# Patient Record
Sex: MALE | Race: WHITE | NOT HISPANIC OR LATINO | Employment: OTHER | URBAN - METROPOLITAN AREA
[De-identification: names, ages, dates, MRNs, and addresses within clinical notes are randomized per-mention and may not be internally consistent; named-entity substitution may affect disease eponyms.]

---

## 2020-06-20 ENCOUNTER — HOSPITAL ENCOUNTER (INPATIENT)
Facility: MEDICAL CENTER | Age: 65
LOS: 3 days | DRG: 445 | End: 2020-06-23
Attending: EMERGENCY MEDICINE | Admitting: INTERNAL MEDICINE
Payer: MEDICARE

## 2020-06-20 ENCOUNTER — APPOINTMENT (OUTPATIENT)
Dept: RADIOLOGY | Facility: MEDICAL CENTER | Age: 65
DRG: 445 | End: 2020-06-20
Attending: INTERNAL MEDICINE
Payer: MEDICARE

## 2020-06-20 ENCOUNTER — APPOINTMENT (OUTPATIENT)
Dept: RADIOLOGY | Facility: MEDICAL CENTER | Age: 65
DRG: 445 | End: 2020-06-20
Attending: EMERGENCY MEDICINE
Payer: MEDICARE

## 2020-06-20 PROBLEM — K86.89 PANCREATIC MASS: Status: ACTIVE | Noted: 2020-06-20

## 2020-06-20 PROBLEM — N13.8 ENLARGED PROSTATE WITH URINARY OBSTRUCTION: Status: ACTIVE | Noted: 2020-06-20

## 2020-06-20 PROBLEM — K83.1 OBSTRUCTIVE JAUNDICE DUE TO MALIGNANT NEOPLASM (HCC): Status: ACTIVE | Noted: 2020-06-20

## 2020-06-20 PROBLEM — C80.1 OBSTRUCTIVE JAUNDICE DUE TO MALIGNANT NEOPLASM (HCC): Status: ACTIVE | Noted: 2020-06-20

## 2020-06-20 PROBLEM — D64.9 NORMOCYTIC ANEMIA, NOT DUE TO BLOOD LOSS: Status: ACTIVE | Noted: 2020-06-20

## 2020-06-20 PROBLEM — N40.1 ENLARGED PROSTATE WITH URINARY OBSTRUCTION: Status: ACTIVE | Noted: 2020-06-20

## 2020-06-20 PROBLEM — I10 ESSENTIAL HYPERTENSION: Status: ACTIVE | Noted: 2020-06-20

## 2020-06-20 LAB
ALBUMIN SERPL BCP-MCNC: 3.4 G/DL (ref 3.2–4.9)
ALBUMIN/GLOB SERPL: 1.8 G/DL
ALP SERPL-CCNC: 334 U/L (ref 30–99)
ALT SERPL-CCNC: 90 U/L (ref 2–50)
AMMONIA PLAS-SCNC: 15 UMOL/L (ref 11–45)
ANION GAP SERPL CALC-SCNC: 10 MMOL/L (ref 7–16)
ANISOCYTOSIS BLD QL SMEAR: ABNORMAL
AST SERPL-CCNC: 78 U/L (ref 12–45)
BASOPHILS # BLD AUTO: 1 % (ref 0–1.8)
BASOPHILS # BLD: 0.05 K/UL (ref 0–0.12)
BILIRUB SERPL-MCNC: 23.9 MG/DL (ref 0.1–1.5)
BUN SERPL-MCNC: 16 MG/DL (ref 8–22)
CALCIUM SERPL-MCNC: 9 MG/DL (ref 8.4–10.2)
CHLORIDE SERPL-SCNC: 103 MMOL/L (ref 96–112)
CO2 SERPL-SCNC: 23 MMOL/L (ref 20–33)
COVID ORDER STATUS COVID19: NORMAL
CREAT SERPL-MCNC: 0.35 MG/DL (ref 0.5–1.4)
EOSINOPHIL # BLD AUTO: 0.26 K/UL (ref 0–0.51)
EOSINOPHIL NFR BLD: 5 % (ref 0–6.9)
ERYTHROCYTE [DISTWIDTH] IN BLOOD BY AUTOMATED COUNT: 64 FL (ref 35.9–50)
ETHANOL BLD-MCNC: <10.1 MG/DL (ref 0–10.1)
GLOBULIN SER CALC-MCNC: 1.9 G/DL (ref 1.9–3.5)
GLUCOSE SERPL-MCNC: 140 MG/DL (ref 65–99)
HCT VFR BLD AUTO: 28 % (ref 42–52)
HGB BLD-MCNC: 9.5 G/DL (ref 14–18)
INR PPP: 1.13 (ref 0.87–1.13)
LG PLATELETS BLD QL SMEAR: NORMAL
LIPASE SERPL-CCNC: 27 U/L (ref 7–58)
LYMPHOCYTES # BLD AUTO: 1.28 K/UL (ref 1–4.8)
LYMPHOCYTES NFR BLD: 25 % (ref 22–41)
MAGNESIUM SERPL-MCNC: 1.4 MG/DL (ref 1.5–2.5)
MANUAL DIFF BLD: NORMAL
MCH RBC QN AUTO: 31.5 PG (ref 27–33)
MCHC RBC AUTO-ENTMCNC: 33.8 G/DL (ref 33.7–35.3)
MCV RBC AUTO: 93.3 FL (ref 81.4–97.8)
MONOCYTES # BLD AUTO: 0.15 K/UL (ref 0–0.85)
MONOCYTES NFR BLD AUTO: 3 % (ref 0–13.4)
NEUTROPHILS # BLD AUTO: 3.37 K/UL (ref 1.82–7.42)
NEUTROPHILS NFR BLD: 66 % (ref 44–72)
NRBC # BLD AUTO: 0 K/UL
NRBC BLD-RTO: 0 /100 WBC
PHOSPHATE SERPL-MCNC: 2.3 MG/DL (ref 2.5–4.5)
PLATELET # BLD AUTO: 214 K/UL (ref 164–446)
PLATELET BLD QL SMEAR: NORMAL
PMV BLD AUTO: 11.6 FL (ref 9–12.9)
POIKILOCYTOSIS BLD QL SMEAR: NORMAL
POLYCHROMASIA BLD QL SMEAR: NORMAL
POTASSIUM SERPL-SCNC: 3.5 MMOL/L (ref 3.6–5.5)
PROT SERPL-MCNC: 5.3 G/DL (ref 6–8.2)
PROTHROMBIN TIME: 14.7 SEC (ref 12–14.6)
RBC # BLD AUTO: 2.98 M/UL (ref 4.7–6.1)
RBC BLD AUTO: PRESENT
SARS-COV-2 RNA RESP QL NAA+PROBE: NOTDETECTED
SODIUM SERPL-SCNC: 136 MMOL/L (ref 135–145)
SPECIMEN SOURCE: NORMAL
SPHEROCYTES BLD QL SMEAR: NORMAL
TARGETS BLD QL SMEAR: NORMAL
WBC # BLD AUTO: 5.1 K/UL (ref 4.8–10.8)

## 2020-06-20 PROCEDURE — 76705 ECHO EXAM OF ABDOMEN: CPT

## 2020-06-20 PROCEDURE — 83735 ASSAY OF MAGNESIUM: CPT

## 2020-06-20 PROCEDURE — 85610 PROTHROMBIN TIME: CPT

## 2020-06-20 PROCEDURE — 85027 COMPLETE CBC AUTOMATED: CPT

## 2020-06-20 PROCEDURE — 84100 ASSAY OF PHOSPHORUS: CPT

## 2020-06-20 PROCEDURE — 99285 EMERGENCY DEPT VISIT HI MDM: CPT

## 2020-06-20 PROCEDURE — 85007 BL SMEAR W/DIFF WBC COUNT: CPT

## 2020-06-20 PROCEDURE — 99223 1ST HOSP IP/OBS HIGH 75: CPT | Mod: AI | Performed by: INTERNAL MEDICINE

## 2020-06-20 PROCEDURE — U0004 COV-19 TEST NON-CDC HGH THRU: HCPCS

## 2020-06-20 PROCEDURE — 36415 COLL VENOUS BLD VENIPUNCTURE: CPT

## 2020-06-20 PROCEDURE — 82140 ASSAY OF AMMONIA: CPT

## 2020-06-20 PROCEDURE — 770006 HCHG ROOM/CARE - MED/SURG/GYN SEMI*

## 2020-06-20 PROCEDURE — 80053 COMPREHEN METABOLIC PANEL: CPT

## 2020-06-20 PROCEDURE — 80307 DRUG TEST PRSMV CHEM ANLYZR: CPT

## 2020-06-20 PROCEDURE — C9803 HOPD COVID-19 SPEC COLLECT: HCPCS | Performed by: INTERNAL MEDICINE

## 2020-06-20 PROCEDURE — 700105 HCHG RX REV CODE 258: Performed by: INTERNAL MEDICINE

## 2020-06-20 PROCEDURE — 83690 ASSAY OF LIPASE: CPT

## 2020-06-20 RX ORDER — POLYETHYLENE GLYCOL 3350 17 G/17G
1 POWDER, FOR SOLUTION ORAL
Status: DISCONTINUED | OUTPATIENT
Start: 2020-06-20 | End: 2020-06-23 | Stop reason: HOSPADM

## 2020-06-20 RX ORDER — ACETAMINOPHEN 325 MG/1
650 TABLET ORAL EVERY 6 HOURS PRN
Status: DISCONTINUED | OUTPATIENT
Start: 2020-06-20 | End: 2020-06-23 | Stop reason: HOSPADM

## 2020-06-20 RX ORDER — LISINOPRIL 10 MG/1
10 TABLET ORAL DAILY
COMMUNITY

## 2020-06-20 RX ORDER — AMOXICILLIN 250 MG
2 CAPSULE ORAL 2 TIMES DAILY
Status: DISCONTINUED | OUTPATIENT
Start: 2020-06-20 | End: 2020-06-23 | Stop reason: HOSPADM

## 2020-06-20 RX ORDER — ENALAPRILAT 1.25 MG/ML
1.25 INJECTION INTRAVENOUS EVERY 6 HOURS PRN
Status: DISCONTINUED | OUTPATIENT
Start: 2020-06-20 | End: 2020-06-23 | Stop reason: HOSPADM

## 2020-06-20 RX ORDER — TAMSULOSIN HYDROCHLORIDE 0.4 MG/1
0.4 CAPSULE ORAL
COMMUNITY

## 2020-06-20 RX ORDER — PANTOPRAZOLE SODIUM 40 MG/1
40 TABLET, DELAYED RELEASE ORAL DAILY
COMMUNITY

## 2020-06-20 RX ORDER — SODIUM CHLORIDE 9 MG/ML
INJECTION, SOLUTION INTRAVENOUS CONTINUOUS
Status: DISCONTINUED | OUTPATIENT
Start: 2020-06-20 | End: 2020-06-23

## 2020-06-20 RX ORDER — TAMSULOSIN HYDROCHLORIDE 0.4 MG/1
0.4 CAPSULE ORAL
Status: DISCONTINUED | OUTPATIENT
Start: 2020-06-21 | End: 2020-06-23 | Stop reason: HOSPADM

## 2020-06-20 RX ORDER — ONDANSETRON 2 MG/ML
4 INJECTION INTRAMUSCULAR; INTRAVENOUS EVERY 4 HOURS PRN
Status: DISCONTINUED | OUTPATIENT
Start: 2020-06-20 | End: 2020-06-23 | Stop reason: HOSPADM

## 2020-06-20 RX ORDER — ONDANSETRON 4 MG/1
4 TABLET, ORALLY DISINTEGRATING ORAL EVERY 4 HOURS PRN
Status: DISCONTINUED | OUTPATIENT
Start: 2020-06-20 | End: 2020-06-23 | Stop reason: HOSPADM

## 2020-06-20 RX ORDER — LISINOPRIL 5 MG/1
10 TABLET ORAL DAILY
Status: DISCONTINUED | OUTPATIENT
Start: 2020-06-21 | End: 2020-06-23 | Stop reason: HOSPADM

## 2020-06-20 RX ORDER — BISACODYL 10 MG
10 SUPPOSITORY, RECTAL RECTAL
Status: DISCONTINUED | OUTPATIENT
Start: 2020-06-20 | End: 2020-06-23 | Stop reason: HOSPADM

## 2020-06-20 RX ADMIN — SODIUM CHLORIDE: 9 INJECTION, SOLUTION INTRAVENOUS at 15:57

## 2020-06-20 ASSESSMENT — ENCOUNTER SYMPTOMS
SENSORY CHANGE: 0
COUGH: 0
DIZZINESS: 0
HEADACHES: 0
FEVER: 0
NERVOUS/ANXIOUS: 0
PALPITATIONS: 0
STRIDOR: 0
TREMORS: 0
ABDOMINAL PAIN: 0
CONSTIPATION: 0
SHORTNESS OF BREATH: 0
DEPRESSION: 0
BRUISES/BLEEDS EASILY: 0
HEARTBURN: 0
DIAPHORESIS: 0
CHILLS: 0
WEAKNESS: 0
VOMITING: 0
ORTHOPNEA: 0
MYALGIAS: 0
NAUSEA: 0
SORE THROAT: 0
INSOMNIA: 0
DIARRHEA: 0
SPUTUM PRODUCTION: 0
FALLS: 0

## 2020-06-20 ASSESSMENT — LIFESTYLE VARIABLES
CONSUMPTION TOTAL: NEGATIVE
EVER_SMOKED: YES
TOTAL SCORE: 0
ON A TYPICAL DAY WHEN YOU DRINK ALCOHOL HOW MANY DRINKS DO YOU HAVE: 1
TOTAL SCORE: 0
EVER FELT BAD OR GUILTY ABOUT YOUR DRINKING: NO
HAVE YOU EVER FELT YOU SHOULD CUT DOWN ON YOUR DRINKING: NO
TOTAL SCORE: 0
AVERAGE NUMBER OF DAYS PER WEEK YOU HAVE A DRINK CONTAINING ALCOHOL: 1
ALCOHOL_USE: YES
EVER HAD A DRINK FIRST THING IN THE MORNING TO STEADY YOUR NERVES TO GET RID OF A HANGOVER: NO
HAVE PEOPLE ANNOYED YOU BY CRITICIZING YOUR DRINKING: NO
HOW MANY TIMES IN THE PAST YEAR HAVE YOU HAD 5 OR MORE DRINKS IN A DAY: 0

## 2020-06-20 ASSESSMENT — PATIENT HEALTH QUESTIONNAIRE - PHQ9
2. FEELING DOWN, DEPRESSED, IRRITABLE, OR HOPELESS: NOT AT ALL
1. LITTLE INTEREST OR PLEASURE IN DOING THINGS: NOT AT ALL
SUM OF ALL RESPONSES TO PHQ9 QUESTIONS 1 AND 2: 0

## 2020-06-20 NOTE — ED PROVIDER NOTES
ED Provider Note    CHIEF COMPLAINT  Chief Complaint   Patient presents with   • Jaundice     for past 2-3 weeks. pt states he has been lethargic. pt very yellow. urine is very dark almost brown. denies nausea/vomiting       HPI  Janes Cody is a 64 y.o. male who presents to the emergency department complaint of turning yellow.  He states that he has been turning yellow for the past 3 weeks does not know why.  He does occasionally drink alcohol but states he does not excessively drink alcohol, he has had no abdominal pain, he has felt slightly tired and weak but has not been confused, denies nausea, vomiting, hematochezia, melena.  He states his urine is a very funny Pepsi colored fluid.  Follow-up with his primary care physician several months before he started turning yellow and all his tests were negative.  His family history of carcinoma cancer, denies recent trauma, denies excessive Tylenol use, denies new medications.  REVIEW OF SYSTEMS  Positives as above. Pertinent negatives include fever, shakes, chills, sweats, dental pain, bloating, distention of the abdomen, [place], melena, hematemesis  All other review of systems are negative    PAST MEDICAL HISTORY  Past Medical History:   Diagnosis Date   • Hypertension    • Seizure disorder (HCC)     had a couple seizures once but not on meds and never dx with anything       FAMILY HISTORY  Noncontributory    SOCIAL HISTORY  Social History     Socioeconomic History   • Marital status: Not on file     Spouse name: Not on file   • Number of children: Not on file   • Years of education: Not on file   • Highest education level: Not on file   Occupational History   • Not on file   Social Needs   • Financial resource strain: Not on file   • Food insecurity     Worry: Not on file     Inability: Not on file   • Transportation needs     Medical: Not on file     Non-medical: Not on file   Tobacco Use   • Smoking status: Current Every Day Smoker     Packs/day: 0.25  "    Types: Cigarettes   • Smokeless tobacco: Never Used   Substance and Sexual Activity   • Alcohol use: Yes     Comment: occassional   • Drug use: Yes     Comment: marijuana   • Sexual activity: Not on file   Lifestyle   • Physical activity     Days per week: Not on file     Minutes per session: Not on file   • Stress: Not on file   Relationships   • Social connections     Talks on phone: Not on file     Gets together: Not on file     Attends Zoroastrian service: Not on file     Active member of club or organization: Not on file     Attends meetings of clubs or organizations: Not on file     Relationship status: Not on file   • Intimate partner violence     Fear of current or ex partner: Not on file     Emotionally abused: Not on file     Physically abused: Not on file     Forced sexual activity: Not on file   Other Topics Concern   • Not on file   Social History Narrative   • Not on file       SURGICAL HISTORY  History reviewed. No pertinent surgical history.    CURRENT MEDICATIONS  Home Medications     Reviewed by Arnaldo Aguiar (Pharmacy Tech) on 06/20/20 at 1135  Med List Status: Complete   Medication Last Dose Status   lisinopril (PRINIVIL) 10 MG Tab 6/20/2020 Active   pantoprazole (PROTONIX) 40 MG Tablet Delayed Response 6/20/2020 Active   tamsulosin (FLOMAX) 0.4 MG capsule 6/19/2020 Active                ALLERGIES  Allergies   Allergen Reactions   • Lactose        PHYSICAL EXAM  VITAL SIGNS: /77   Pulse 64   Temp 36.7 °C (98.1 °F) (Temporal)   Resp 14   Ht 1.88 m (6' 2\")   Wt 70.1 kg (154 lb 8.7 oz)   SpO2 100%   BMI 19.84 kg/m²      Constitutional: Well developed, Well nourished, No acute distress, Non-toxic appearance.   Eyes: PERRLA, EOMI, lateral icterus, No discharge.   Cardiovascular: Normal heart rate, Normal rhythm, No murmurs, No rubs, No gallops, and intact distal pulses.   Thorax & Lungs:  No respiratory distress, no rales, no rhonchi, No wheezing, No chest wall tenderness. "   Abdomen: Bowel sounds normal, Soft, No tenderness, No guarding, No rebound, No pulsatile masses.   Skin: Warm, Dry, No erythema, jaundiced throughout  Extremities: Full range of motion, no deformity, no edema.  Neurologic: Alert & oriented x 3, No focal deficits noted, acting appropriately on exam, no asterixis.  Psychiatric: Affect normal for clinical presentation.    Results for orders placed or performed during the hospital encounter of 06/20/20   COMP METABOLIC PANEL   Result Value Ref Range    Sodium 136 135 - 145 mmol/L    Potassium 3.5 (L) 3.6 - 5.5 mmol/L    Chloride 103 96 - 112 mmol/L    Co2 23 20 - 33 mmol/L    Anion Gap 10.0 7.0 - 16.0    Glucose 140 (H) 65 - 99 mg/dL    Bun 16 8 - 22 mg/dL    Creatinine 0.35 (L) 0.50 - 1.40 mg/dL    Calcium 9.0 8.4 - 10.2 mg/dL    AST(SGOT) 78 (H) 12 - 45 U/L    ALT(SGPT) 90 (H) 2 - 50 U/L    Alkaline Phosphatase 334 (H) 30 - 99 U/L    Total Bilirubin 23.9 (H) 0.1 - 1.5 mg/dL    Albumin 3.4 3.2 - 4.9 g/dL    Total Protein 5.3 (L) 6.0 - 8.2 g/dL    Globulin 1.9 1.9 - 3.5 g/dL    A-G Ratio 1.8 g/dL   LIPASE   Result Value Ref Range    Lipase 27 7 - 58 U/L   MAGNESIUM   Result Value Ref Range    Magnesium 1.4 (L) 1.5 - 2.5 mg/dL   PHOSPHORUS   Result Value Ref Range    Phosphorus 2.3 (L) 2.5 - 4.5 mg/dL   AMMONIA   Result Value Ref Range    Ammonia 15 11 - 45 umol/L   PT/INR   Result Value Ref Range    PT 14.7 (H) 12.0 - 14.6 sec    INR 1.13 0.87 - 1.13   ETHYL ALCOHOL (BLOOD)   Result Value Ref Range    Diagnostic Alcohol <10.1 0.0 - 10.1 mg/dL   ESTIMATED GFR   Result Value Ref Range    GFR If African American >60 >60 mL/min/1.73 m 2    GFR If Non African American >60 >60 mL/min/1.73 m 2       RADIOLOGY/PROCEDURES    US-RUQ   Final Result      1.  2.3 cm pancreatic head mass highly suspicious for malignancy      2.  Intrahepatic biliary dilation, extrahepatic there are dilation, and gallbladder distention filled with sludge consistent with biliary obstruction likely  from presumed pancreatic malignancy      3.  Hepatomegaly          COURSE & MEDICAL DECISION MAKING  Pertinent Labs & Imaging studies reviewed. (See chart for details)  This is a pleasant 64-year-old gentleman presents with jaundice with a total bilirubin of 23, ammonia 15, lipase is 27.  Ultrasound was completed revealed evidence of pancreatic mass as well as hepatic ductal dilatation and sludge in the gallbladder suspicious of obstruction of the biliary ductal system.  The patient was discussed with Dr. Moser for hospitalization and GI was consulted, Dr. Wade for consultation.  Do believe the patient will need MRCP followed by GI intervention.  The patient does not have an acute surgical emergency currently he had be hospitalized for further evaluation and termination of the mass as well as biliary obstruction.    FINAL IMPRESSION  Pancreatic mass  Hyperbilirubinemia  Biliary ductal obstruction         Electronically signed by: Mehdi Sheridan D.O., 6/20/2020 11:59 AM

## 2020-06-20 NOTE — ED NOTES
Med Rec completed per patient and RX bottles (returned)   Allergies reviewed  No ORAL antibiotics in last 14 days

## 2020-06-20 NOTE — ASSESSMENT & PLAN NOTE
Likely malignancy, with biliary obstruction and elevated bilirubin level  GI is following [facility], will need follow up with oncology and DR. Flores after pathology is back

## 2020-06-20 NOTE — ASSESSMENT & PLAN NOTE
ERCP done with stent placed, follow lab trend  Will need evaluation with surgery Dr. Flores in the near future when pathology is back from biopsy  Elevated ca19-9

## 2020-06-20 NOTE — H&P
Hospital Medicine History & Physical Note    Date of Service  6/20/2020    Primary Care Physician  [place] [place] [facility]    Code Status  Full Code    Chief Complaint  Chief Complaint   Patient presents with   • Jaundice     for past 2-3 weeks. pt states he has been lethargic. pt very yellow. urine is very dark almost brown. denies nausea/vomiting       History of Presenting Illness  64 y.o. male who presented 6/20/2020 with yellowing skin for 3 weeks. He did see a physician at the [facility] where he lives and has a an appointment with a GI doctor in [place] for June 27. His yellowing was getting worse so he came to the hospital here for evaluation as he felt waiting until June 27 was too long. He has light loose stool, dark urine and yellowing if his skin and eyes getting worse. He denies any nausea, vomiting, abdominal pain, headache, swelling, shortness of breath, cough, or vision changes.     Review of Systems  Review of Systems   Constitutional: Negative for chills, diaphoresis, fever and malaise/fatigue.   HENT: Negative for congestion, nosebleeds and sore throat.    Respiratory: Negative for cough, sputum production, shortness of breath and stridor.    Cardiovascular: Negative for chest pain, palpitations, orthopnea and leg swelling.   Gastrointestinal: Negative for abdominal pain, constipation, diarrhea, heartburn, nausea and vomiting.        Light colored stool   Genitourinary: Negative for dysuria, frequency and urgency.        Dark urine   Musculoskeletal: Negative for falls, joint pain and myalgias.   Skin: Positive for itching. Negative for rash.   Neurological: Negative for dizziness, tremors, sensory change, weakness and headaches.   Endo/Heme/Allergies: Does not bruise/bleed easily.   Psychiatric/Behavioral: Negative for depression. The patient is not nervous/anxious and does not have insomnia.    All other systems reviewed and are negative.      Past Medical History    has a past medical history of Hypertension and Seizure disorder (HCC). enlarged prostate, one seizure in 2018 with no recurrence    Surgical History  Right elbow surgery, appendectomy    Family History  No cancer    Social History   reports that he has been smoking cigarettes. He has been smoking about 0.25 packs per day. He has never used smokeless tobacco. He reports current alcohol use. He reports current drug use.    Allergies  Allergies   Allergen Reactions   • Lactose        Medications  Prior to Admission Medications   Prescriptions Last Dose Informant Patient Reported? Taking?   lisinopril (PRINIVIL) 10 MG Tab 6/20/2020 at AM Rx Bottle (For Med Information) Yes Yes   Sig: Take 10 mg by mouth every day. Indications: High Blood Pressure Disorder   pantoprazole (PROTONIX) 40 MG Tablet Delayed Response 6/20/2020 at AM Rx Bottle (For Med Information) Yes Yes   Sig: Take 40 mg by mouth every day.   tamsulosin (FLOMAX) 0.4 MG capsule 6/19/2020 at AFTERNOON Rx Bottle (For Med Information) Yes Yes   Sig: Take 0.4 mg by mouth ONE-HALF HOUR AFTER BREAKFAST.      Facility-Administered Medications: None       Physical Exam  Temp:  [36.7 °C (98.1 °F)] 36.7 °C (98.1 °F)  Pulse:  [64-71] 64  Resp:  [14] 14  BP: (134-152)/(77-82) 152/77  SpO2:  [100 %] 100 %    Physical Exam  Vitals signs and nursing note reviewed.   Constitutional:       Appearance: He is normal weight. He is not ill-appearing or diaphoretic.   HENT:      Nose: Nose normal. No congestion.      Mouth/Throat:      Mouth: Mucous membranes are moist.      Pharynx: Oropharynx is clear. No oropharyngeal exudate.   Eyes:      General: Scleral icterus present.      Conjunctiva/sclera: Conjunctivae normal.      Pupils: Pupils are equal, round, and reactive to light.   Neck:      Musculoskeletal: Neck supple. No muscular tenderness.   Cardiovascular:      Rate and Rhythm: Normal rate and regular rhythm.      Pulses: Normal pulses.      Heart sounds: Normal heart  sounds. No murmur. No friction rub.   Pulmonary:      Effort: Pulmonary effort is normal. No respiratory distress.      Breath sounds: Normal breath sounds. No stridor.   Abdominal:      General: Bowel sounds are normal. There is no distension.      Palpations: There is no mass.      Tenderness: There is no abdominal tenderness. There is no guarding or rebound.   Musculoskeletal:         General: No swelling or tenderness.   Skin:     General: Skin is warm and dry.      Capillary Refill: Capillary refill takes less than 2 seconds.      Coloration: Skin is jaundiced. Skin is not pale.      Findings: No bruising, erythema or rash.   Neurological:      Mental Status: He is alert and oriented to person, place, and time. Mental status is at baseline.      Coordination: Coordination normal.   Psychiatric:         Mood and Affect: Mood normal.         Behavior: Behavior normal.         Thought Content: Thought content normal.         Laboratory:  Recent Labs     06/20/20  1123   WBC 5.1   RBC 2.98*   HEMOGLOBIN 9.5*   HEMATOCRIT 28.0*   MCV 93.3   MCH 31.5   MCHC 33.8   RDW 64.0*   PLATELETCT 214   MPV 11.6     Recent Labs     06/20/20  1123   SODIUM 136   POTASSIUM 3.5*   CHLORIDE 103   CO2 23   GLUCOSE 140*   BUN 16   CREATININE 0.35*   CALCIUM 9.0     Recent Labs     06/20/20  1123   ALTSGPT 90*   ASTSGOT 78*   ALKPHOSPHAT 334*   TBILIRUBIN 23.9*   LIPASE 27   GLUCOSE 140*     Recent Labs     06/20/20  1123   INR 1.13     No results for input(s): NTPROBNP in the last 72 hours.      No results for input(s): TROPONINT in the last 72 hours.    Imaging:  US-RUQ   Final Result      1.  2.3 cm pancreatic head mass highly suspicious for malignancy      2.  Intrahepatic biliary dilation, extrahepatic there are dilation, and gallbladder distention filled with sludge consistent with biliary obstruction likely from presumed pancreatic malignancy      3.  Hepatomegaly            Assessment/Plan:  I anticipate the patient will  require greater than 2 midnight stay for treatment and work up of his painless jaundice with pancreatic head mass.    Enlarged prostate with urinary obstruction  Assessment & Plan  Continue home flomax    Essential hypertension  Assessment & Plan  Continue lisinopril as tolerated by blood pressure    Obstructive jaundice due to malignant neoplasm (HCC)  Assessment & Plan  Will need biliary stent placement and evaluation with surgery Dr. Flores in the near future    Pancreatic mass  Assessment & Plan  Concerning for malignancy, with biliary obstruction and elevated bilirubin level  GI is consulted [facility], by the ED for possible ERCP and consideration for stent placement and biopsies    Normocytic anemia, not due to blood loss  Assessment & Plan  Monitor labs, likely from chronic inflammation, will check iron studies

## 2020-06-21 ENCOUNTER — APPOINTMENT (OUTPATIENT)
Dept: RADIOLOGY | Facility: MEDICAL CENTER | Age: 65
DRG: 445 | End: 2020-06-21
Attending: INTERNAL MEDICINE
Payer: MEDICARE

## 2020-06-21 LAB
ALBUMIN SERPL BCP-MCNC: 3.1 G/DL (ref 3.2–4.9)
ALBUMIN/GLOB SERPL: 1.8 G/DL
ALP SERPL-CCNC: 329 U/L (ref 30–99)
ALT SERPL-CCNC: 83 U/L (ref 2–50)
ANION GAP SERPL CALC-SCNC: 12 MMOL/L (ref 7–16)
AST SERPL-CCNC: 73 U/L (ref 12–45)
BASOPHILS # BLD AUTO: 0.8 % (ref 0–1.8)
BASOPHILS # BLD: 0.04 K/UL (ref 0–0.12)
BILIRUB SERPL-MCNC: 21.3 MG/DL (ref 0.1–1.5)
BUN SERPL-MCNC: 12 MG/DL (ref 8–22)
CALCIUM SERPL-MCNC: 8.7 MG/DL (ref 8.4–10.2)
CANCER AG19-9 SERPL-ACNC: 417 U/ML (ref 0–35)
CEA SERPL-MCNC: 7.1 NG/ML (ref 0–3)
CHLORIDE SERPL-SCNC: 108 MMOL/L (ref 96–112)
CO2 SERPL-SCNC: 20 MMOL/L (ref 20–33)
CREAT SERPL-MCNC: 0.41 MG/DL (ref 0.5–1.4)
EOSINOPHIL # BLD AUTO: 0.24 K/UL (ref 0–0.51)
EOSINOPHIL NFR BLD: 4.8 % (ref 0–6.9)
ERYTHROCYTE [DISTWIDTH] IN BLOOD BY AUTOMATED COUNT: 63.5 FL (ref 35.9–50)
GLOBULIN SER CALC-MCNC: 1.7 G/DL (ref 1.9–3.5)
GLUCOSE SERPL-MCNC: 105 MG/DL (ref 65–99)
HCT VFR BLD AUTO: 27.2 % (ref 42–52)
HGB BLD-MCNC: 9.4 G/DL (ref 14–18)
IMM GRANULOCYTES # BLD AUTO: 0.01 K/UL (ref 0–0.11)
IMM GRANULOCYTES NFR BLD AUTO: 0.2 % (ref 0–0.9)
IRON SATN MFR SERPL: 74 % (ref 15–55)
IRON SERPL-MCNC: 132 UG/DL (ref 50–180)
LYMPHOCYTES # BLD AUTO: 1.25 K/UL (ref 1–4.8)
LYMPHOCYTES NFR BLD: 25.1 % (ref 22–41)
MCH RBC QN AUTO: 31.4 PG (ref 27–33)
MCHC RBC AUTO-ENTMCNC: 34.6 G/DL (ref 33.7–35.3)
MCV RBC AUTO: 91 FL (ref 81.4–97.8)
MONOCYTES # BLD AUTO: 0.5 K/UL (ref 0–0.85)
MONOCYTES NFR BLD AUTO: 10 % (ref 0–13.4)
NEUTROPHILS # BLD AUTO: 2.95 K/UL (ref 1.82–7.42)
NEUTROPHILS NFR BLD: 59.1 % (ref 44–72)
NRBC # BLD AUTO: 0 K/UL
NRBC BLD-RTO: 0 /100 WBC
PLATELET # BLD AUTO: 207 K/UL (ref 164–446)
PMV BLD AUTO: 12 FL (ref 9–12.9)
POTASSIUM SERPL-SCNC: 3.6 MMOL/L (ref 3.6–5.5)
PROT SERPL-MCNC: 4.8 G/DL (ref 6–8.2)
RBC # BLD AUTO: 2.99 M/UL (ref 4.7–6.1)
SODIUM SERPL-SCNC: 140 MMOL/L (ref 135–145)
TIBC SERPL-MCNC: 178 UG/DL (ref 250–450)
UIBC SERPL-MCNC: 46 UG/DL (ref 110–370)
WBC # BLD AUTO: 5 K/UL (ref 4.8–10.8)

## 2020-06-21 PROCEDURE — 74178 CT ABD&PLV WO CNTR FLWD CNTR: CPT

## 2020-06-21 PROCEDURE — 36415 COLL VENOUS BLD VENIPUNCTURE: CPT

## 2020-06-21 PROCEDURE — 83540 ASSAY OF IRON: CPT

## 2020-06-21 PROCEDURE — 700105 HCHG RX REV CODE 258: Performed by: INTERNAL MEDICINE

## 2020-06-21 PROCEDURE — 700117 HCHG RX CONTRAST REV CODE 255: Performed by: HOSPITALIST

## 2020-06-21 PROCEDURE — 86301 IMMUNOASSAY TUMOR CA 19-9: CPT

## 2020-06-21 PROCEDURE — 80053 COMPREHEN METABOLIC PANEL: CPT

## 2020-06-21 PROCEDURE — 700102 HCHG RX REV CODE 250 W/ 637 OVERRIDE(OP): Performed by: INTERNAL MEDICINE

## 2020-06-21 PROCEDURE — 770006 HCHG ROOM/CARE - MED/SURG/GYN SEMI*

## 2020-06-21 PROCEDURE — A9270 NON-COVERED ITEM OR SERVICE: HCPCS | Performed by: INTERNAL MEDICINE

## 2020-06-21 PROCEDURE — 82378 CARCINOEMBRYONIC ANTIGEN: CPT

## 2020-06-21 PROCEDURE — 99233 SBSQ HOSP IP/OBS HIGH 50: CPT | Performed by: HOSPITALIST

## 2020-06-21 PROCEDURE — 85025 COMPLETE CBC W/AUTO DIFF WBC: CPT

## 2020-06-21 PROCEDURE — 83550 IRON BINDING TEST: CPT

## 2020-06-21 RX ADMIN — IOHEXOL 100 ML: 350 INJECTION, SOLUTION INTRAVENOUS at 08:56

## 2020-06-21 RX ADMIN — TAMSULOSIN HYDROCHLORIDE 0.4 MG: 0.4 CAPSULE ORAL at 10:11

## 2020-06-21 RX ADMIN — SODIUM CHLORIDE: 9 INJECTION, SOLUTION INTRAVENOUS at 05:17

## 2020-06-21 ASSESSMENT — COGNITIVE AND FUNCTIONAL STATUS - GENERAL
SUGGESTED CMS G CODE MODIFIER DAILY ACTIVITY: CH
MOBILITY SCORE: 24
DAILY ACTIVITIY SCORE: 24
SUGGESTED CMS G CODE MODIFIER MOBILITY: CH

## 2020-06-21 ASSESSMENT — ENCOUNTER SYMPTOMS
DEPRESSION: 0
BACK PAIN: 0
EYE PAIN: 0
FEVER: 0
INSOMNIA: 0
SHORTNESS OF BREATH: 0
COUGH: 0
PALPITATIONS: 0
NECK PAIN: 0
HEADACHES: 0
CHILLS: 0
NAUSEA: 0
BLURRED VISION: 0
SORE THROAT: 0
ABDOMINAL PAIN: 0
VOMITING: 0
DIZZINESS: 0
WEIGHT LOSS: 1
TINGLING: 0

## 2020-06-21 NOTE — PROGRESS NOTES
Received report from [place] at 0705 and assumed care. Pt. Is resting in bed. Bed in lowest and locked position. Pt's belongings are within reach. Pt's call light is within reach.

## 2020-06-21 NOTE — PROGRESS NOTES
Received patient from ED. AOx4, reported no pain. Reports having generalized itchiness. Verbalizes understanding of admission orders and POC. Jaundiced. Planned ERCP for tomorrow.

## 2020-06-21 NOTE — CONSULTS
GI Consult    Pancreatic mass    Obstructive jaundice    Rec: Pancreatic protocol CT           ERCP       Full consult to follow

## 2020-06-21 NOTE — PROGRESS NOTES
[facility] Medicine Daily Progress Note    Date of Service  6/21/2020    Chief Complaint  64 y.o. male admitted 6/20/2020 with jaundice.     Hospital Course    He was found to have a pancreatic mass. Gi was consulted and he was provided with supportive care.       Interval Problem Update  I discussed him with radiology today regarding his CT. He is frustrated about not eating and not being on the ERCP schedule. I had the RN contact GI for an update on when his ERCP is planned. He has no pain.     Consultants/Specialty  GI    Code Status  full    Disposition  tbd    Review of Systems  Review of Systems   Constitutional: Positive for malaise/fatigue and weight loss. Negative for chills and fever.   HENT: Negative for sore throat.    Eyes: Negative for blurred vision and pain.   Respiratory: Negative for cough and shortness of breath.    Cardiovascular: Negative for chest pain and palpitations.   Gastrointestinal: Negative for abdominal pain, nausea and vomiting.   Genitourinary: Negative for dysuria and urgency.   Musculoskeletal: Negative for back pain and neck pain.   Skin: Negative for itching and rash.   Neurological: Negative for dizziness, tingling and headaches.   Psychiatric/Behavioral: Negative for depression. The patient does not have insomnia.    All other systems reviewed and are negative.       Physical Exam  Temp:  [36.6 °C (97.9 °F)-36.8 °C (98.2 °F)] 36.6 °C (97.9 °F)  Pulse:  [58-71] 60  Resp:  [14-20] 18  BP: (121-152)/(63-82) 121/63  SpO2:  [97 %-100 %] 98 %    Physical Exam  Vitals signs and nursing note reviewed.   Constitutional:       General: He is not in acute distress.     Appearance: He is well-developed. He is not diaphoretic.      Comments: Patient seen and examined at the bedside. Plan discussed at bedside with the RN.   Thin appearing.    HENT:      Right Ear: External ear normal.      Left Ear: External ear normal.      Nose: Nose normal.   Eyes:      General: Scleral icterus present.          Right eye: No discharge.         Left eye: No discharge.   Neck:      Vascular: No JVD.      Trachea: No tracheal deviation.   Cardiovascular:      Rate and Rhythm: Normal rate.      Heart sounds: Normal heart sounds. No murmur.   Pulmonary:      Effort: Pulmonary effort is normal. No respiratory distress.      Breath sounds: Normal breath sounds. No wheezing or rales.   Abdominal:      General: Bowel sounds are normal. There is no distension.      Palpations: Abdomen is soft.      Tenderness: There is no abdominal tenderness. There is no guarding.   Musculoskeletal:         General: No tenderness.   Skin:     General: Skin is warm and dry.      Coloration: Skin is jaundiced.      Findings: No erythema.   Neurological:      Mental Status: He is alert and oriented to person, place, and time.   Psychiatric:         Behavior: Behavior normal.         Fluids    Intake/Output Summary (Last 24 hours) at 6/21/2020 0801  Last data filed at 6/21/2020 0517  Gross per 24 hour   Intake 878.75 ml   Output 950 ml   Net -71.25 ml       Laboratory  Recent Labs     06/20/20  1123 06/21/20  0341   WBC 5.1 5.0   RBC 2.98* 2.99*   HEMOGLOBIN 9.5* 9.4*   HEMATOCRIT 28.0* 27.2*   MCV 93.3 91.0   MCH 31.5 31.4   MCHC 33.8 34.6   RDW 64.0* 63.5*   PLATELETCT 214 207   MPV 11.6 12.0     Recent Labs     06/20/20  1123 06/21/20  0341   SODIUM 136 140   POTASSIUM 3.5* 3.6   CHLORIDE 103 108   CO2 23 20   GLUCOSE 140* 105*   BUN 16 12   CREATININE 0.35* 0.41*   CALCIUM 9.0 8.7     Recent Labs     06/20/20  1123   INR 1.13               Imaging  US-RUQ   Final Result      1.  2.3 cm pancreatic head mass highly suspicious for malignancy      2.  Intrahepatic biliary dilation, extrahepatic there are dilation, and gallbladder distention filled with sludge consistent with biliary obstruction likely from presumed pancreatic malignancy      3.  Hepatomegaly      CT-ABDOMEN-PELVIS WITH & W/O    (Results Pending)        Assessment/Plan  *  Obstructive jaundice due to malignant neoplasm (HCC)  Assessment & Plan  Will need biliary stent placement and evaluation with surgery Dr. Flores in the near future. ERCP pending with biopsy with GI. Pending ca19-9    Enlarged prostate with urinary obstruction  Assessment & Plan  Continue home flomax    Essential hypertension  Assessment & Plan  Continue lisinopril as tolerated by blood pressure    Pancreatic mass  Assessment & Plan  Concerning for malignancy, with biliary obstruction and elevated bilirubin level  GI is consulted [facility], by the ED for possible ERCP and consideration for stent placement and biopsies    Normocytic anemia, not due to blood loss  Assessment & Plan  Monitor labs, likely from chronic inflammation, pending iron studies       VTE prophylaxis: scd

## 2020-06-21 NOTE — CONSULTS
DATE OF SERVICE:  06/21/2020    GASTROENTEROLOGY CONSULTATION    REQUESTING PHYSICIAN:  Gardenia Moser MD    REASON FOR CONSULT:  Jaundice.    HISTORY OF PRESENT ILLNESS:  The patient is a 64-year-old male presents today   with a complaint of jaundice.    The patient describes an approximately 3- to 4-week history of progressive   jaundice.  He initially noticed some yellowing of his eyes, which has become   worse over time.  He was seen by primary care physician in [place], [place] for this issue and referred to a gastroenterologist.  His   appointment was not until next month.  He decided to come to [place] to seek   further evaluation and care given his daughter lives here.    The patient denies any history of jaundice, liver disease or pancreatic   disease in the past.  He has, otherwise, been feeling fairly well.  He does   admit to unintentional weight loss over the same period of time up to 10   pounds.    His appetite is normal.  He denies any nausea, vomiting or abdominal pain.    He does admit to dark-colored urine and light-colored stools.    REVIEW OF SYSTEMS:  PSYCHIATRIC:  Denies anxiety, depression.  MUSCULOSKELETAL:  No joint pain, swelling, stiffness.  DERMATOLOGIC:  Positive for pruritus.  GASTROINTESTINAL:  As above.  CARDIOVASCULAR:  No chest pain, palpitation, shortness of breath.  PULMONARY:  No cough or wheezing.  ALLERGY AND IMMUNOLOGY:  No hay fever.  ENDOCRINE:  Denies polyuria, polydipsia.  CONSTITUTIONAL:  Positive for unintentional weight loss.  GENITOURINARY:  Positive for urinary hesitancy.    PAST MEDICAL HISTORY:  Hypertension, BPH, history of seizure.    PAST SURGICAL HISTORY:  Appendectomy, elbow surgery.    OUTPATIENT MEDICATIONS:  Lisinopril, Protonix, Flomax.    ALLERGIES:  No known drug allergies.    SOCIAL HISTORY:  He has a significant other.  He has 2 grown children.  He   does smoke cigarettes.  He occasionally drinks alcohol, but not to excess.    Denies illicit  drug use.    FAMILY HISTORY:  No family history of gastrointestinal malignancies.    PHYSICAL EXAMINATION:  VITAL SIGNS:  Temperature is 36.7, pulse 64, respirations 18, /75, O2   sat 97% on room air.  GENERAL APPEARANCE:  Pleasant, thin male in no acute distress.  He is alert   and oriented x3.  HEENT:  Sclerae are icteric.  Oropharynx is moist.  NECK:  Supple, without adenopathy.  HEART:  Regular rate and rhythm.  LUNGS:  Clear to auscultation.  ABDOMEN:  Flat, soft with positive bowel sounds.  It is nontender.  There is   no guarding, rebound or rigidity.  EXTREMITIES:  Warm and dry without clubbing, cyanosis or edema.  SKIN:  Shows no rashes.    LABORATORY DATA:  White count 5, hematocrit 27, platelet count 207,000.    Sodium 140, potassium 3.6, chloride 108, bicarbonate 20, BUN 12, creatinine   0.4, AST 73, ALT 83, alkaline phosphatase 329, bilirubin 21, albumin 3.1.    Iron sat is 74%.  INR is 1.1.  CEA is 7.1.  CA 19-9 417.  COVID swab is   negative.    IMAGING:  CT scan of the abdomen and pelvis shows a 2.5 cm pancreatic head   mass, dilated biliary tract, left renal cysts, dilated portal vein.    ASSESSMENT AND PLAN:  A 64-year-old male with obstructive jaundice and   evidence of pancreatic mass concerning for malignancy.  1.  Pancreatic mass.  Radiographic findings and elevated tumor markers highly   suggestive of primary pancreatic neoplasm.  Plan, endoscopic ultrasound with   fine-needle aspiration of pancreatic mass tomorrow.  Informed consent was   obtained after explanation of risks, benefits and alternatives.  Next,   surgical consultation if he does not show any vascular involvement of his   mass.  2.  Obstructive jaundice secondary to problem #1.  Plan, endoscopic retrograde   cholangiopancreatography with biliary stent will be performed at the time of   the endoscopic ultrasound tomorrow.  3.  Malnutrition.  4.  Hypertension.  5.  Anemia, normocytic, likely secondary to chronic disease.  No  evidence of   gastrointestinal bleeding.  6.  Benign prostatic hypertrophy.       ____________________________________     DO CHIP HSU / MARÍA    DD:  06/21/2020 15:47:31  DT:  06/21/2020 16:41:25    D#:  [id number]  Job#:  [id number]

## 2020-06-21 NOTE — DISCHARGE PLANNING
Patient financial assisted updated insurance:       Admit Status: Inpatient  Admit Date:  06/20/20  Insurance Coverage: Medicare A&B / [employer] FFS  If Medicaid coverage, DOD? unknown  Authorization #: NNR  Number of Days Authorized: BOWEN   IP/OBS Notification by phone, fax or online?  NA  If by phone, reference # for call or representatives name: NA  Is insurance listed on Contracted Providers list?: No-secondary payor   Patient's estimated financial responsibility? 0

## 2020-06-21 NOTE — PROGRESS NOTES
Bedside report received from day RN. Pt is AAO x 4..  Pt reports no pain.POC discussed. Pt to be NPO after midnight. All needs met at this time. Bed in low position.Call light within reach.Rounding in place.

## 2020-06-21 NOTE — DISCHARGE PLANNING
Patient is a 64 year old single man who lives in [place], [place]. Facesheet report unemployed, no insurance.     SW emailed Patient Financial Assistance for Medicaid screening.     No PCP listed, no recent encounters, however patient reports he has gone to PCP. No issues with ADLs or IADLs. No home O2 noted, no hospital O2. Pharmacy is listed as [facility] on [address]. Patient reports regular alcohol use, denies issues with drugs and/or alcohol. No MH issues noted.     Patient is here for jaundice, diagnosed with pancreatic mass, GI consulted. Patient is not medically clear.     PLAN:   Screen patient for Medicaid: Patient financial assistance is on it.   Ensure that patient has any prescriptions needed due to no insurance  Ensure that patient has follow up with providers.     Care Transition Team Assessment    Information Source  Information Given By: Patient  Who is responsible for making decisions for patient? : Patient    Readmission Evaluation  Is this a readmission?: No    Elopement Risk  Legal Hold: No  Ambulatory or Self Mobile in Wheelchair: No-Not an Elopement Risk  Elopement Risk: Not at Risk for Elopement    Interdisciplinary Discharge Planning  Patient or legal guardian wants to designate a caregiver (see row info): No    Discharge Preparedness  What is your plan after discharge?: Home with help  What are your discharge supports?: Child  Prior Functional Level: Ambulatory, Independent with Activities of Daily Living, Independent with Medication Management  Difficulity with ADLs: None  Difficulity with IADLs: None    Functional Assesment  Prior Functional Level: Ambulatory, Independent with Activities of Daily Living, Independent with Medication Management    Finances  Financial Barriers to Discharge: Yes  Prescription Coverage: No  Prescription Coverage Comments: No insurance    Vision / Hearing Impairment  Vision Impairment : Yes  Right Eye Vision: Wears Glasses  Left Eye Vision: Wears Glasses  Hearing  Impairment : No         Advance Directive  Advance Directive?: None    Domestic Abuse  Have you ever been the victim of abuse or violence?: No  Physical Abuse or Sexual Abuse: No  Verbal Abuse or Emotional Abuse: No  Possible Abuse Reported to:: Not Applicable    Psychological Assessment  History of Substance Abuse: Other (comment)  Date Last Used - Alcohol: alcohol use  History of Psychiatric Problems: No  Non-compliant with Treatment: No  Newly Diagnosed Illness: Yes    Discharge Risks or Barriers  Discharge risks or barriers?: Uninsured / underinsured  Patient risk factors: Uninsured or underinsured    Anticipated Discharge Information  Anticipated discharge disposition: Home  Discharge Address: [address]  Discharge Contact Phone Number: [phone]

## 2020-06-21 NOTE — CARE PLAN
Problem: Safety  Goal: Will remain free from falls  Outcome: PROGRESSING AS EXPECTED  Note: Pt is compliant with using call light and asking for help when needed. Pt wears treaded slipper socks. Pt's bed is in lowest and locked position. Pt's belongings are within reach. Pt's call light is within reach. Pt ambulates appropriately.     Problem: Venous Thromboembolism (VTW)/Deep Vein Thrombosis (DVT) Prevention:  Goal: Patient will participate in Venous Thrombosis (VTE)/Deep Vein Thrombosis (DVT)Prevention Measures  Outcome: PROGRESSING AS EXPECTED  Flowsheets (Taken 6/21/2020 1000)  Mechanical Prophylaxis: SCDs, Sequential Compression Device  Note: Pt's SCD's are on. Pt is compliant with wearing SCD's.

## 2020-06-22 ENCOUNTER — ANESTHESIA EVENT (OUTPATIENT)
Dept: SURGERY | Facility: MEDICAL CENTER | Age: 65
DRG: 445 | End: 2020-06-22
Payer: MEDICARE

## 2020-06-22 ENCOUNTER — ANESTHESIA (OUTPATIENT)
Dept: SURGERY | Facility: MEDICAL CENTER | Age: 65
DRG: 445 | End: 2020-06-22
Payer: MEDICARE

## 2020-06-22 ENCOUNTER — APPOINTMENT (OUTPATIENT)
Dept: RADIOLOGY | Facility: MEDICAL CENTER | Age: 65
DRG: 445 | End: 2020-06-22
Attending: PODIATRIST
Payer: MEDICARE

## 2020-06-22 LAB
ALBUMIN SERPL BCP-MCNC: 3 G/DL (ref 3.2–4.9)
ALBUMIN/GLOB SERPL: 1.6 G/DL
ALP SERPL-CCNC: 306 U/L (ref 30–99)
ALT SERPL-CCNC: 80 U/L (ref 2–50)
ANION GAP SERPL CALC-SCNC: 10 MMOL/L (ref 7–16)
APTT PPP: 31.7 SEC (ref 24.7–36)
AST SERPL-CCNC: 69 U/L (ref 12–45)
BASOPHILS # BLD AUTO: 1 % (ref 0–1.8)
BASOPHILS # BLD: 0.05 K/UL (ref 0–0.12)
BILIRUB SERPL-MCNC: 20 MG/DL (ref 0.1–1.5)
BUN SERPL-MCNC: 13 MG/DL (ref 8–22)
CALCIUM SERPL-MCNC: 8.6 MG/DL (ref 8.4–10.2)
CHLORIDE SERPL-SCNC: 105 MMOL/L (ref 96–112)
CO2 SERPL-SCNC: 22 MMOL/L (ref 20–33)
CREAT SERPL-MCNC: 0.41 MG/DL (ref 0.5–1.4)
EOSINOPHIL # BLD AUTO: 0.29 K/UL (ref 0–0.51)
EOSINOPHIL NFR BLD: 5.7 % (ref 0–6.9)
ERYTHROCYTE [DISTWIDTH] IN BLOOD BY AUTOMATED COUNT: 64.4 FL (ref 35.9–50)
GLOBULIN SER CALC-MCNC: 1.9 G/DL (ref 1.9–3.5)
GLUCOSE SERPL-MCNC: 105 MG/DL (ref 65–99)
HCT VFR BLD AUTO: 26.6 % (ref 42–52)
HGB BLD-MCNC: 9.2 G/DL (ref 14–18)
IMM GRANULOCYTES # BLD AUTO: 0.02 K/UL (ref 0–0.11)
IMM GRANULOCYTES NFR BLD AUTO: 0.4 % (ref 0–0.9)
INR PPP: 1.19 (ref 0.87–1.13)
LYMPHOCYTES # BLD AUTO: 1.19 K/UL (ref 1–4.8)
LYMPHOCYTES NFR BLD: 23.4 % (ref 22–41)
MCH RBC QN AUTO: 31.5 PG (ref 27–33)
MCHC RBC AUTO-ENTMCNC: 34.6 G/DL (ref 33.7–35.3)
MCV RBC AUTO: 91.1 FL (ref 81.4–97.8)
MONOCYTES # BLD AUTO: 0.46 K/UL (ref 0–0.85)
MONOCYTES NFR BLD AUTO: 9.1 % (ref 0–13.4)
NEUTROPHILS # BLD AUTO: 3.07 K/UL (ref 1.82–7.42)
NEUTROPHILS NFR BLD: 60.4 % (ref 44–72)
NRBC # BLD AUTO: 0 K/UL
NRBC BLD-RTO: 0 /100 WBC
PLATELET # BLD AUTO: 213 K/UL (ref 164–446)
PMV BLD AUTO: 12.4 FL (ref 9–12.9)
POTASSIUM SERPL-SCNC: 3.8 MMOL/L (ref 3.6–5.5)
PROT SERPL-MCNC: 4.9 G/DL (ref 6–8.2)
PROTHROMBIN TIME: 15.3 SEC (ref 12–14.6)
RBC # BLD AUTO: 2.92 M/UL (ref 4.7–6.1)
SODIUM SERPL-SCNC: 137 MMOL/L (ref 135–145)
WBC # BLD AUTO: 5.1 K/UL (ref 4.8–10.8)

## 2020-06-22 PROCEDURE — 500066 HCHG BITE BLOCK, ECT: Performed by: INTERNAL MEDICINE

## 2020-06-22 PROCEDURE — BF111ZZ FLUOROSCOPY OF BILIARY AND PANCREATIC DUCTS USING LOW OSMOLAR CONTRAST: ICD-10-PCS | Performed by: INTERNAL MEDICINE

## 2020-06-22 PROCEDURE — 700111 HCHG RX REV CODE 636 W/ 250 OVERRIDE (IP): Performed by: ANESTHESIOLOGY

## 2020-06-22 PROCEDURE — 160048 HCHG OR STATISTICAL LEVEL 1-5: Performed by: INTERNAL MEDICINE

## 2020-06-22 PROCEDURE — 160009 HCHG ANES TIME/MIN: Performed by: INTERNAL MEDICINE

## 2020-06-22 PROCEDURE — 503093 HCHG BRUSH, CYTOLOGY: Performed by: INTERNAL MEDICINE

## 2020-06-22 PROCEDURE — C2617 STENT, NON-COR, TEM W/O DEL: HCPCS | Performed by: INTERNAL MEDICINE

## 2020-06-22 PROCEDURE — 700102 HCHG RX REV CODE 250 W/ 637 OVERRIDE(OP): Performed by: INTERNAL MEDICINE

## 2020-06-22 PROCEDURE — 85730 THROMBOPLASTIN TIME PARTIAL: CPT

## 2020-06-22 PROCEDURE — 0FDG8ZX EXTRACTION OF PANCREAS, VIA NATURAL OR ARTIFICIAL OPENING ENDOSCOPIC, DIAGNOSTIC: ICD-10-PCS | Performed by: INTERNAL MEDICINE

## 2020-06-22 PROCEDURE — 88307 TISSUE EXAM BY PATHOLOGIST: CPT

## 2020-06-22 PROCEDURE — 0F798DZ DILATION OF COMMON BILE DUCT WITH INTRALUMINAL DEVICE, VIA NATURAL OR ARTIFICIAL OPENING ENDOSCOPIC: ICD-10-PCS | Performed by: INTERNAL MEDICINE

## 2020-06-22 PROCEDURE — 160035 HCHG PACU - 1ST 60 MINS PHASE I: Performed by: INTERNAL MEDICINE

## 2020-06-22 PROCEDURE — 0FDD8ZX EXTRACTION OF PANCREATIC DUCT, VIA NATURAL OR ARTIFICIAL OPENING ENDOSCOPIC, DIAGNOSTIC: ICD-10-PCS | Performed by: INTERNAL MEDICINE

## 2020-06-22 PROCEDURE — 88112 CYTOPATH CELL ENHANCE TECH: CPT

## 2020-06-22 PROCEDURE — A9270 NON-COVERED ITEM OR SERVICE: HCPCS | Performed by: INTERNAL MEDICINE

## 2020-06-22 PROCEDURE — 700105 HCHG RX REV CODE 258: Performed by: INTERNAL MEDICINE

## 2020-06-22 PROCEDURE — 80053 COMPREHEN METABOLIC PANEL: CPT

## 2020-06-22 PROCEDURE — 74328 X-RAY BILE DUCT ENDOSCOPY: CPT

## 2020-06-22 PROCEDURE — 700101 HCHG RX REV CODE 250: Performed by: ANESTHESIOLOGY

## 2020-06-22 PROCEDURE — 0FD98ZX EXTRACTION OF COMMON BILE DUCT, VIA NATURAL OR ARTIFICIAL OPENING ENDOSCOPIC, DIAGNOSTIC: ICD-10-PCS | Performed by: INTERNAL MEDICINE

## 2020-06-22 PROCEDURE — 88173 CYTOPATH EVAL FNA REPORT: CPT

## 2020-06-22 PROCEDURE — 700105 HCHG RX REV CODE 258: Performed by: HOSPITALIST

## 2020-06-22 PROCEDURE — 85610 PROTHROMBIN TIME: CPT

## 2020-06-22 PROCEDURE — 88305 TISSUE EXAM BY PATHOLOGIST: CPT

## 2020-06-22 PROCEDURE — 36415 COLL VENOUS BLD VENIPUNCTURE: CPT

## 2020-06-22 PROCEDURE — 770006 HCHG ROOM/CARE - MED/SURG/GYN SEMI*

## 2020-06-22 PROCEDURE — 85025 COMPLETE CBC W/AUTO DIFF WBC: CPT

## 2020-06-22 PROCEDURE — C1769 GUIDE WIRE: HCPCS | Performed by: INTERNAL MEDICINE

## 2020-06-22 PROCEDURE — 160002 HCHG RECOVERY MINUTES (STAT): Performed by: INTERNAL MEDICINE

## 2020-06-22 PROCEDURE — 502240 HCHG MISC OR SUPPLY RC 0272: Performed by: INTERNAL MEDICINE

## 2020-06-22 PROCEDURE — 160208 HCHG ENDO MINUTES - EA ADDL 1 MIN LEVEL 4: Performed by: INTERNAL MEDICINE

## 2020-06-22 PROCEDURE — 160203 HCHG ENDO MINUTES - 1ST 30 MINS LEVEL 4: Performed by: INTERNAL MEDICINE

## 2020-06-22 PROCEDURE — 99233 SBSQ HOSP IP/OBS HIGH 50: CPT | Performed by: HOSPITALIST

## 2020-06-22 PROCEDURE — 0DJ08ZZ INSPECTION OF UPPER INTESTINAL TRACT, VIA NATURAL OR ARTIFICIAL OPENING ENDOSCOPIC: ICD-10-PCS | Performed by: INTERNAL MEDICINE

## 2020-06-22 DEVICE — STENT BILIARY ADVANTIX 10FR X 7CM: Type: IMPLANTABLE DEVICE | Status: FUNCTIONAL

## 2020-06-22 RX ORDER — HYDROMORPHONE HYDROCHLORIDE 1 MG/ML
0.1 INJECTION, SOLUTION INTRAMUSCULAR; INTRAVENOUS; SUBCUTANEOUS
Status: DISCONTINUED | OUTPATIENT
Start: 2020-06-22 | End: 2020-06-22 | Stop reason: HOSPADM

## 2020-06-22 RX ORDER — LIDOCAINE HYDROCHLORIDE 20 MG/ML
INJECTION, SOLUTION EPIDURAL; INFILTRATION; INTRACAUDAL; PERINEURAL PRN
Status: DISCONTINUED | OUTPATIENT
Start: 2020-06-22 | End: 2020-06-22 | Stop reason: SURG

## 2020-06-22 RX ORDER — OXYCODONE HCL 5 MG/5 ML
5 SOLUTION, ORAL ORAL
Status: DISCONTINUED | OUTPATIENT
Start: 2020-06-22 | End: 2020-06-22 | Stop reason: HOSPADM

## 2020-06-22 RX ORDER — HYDROMORPHONE HYDROCHLORIDE 1 MG/ML
0.4 INJECTION, SOLUTION INTRAMUSCULAR; INTRAVENOUS; SUBCUTANEOUS
Status: DISCONTINUED | OUTPATIENT
Start: 2020-06-22 | End: 2020-06-22 | Stop reason: HOSPADM

## 2020-06-22 RX ORDER — SODIUM CHLORIDE, SODIUM LACTATE, POTASSIUM CHLORIDE, CALCIUM CHLORIDE 600; 310; 30; 20 MG/100ML; MG/100ML; MG/100ML; MG/100ML
1000 INJECTION, SOLUTION INTRAVENOUS CONTINUOUS
Status: CANCELLED | OUTPATIENT
Start: 2020-06-22 | End: 2020-06-23

## 2020-06-22 RX ORDER — SODIUM CHLORIDE, SODIUM LACTATE, POTASSIUM CHLORIDE, CALCIUM CHLORIDE 600; 310; 30; 20 MG/100ML; MG/100ML; MG/100ML; MG/100ML
INJECTION, SOLUTION INTRAVENOUS CONTINUOUS
Status: ACTIVE | OUTPATIENT
Start: 2020-06-22 | End: 2020-06-23

## 2020-06-22 RX ORDER — MEPERIDINE HYDROCHLORIDE 25 MG/ML
12.5 INJECTION INTRAMUSCULAR; INTRAVENOUS; SUBCUTANEOUS
Status: DISCONTINUED | OUTPATIENT
Start: 2020-06-22 | End: 2020-06-22 | Stop reason: HOSPADM

## 2020-06-22 RX ORDER — HYDROMORPHONE HYDROCHLORIDE 1 MG/ML
0.2 INJECTION, SOLUTION INTRAMUSCULAR; INTRAVENOUS; SUBCUTANEOUS
Status: DISCONTINUED | OUTPATIENT
Start: 2020-06-22 | End: 2020-06-22 | Stop reason: HOSPADM

## 2020-06-22 RX ORDER — ROCURONIUM BROMIDE 10 MG/ML
INJECTION, SOLUTION INTRAVENOUS PRN
Status: DISCONTINUED | OUTPATIENT
Start: 2020-06-22 | End: 2020-06-22 | Stop reason: SURG

## 2020-06-22 RX ORDER — ONDANSETRON 2 MG/ML
INJECTION INTRAMUSCULAR; INTRAVENOUS PRN
Status: DISCONTINUED | OUTPATIENT
Start: 2020-06-22 | End: 2020-06-22 | Stop reason: SURG

## 2020-06-22 RX ORDER — ONDANSETRON 2 MG/ML
4 INJECTION INTRAMUSCULAR; INTRAVENOUS
Status: DISCONTINUED | OUTPATIENT
Start: 2020-06-22 | End: 2020-06-22 | Stop reason: HOSPADM

## 2020-06-22 RX ORDER — OXYCODONE HCL 5 MG/5 ML
10 SOLUTION, ORAL ORAL
Status: DISCONTINUED | OUTPATIENT
Start: 2020-06-22 | End: 2020-06-22 | Stop reason: HOSPADM

## 2020-06-22 RX ORDER — MIDAZOLAM HYDROCHLORIDE 1 MG/ML
INJECTION INTRAMUSCULAR; INTRAVENOUS PRN
Status: DISCONTINUED | OUTPATIENT
Start: 2020-06-22 | End: 2020-06-22 | Stop reason: SURG

## 2020-06-22 RX ORDER — HYDRALAZINE HYDROCHLORIDE 20 MG/ML
5 INJECTION INTRAMUSCULAR; INTRAVENOUS
Status: DISCONTINUED | OUTPATIENT
Start: 2020-06-22 | End: 2020-06-22 | Stop reason: HOSPADM

## 2020-06-22 RX ORDER — DEXAMETHASONE SODIUM PHOSPHATE 4 MG/ML
INJECTION, SOLUTION INTRA-ARTICULAR; INTRALESIONAL; INTRAMUSCULAR; INTRAVENOUS; SOFT TISSUE PRN
Status: DISCONTINUED | OUTPATIENT
Start: 2020-06-22 | End: 2020-06-22 | Stop reason: SURG

## 2020-06-22 RX ORDER — DIPHENHYDRAMINE HYDROCHLORIDE 50 MG/ML
12.5 INJECTION INTRAMUSCULAR; INTRAVENOUS
Status: DISCONTINUED | OUTPATIENT
Start: 2020-06-22 | End: 2020-06-22 | Stop reason: HOSPADM

## 2020-06-22 RX ORDER — SODIUM CHLORIDE, SODIUM LACTATE, POTASSIUM CHLORIDE, CALCIUM CHLORIDE 600; 310; 30; 20 MG/100ML; MG/100ML; MG/100ML; MG/100ML
1000 INJECTION, SOLUTION INTRAVENOUS ONCE
Status: COMPLETED | OUTPATIENT
Start: 2020-06-22 | End: 2020-06-23

## 2020-06-22 RX ADMIN — DEXAMETHASONE SODIUM PHOSPHATE 6 MG: 4 INJECTION, SOLUTION INTRAMUSCULAR; INTRAVENOUS at 17:46

## 2020-06-22 RX ADMIN — MIDAZOLAM HYDROCHLORIDE 1 MG: 1 INJECTION, SOLUTION INTRAMUSCULAR; INTRAVENOUS at 16:31

## 2020-06-22 RX ADMIN — PROPOFOL 150 MG: 10 INJECTION, EMULSION INTRAVENOUS at 16:35

## 2020-06-22 RX ADMIN — SODIUM CHLORIDE: 9 INJECTION, SOLUTION INTRAVENOUS at 20:10

## 2020-06-22 RX ADMIN — LIDOCAINE HYDROCHLORIDE 80 MG: 20 INJECTION, SOLUTION EPIDURAL; INFILTRATION; INTRACAUDAL; PERINEURAL at 16:35

## 2020-06-22 RX ADMIN — SODIUM CHLORIDE, POTASSIUM CHLORIDE, SODIUM LACTATE AND CALCIUM CHLORIDE 1000 ML: 600; 310; 30; 20 INJECTION, SOLUTION INTRAVENOUS at 11:43

## 2020-06-22 RX ADMIN — ONDANSETRON 4 MG: 2 INJECTION INTRAMUSCULAR; INTRAVENOUS at 17:46

## 2020-06-22 RX ADMIN — POVIDONE-IODINE 15 ML: 10 SOLUTION TOPICAL at 16:19

## 2020-06-22 RX ADMIN — SUGAMMADEX 140 MG: 100 INJECTION, SOLUTION INTRAVENOUS at 17:46

## 2020-06-22 RX ADMIN — FENTANYL CITRATE 50 MCG: 50 INJECTION, SOLUTION INTRAMUSCULAR; INTRAVENOUS at 16:35

## 2020-06-22 RX ADMIN — SODIUM CHLORIDE, POTASSIUM CHLORIDE, SODIUM LACTATE AND CALCIUM CHLORIDE: 600; 310; 30; 20 INJECTION, SOLUTION INTRAVENOUS at 16:10

## 2020-06-22 RX ADMIN — ROCURONIUM BROMIDE 30 MG: 10 INJECTION, SOLUTION INTRAVENOUS at 16:35

## 2020-06-22 RX ADMIN — SODIUM CHLORIDE: 9 INJECTION, SOLUTION INTRAVENOUS at 08:12

## 2020-06-22 ASSESSMENT — ENCOUNTER SYMPTOMS
BLURRED VISION: 0
ABDOMINAL PAIN: 0
NAUSEA: 0
TINGLING: 0
PALPITATIONS: 0
EYE PAIN: 0
SHORTNESS OF BREATH: 0
COUGH: 0
DIZZINESS: 0
NECK PAIN: 0
SORE THROAT: 0
DEPRESSION: 0
WEIGHT LOSS: 1
FEVER: 0
BACK PAIN: 0
CHILLS: 0
INSOMNIA: 0

## 2020-06-22 ASSESSMENT — PAIN SCALES - GENERAL: PAIN_LEVEL: 0

## 2020-06-22 ASSESSMENT — FIBROSIS 4 INDEX: FIB4 SCORE: 2.35

## 2020-06-22 NOTE — PROGRESS NOTES
[facility] Medicine Daily Progress Note    Date of Service  6/22/2020    Chief Complaint  64 y.o. male admitted 6/20/2020 with jaundice.     Hospital Course    He was found to have a pancreatic mass. Gi was consulted and he was provided with supportive care. Imaging and labs showed evidence confirming a mass and an elevated . He was set up for an ERCP.        Interval Problem Update  6/21- I discussed him with radiology today regarding his CT. He is frustrated about not eating and not being on the ERCP schedule. I had the RN contact GI for an update on when his ERCP is planned. He has no pain.   6/22- ERCP planned for today. . He has no pain I reviewed his CT below. CA 19-9 markedly up.     CT abdomen pancrease:  1.  2.5 cm pancreatic head mass consistent with malignancy  2.  Associated biliary obstruction with marked dilation of the common bile duct, intrahepatic or system, and the gallbladder  3.  Negative for vascular encasement  4.  No enlarged lymph node in the peripancreatic spaces  5.  1.7 cm hyperdense left renal cyst which is Bosniak class II F, follow-up recommended  6.  Portal hypertension with associated dilated portal vein and right colonic wall thickening  7.  Aortic atherosclerotic plaque  8.  Minimal atelectasis      Consultants/Specialty  GI    Code Status  full    Disposition  tbd    Review of Systems  Review of Systems   Constitutional: Positive for malaise/fatigue and weight loss. Negative for chills and fever.   HENT: Negative for sore throat.    Eyes: Negative for blurred vision and pain.   Respiratory: Negative for cough and shortness of breath.    Cardiovascular: Negative for chest pain and palpitations.   Gastrointestinal: Negative for abdominal pain and nausea.   Genitourinary: Negative for dysuria and urgency.   Musculoskeletal: Negative for back pain and neck pain.   Skin: Negative for itching and rash.   Neurological: Negative for dizziness and tingling.   Psychiatric/Behavioral:  Negative for depression. The patient does not have insomnia.    All other systems reviewed and are negative.       Physical Exam  Temp:  [36.7 °C (98.1 °F)-36.9 °C (98.4 °F)] 36.9 °C (98.4 °F)  Pulse:  [58-65] 58  Resp:  [17-18] 18  BP: (126-140)/(56-94) 135/75  SpO2:  [97 %-100 %] 100 %    Physical Exam  Vitals signs and nursing note reviewed.   Constitutional:       General: He is not in acute distress.     Appearance: He is well-developed. He is not diaphoretic.      Comments: Patient seen and examined at the bedside. Plan discussed at bedside with the RN.   Thin appearing.    HENT:      Right Ear: External ear normal.      Left Ear: External ear normal.      Nose: Nose normal.   Eyes:      General: Scleral icterus present.         Right eye: No discharge.         Left eye: No discharge.   Neck:      Vascular: No JVD.      Trachea: No tracheal deviation.   Cardiovascular:      Rate and Rhythm: Normal rate.      Heart sounds: Normal heart sounds. No murmur.   Pulmonary:      Effort: Pulmonary effort is normal. No respiratory distress.      Breath sounds: Normal breath sounds. No wheezing or rales.   Abdominal:      General: Bowel sounds are normal. There is no distension.      Palpations: Abdomen is soft.      Tenderness: There is no abdominal tenderness. There is no guarding.   Musculoskeletal:         General: No tenderness.   Skin:     General: Skin is warm and dry.      Coloration: Skin is jaundiced.      Findings: No erythema.   Neurological:      Mental Status: He is alert and oriented to person, place, and time.   Psychiatric:         Behavior: Behavior normal.         Fluids    Intake/Output Summary (Last 24 hours) at 6/22/2020 0819  Last data filed at 6/22/2020 0751  Gross per 24 hour   Intake 840 ml   Output 2145 ml   Net -1305 ml       Laboratory  Recent Labs     06/20/20  1123 06/21/20  0341 06/22/20  0339   WBC 5.1 5.0 5.1   RBC 2.98* 2.99* 2.92*   HEMOGLOBIN 9.5* 9.4* 9.2*   HEMATOCRIT 28.0* 27.2*  26.6*   MCV 93.3 91.0 91.1   MCH 31.5 31.4 31.5   MCHC 33.8 34.6 34.6   RDW 64.0* 63.5* 64.4*   PLATELETCT 214 207 213   MPV 11.6 12.0 12.4     Recent Labs     06/20/20  1123 06/21/20  0341 06/22/20  0339   SODIUM 136 140 137   POTASSIUM 3.5* 3.6 3.8   CHLORIDE 103 108 105   CO2 23 20 22   GLUCOSE 140* 105* 105*   BUN 16 12 13   CREATININE 0.35* 0.41* 0.41*   CALCIUM 9.0 8.7 8.6     Recent Labs     06/20/20  1123 06/22/20  0339   APTT  --  31.7   INR 1.13 1.19*               Imaging  CT-ABDOMEN-PELVIS WITH & W/O   Final Result      1.  2.5 cm pancreatic head mass consistent with malignancy      2.  Associated biliary obstruction with marked dilation of the common bile duct, intrahepatic or system, and the gallbladder      3.  Negative for vascular encasement      4.  No enlarged lymph node in the peripancreatic spaces      5.  1.7 cm hyperdense left renal cyst which is Bosniak class II F, follow-up recommended      6.  Portal hypertension with associated dilated portal vein and right colonic wall thickening      7.  Aortic atherosclerotic plaque      8.  Minimal atelectasis      US-RUQ   Final Result      1.  2.3 cm pancreatic head mass highly suspicious for malignancy      2.  Intrahepatic biliary dilation, extrahepatic there are dilation, and gallbladder distention filled with sludge consistent with biliary obstruction likely from presumed pancreatic malignancy      3.  Hepatomegaly           Assessment/Plan  * Obstructive jaundice due to malignant neoplasm (HCC)  Assessment & Plan  Will need biliary stent placement and evaluation with surgery Dr. Flores in the near future. ERCP pending for today with biopsy with GI. Elevated ca19-9    Enlarged prostate with urinary obstruction  Assessment & Plan  Continue home flomax    Essential hypertension  Assessment & Plan  Continue lisinopril as tolerated by blood pressure    Pancreatic mass  Assessment & Plan  Likely malignancy, with biliary obstruction and elevated  bilirubin level  GI is following DHA, with planned ERCP and consideration for stent placement and biopsies    Normocytic anemia, not due to blood loss  Assessment & Plan  Monitor labs, likely from chronic disease       VTE prophylaxis: scd

## 2020-06-22 NOTE — RESPIRATORY CARE
COPD EDUCATION by COPD CLINICAL EDUCATOR  6/22/2020 at 3:17 PM by Lucero Molina, RRT     Patient reviewed by COPD education team. Patient does not have a history or diagnosis of COPD. Patient is a smoker, smoking cessation education refused.

## 2020-06-22 NOTE — PROGRESS NOTES
Received patient, resting in bed, alert and oriented, denies pain, in no apparent distress, Discussed POC, safety precautions in place, call light within reach.

## 2020-06-22 NOTE — PROGRESS NOTES
Gave report to night nurse Juan at 1900. Pt is resting comfortably in bed. Pts belongings at bedside. Bed in lowest and locked position.

## 2020-06-22 NOTE — OR NURSING
1620: Pt up to RR, tolerated it well. Call placed to Fransisco Marquis to see when he might be arriving. Pt is anxious that procedure will be cancelled, he was reassured that it would not be.

## 2020-06-22 NOTE — PROGRESS NOTES
Gastroenterology Progress Note     Author: Melvin Marquis M.D.   Date & Time Created: 2020 4:23 PM    Chief Complaint:  jaundice    Interval History:  No complaints    Review of Systems:  Review of Systems   All other systems reviewed and are negative.      Physical Exam:  Physical Exam  Eyes:      General: Scleral icterus present.   Cardiovascular:      Rate and Rhythm: Normal rate and regular rhythm.   Pulmonary:      Effort: Pulmonary effort is normal.      Breath sounds: Normal breath sounds.   Abdominal:      General: Abdomen is flat. Bowel sounds are normal. There is no distension.      Palpations: Abdomen is soft.   Neurological:      Mental Status: He is alert.         Labs:          Recent Labs     20  033   SODIUM 136 140 137   POTASSIUM 3.5* 3.6 3.8   CHLORIDE 103 108 105   CO2  22   BUN 16 12 13   CREATININE 0.35* 0.41* 0.41*   MAGNESIUM 1.4*  --   --    PHOSPHORUS 2.3*  --   --    CALCIUM 9.0 8.7 8.6     Recent Labs     20  033   ALTSGPT 90* 83* 80*   ASTSGOT 78* 73* 69*   ALKPHOSPHAT 334* 329* 306*   TBILIRUBIN 23.9* 21.3* 20.0*   LIPASE 27  --   --    GLUCOSE 140* 105* 105*     Recent Labs     20  033   RBC 2.98* 2.99* 2.92*   HEMOGLOBIN 9.5* 9.4* 9.2*   HEMATOCRIT 28.0* 27.2* 26.6*   PLATELETCT 214 207 213   PROTHROMBTM 14.7*  --  15.3*   APTT  --   --  31.7   INR 1.13  --  1.19*   IRON  --  132  --    TOTIRONBC  --  178*  --      Recent Labs     20  033   WBC 5.1 5.0 5.1   NEUTSPOLYS 66.00 59.10 60.40   LYMPHOCYTES 25.00 25.10 23.40   MONOCYTES 3.00 10.00 9.10   EOSINOPHILS 5.00 4.80 5.70   BASOPHILS 1.00 0.80 1.00   ASTSGOT 78* 73* 69*   ALTSGPT 90* 83* 80*   ALKPHOSPHAT 334* 329* 306*   TBILIRUBIN 23.9* 21.3* 20.0*     Hemodynamics:  Temp (24hrs), Av.8 °C (98.2 °F), Min:36.7 °C (98.1 °F), Max:36.9 °C (98.4 °F)  Temperature: 36.7 °C  (98.1 °F)  Pulse  Av.5  Min: 56  Max: 71   Blood Pressure : (!) 174/84     Respiratory:    Respiration: 16, Pulse Oximetry: 98 %           Fluids:    Intake/Output Summary (Last 24 hours) at 2020 1623  Last data filed at 2020 1322  Gross per 24 hour   Intake --   Output 2395 ml   Net -2395 ml     Weight: 70.1 kg (154 lb 8.7 oz)  GI/Nutrition:  Orders Placed This Encounter   Procedures   • Diet NPO     Standing Status:   Standing     Number of Occurrences:   8     Order Specific Question:   Restrict to:     Answer:   Strict [1]     Medical Decision Making, by Problem:  Active Hospital Problems    Diagnosis   • *Obstructive jaundice due to malignant neoplasm (HCC) [K83.1, C80.1]   • Normocytic anemia, not due to blood loss [D64.9]   • Pancreatic mass [K86.89]   • Essential hypertension [I10]   • Enlarged prostate with urinary obstruction [N40.1, N13.8]       Plan:  Obstructive jaundice - EUS ERCP today    Quality-Core Measures

## 2020-06-22 NOTE — OR NURSING
1523: Pt brought to pre-op holding via WC, tolerated it well, able to stand and walk to the bed without assistance. Report rcvd earlier from Stacey and Chante, RNs. No pain or nausea, awake and alert, on room air.  1557: Patient allergies and NPO status verified, home medication reconciliation completed and belongings secured. Patient verbalizes understanding of pain scale, expected course of stay and plan of care. Surgical site verified with patient. IV patency confirmed.

## 2020-06-22 NOTE — ANESTHESIA PROCEDURE NOTES
Airway    Date/Time: 6/22/2020 4:37 PM  Performed by: Raheem Fletcher M.D.  Authorized by: Raheem Fletcher M.D.     Location:  OR  Urgency:  Elective  Difficult Airway: No    Indications for Airway Management:  Anesthesia      Spontaneous Ventilation: absent    Sedation Level:  Deep  Preoxygenated: Yes    Patient Position:  Sniffing  Mask Difficulty Assessment:  1 - vent by mask  Final Airway Type:  Endotracheal airway  Final Endotracheal Airway:  ETT  Cuffed: Yes    Technique Used for Successful ETT Placement:  Video laryngoscopy    Insertion Site:  Oral  Blade Type:  Humza  Laryngoscope Blade/Videolaryngoscope Blade Size:  4  ETT Size (mm):  7.0  Measured from:  Lips  ETT to Lips (cm):  23  Placement Verified by: auscultation and capnometry    Cormack-Lehane Classification:  Grade I - full view of glottis  Number of Attempts at Approach:  1  Number of Other Approaches Attempted:  0

## 2020-06-22 NOTE — OR NURSING
1756: To PACU from OR via bed, Patient is on 6 lpm of oxygen via mask, he appears to be comfortable at this time.     1810: No change, patient still sleeping with supplemental oxygen in place at 6 lpm via mask.    1815: Patient awakened.     1820: Patient put dentures in place, Daughter updated by telephone, Patient transitioned to room air and is denying pain and nausea.    1825: Meets criteria to transfer to return to the floor, Room 201, bed 1. SHERYL Sharif given report.

## 2020-06-22 NOTE — CARE PLAN
Problem: Communication  Goal: The ability to communicate needs accurately and effectively will improve  Outcome: PROGRESSING AS EXPECTED  Note: Pt is able to effectively communicate needs.     Problem: Safety  Goal: Will remain free from falls  Outcome: PROGRESSING AS EXPECTED  Note: Pt calls for help appropriately. Pt is steady when ambulating. Pt wears treaded socks.

## 2020-06-22 NOTE — ANESTHESIA PREPROCEDURE EVALUATION
63 y/o male w/ obstructive jaundice secondary to a pancreatic head mass highly suspicious for malignancy.    Relevant Problems   ANESTHESIA (within normal limits)      CARDIAC   (+) Essential hypertension      Other   (+) Enlarged prostate with urinary obstruction   (+) Normocytic anemia, not due to blood loss   (+) Obstructive jaundice due to malignant neoplasm (HCC)   (+) Pancreatic mass   Smokes tobacco and marijuana occasionally;    Physical Exam    Airway   Mallampati: I  TM distance: >3 FB  Neck ROM: full       Cardiovascular - normal exam  Rhythm: regular  Rate: normal  (-) murmur     Dental - normal exam  (+) upper dentures, lower dentures           Pulmonary - normal exam  Breath sounds clear to auscultation     Abdominal    Neurological - normal exam               Anesthesia Plan    ASA 2       Plan - general       Airway plan will be ETT      Plan Factors:   Patient was not previously instructed to abstain from smoking on day of procedure.  Patient did not smoke on day of procedure.      Induction: intravenous    Postoperative Plan: Postoperative administration of opioids is intended.    Pertinent diagnostic labs and testing reviewed    Informed Consent:    Anesthetic plan and risks discussed with patient.    Use of blood products discussed with: patient whom consented to blood products.

## 2020-06-23 ENCOUNTER — PATIENT OUTREACH (OUTPATIENT)
Dept: HEALTH INFORMATION MANAGEMENT | Facility: OTHER | Age: 65
End: 2020-06-23

## 2020-06-23 VITALS
BODY MASS INDEX: 19.83 KG/M2 | RESPIRATION RATE: 18 BRPM | DIASTOLIC BLOOD PRESSURE: 65 MMHG | HEIGHT: 74 IN | OXYGEN SATURATION: 100 % | WEIGHT: 154.54 LBS | TEMPERATURE: 98.7 F | HEART RATE: 63 BPM | SYSTOLIC BLOOD PRESSURE: 123 MMHG

## 2020-06-23 LAB — PATHOLOGY CONSULT NOTE: NORMAL

## 2020-06-23 PROCEDURE — 99239 HOSP IP/OBS DSCHRG MGMT >30: CPT | Performed by: INTERNAL MEDICINE

## 2020-06-23 PROCEDURE — A9270 NON-COVERED ITEM OR SERVICE: HCPCS | Performed by: INTERNAL MEDICINE

## 2020-06-23 PROCEDURE — 700102 HCHG RX REV CODE 250 W/ 637 OVERRIDE(OP): Performed by: INTERNAL MEDICINE

## 2020-06-23 RX ADMIN — TAMSULOSIN HYDROCHLORIDE 0.4 MG: 0.4 CAPSULE ORAL at 08:05

## 2020-06-23 RX ADMIN — LISINOPRIL 10 MG: 5 TABLET ORAL at 05:12

## 2020-06-23 ASSESSMENT — ENCOUNTER SYMPTOMS
DIZZINESS: 0
VOMITING: 0
MYALGIAS: 0
NERVOUS/ANXIOUS: 0
CONSTIPATION: 0
ABDOMINAL PAIN: 0
SORE THROAT: 0
HEADACHES: 0
SPUTUM PRODUCTION: 0
BACK PAIN: 0
TINGLING: 0
CHILLS: 0
DIAPHORESIS: 0
NAUSEA: 0
FEVER: 0
PALPITATIONS: 0
BLOOD IN STOOL: 0
DEPRESSION: 0
SHORTNESS OF BREATH: 0
COUGH: 0
SINUS PAIN: 0

## 2020-06-23 NOTE — PROGRESS NOTES
Gave report to night nurse Harley at 1850 Pt is resting comfortably. Pts belongings at bedside. Bed in lowest and locked position. Call light is within reach.

## 2020-06-23 NOTE — PROGRESS NOTES
Went over DC instructions with patients. All questions answered.  setting up patient's 2 follow up appointments. IV DC'ed. Awaiting ride home from daughter.

## 2020-06-23 NOTE — PROGRESS NOTES
Hospital Medicine Daily Progress Note    Date of Service  6/23/2020    Chief Complaint  64 y.o. male admitted 6/20/2020 with yellowing skin    Hospital Course    This is a 64 y.o. male who presented 6/20/2020 with yellowing skin for 3 weeks. He did see a physician at the [facility] where he lives and has a an appointment with a GI doctor in [place] for June 27. His yellowing was getting worse so he came to the hospital here for evaluation as he felt waiting until June 27 was too long. He has light loose stool, dark urine and yellowing of his skin and eyes getting worse.  CA 19-9 is elevated and on 6/22 ERCP was done with biliary stent placed and biopsies taken.       Interval Problem Update  The patient denies pain    Consultants/Specialty  GI Dr. Marquis    Code Status  full    Disposition  home    Review of Systems  Review of Systems   Constitutional: Negative for chills, diaphoresis, fever and malaise/fatigue.   HENT: Negative for congestion, sinus pain and sore throat.    Respiratory: Negative for cough, sputum production and shortness of breath.    Cardiovascular: Negative for chest pain, palpitations and leg swelling.   Gastrointestinal: Negative for abdominal pain, blood in stool, constipation, nausea and vomiting.   Genitourinary: Negative for dysuria, frequency and urgency.   Musculoskeletal: Negative for back pain and myalgias.   Skin: Positive for itching. Negative for rash.   Neurological: Negative for dizziness, tingling and headaches.   Psychiatric/Behavioral: Negative for depression. The patient is not nervous/anxious.         Physical Exam  Temp:  [36.3 °C (97.3 °F)-37.2 °C (99 °F)] 37.1 °C (98.7 °F)  Pulse:  [52-82] 66  Resp:  [16-18] 18  BP: (122-175)/(58-90) 122/69  SpO2:  [97 %-100 %] 97 %    Physical Exam  Vitals signs and nursing note reviewed.   Constitutional:       Appearance: He is ill-appearing. He is not diaphoretic.   HENT:      Nose: No congestion or rhinorrhea.       Mouth/Throat:      Mouth: Mucous membranes are moist.      Pharynx: No oropharyngeal exudate.   Eyes:      General: Scleral icterus present.      Extraocular Movements: Extraocular movements intact.      Pupils: Pupils are equal, round, and reactive to light.   Neck:      Musculoskeletal: Neck supple. No muscular tenderness.   Cardiovascular:      Rate and Rhythm: Normal rate and regular rhythm.      Heart sounds: Normal heart sounds. No friction rub.   Pulmonary:      Breath sounds: Normal breath sounds. No wheezing.   Abdominal:      General: Bowel sounds are normal. There is no distension.      Palpations: There is no mass.   Musculoskeletal:         General: No tenderness.   Skin:     General: Skin is warm.      Capillary Refill: Capillary refill takes less than 2 seconds.      Coloration: Skin is jaundiced. Skin is not pale.   Neurological:      Mental Status: He is alert and oriented to person, place, and time.   Psychiatric:         Mood and Affect: Mood normal.         Behavior: Behavior normal.         Fluids    Intake/Output Summary (Last 24 hours) at 6/23/2020 0801  Last data filed at 6/23/2020 0500  Gross per 24 hour   Intake 2667.83 ml   Output 2605 ml   Net 62.83 ml       Laboratory  Recent Labs     06/20/20  1123 06/21/20  0341 06/22/20  0339   WBC 5.1 5.0 5.1   RBC 2.98* 2.99* 2.92*   HEMOGLOBIN 9.5* 9.4* 9.2*   HEMATOCRIT 28.0* 27.2* 26.6*   MCV 93.3 91.0 91.1   MCH 31.5 31.4 31.5   MCHC 33.8 34.6 34.6   RDW 64.0* 63.5* 64.4*   PLATELETCT 214 207 213   MPV 11.6 12.0 12.4     Recent Labs     06/20/20  1123 06/21/20  0341 06/22/20  0339   SODIUM 136 140 137   POTASSIUM 3.5* 3.6 3.8   CHLORIDE 103 108 105   CO2 23 20 22   GLUCOSE 140* 105* 105*   BUN 16 12 13   CREATININE 0.35* 0.41* 0.41*   CALCIUM 9.0 8.7 8.6     Recent Labs     06/20/20  1123 06/22/20  0339   APTT  --  31.7   INR 1.13 1.19*               Imaging  [id number]   Final Result      Intraoperative image as above described.       CT-ABDOMEN-PELVIS WITH & W/O   Final Result      1.  2.5 cm pancreatic head mass consistent with malignancy      2.  Associated biliary obstruction with marked dilation of the common bile duct, intrahepatic or system, and the gallbladder      3.  Negative for vascular encasement      4.  No enlarged lymph node in the peripancreatic spaces      5.  1.7 cm hyperdense left renal cyst which is Bosniak class II F, follow-up recommended      6.  Portal hypertension with associated dilated portal vein and right colonic wall thickening      7.  Aortic atherosclerotic plaque      8.  Minimal atelectasis      US-RUQ   Final Result      1.  2.3 cm pancreatic head mass highly suspicious for malignancy      2.  Intrahepatic biliary dilation, extrahepatic there are dilation, and gallbladder distention filled with sludge consistent with biliary obstruction likely from presumed pancreatic malignancy      3.  Hepatomegaly      DX-PORTABLE FLUORO > 1 HOUR    (Results Pending)        Assessment/Plan  * Obstructive jaundice due to malignant neoplasm (HCC)  Assessment & Plan  ERCP done with stent placed, follow lab trend  Will need evaluation with surgery Dr. Flores in the near future when pathology is back from biopsy  Elevated ca19-9    Enlarged prostate with urinary obstruction  Assessment & Plan  Continue home flomax    Essential hypertension  Assessment & Plan  Continue lisinopril     Pancreatic mass  Assessment & Plan  Likely malignancy, with biliary obstruction and elevated bilirubin level  GI is following DHA, will need follow up with oncology and DR. Flores after pathology is back    Normocytic anemia, not due to blood loss  Assessment & Plan  Stable, due to chronic disease         VTE prophylaxis: scd's

## 2020-06-23 NOTE — ANESTHESIA POSTPROCEDURE EVALUATION
Patient: Janes Cody    Procedure Summary     Date:  06/22/20 Room / Location:   ENDOSCOPIC ULTRASOUND ROOM / SURGERY [facility]    Anesthesia Start:  1631 Anesthesia Stop:  1759    Procedures:       ERCP, DIAGNOSTIC      ESOPHAGOSCOPY, WITH ENDOSCOPIC US Diagnosis:       Mass of pancreas      (Mass of pancreas [[id number]])    Surgeon:  Melvin Marquis M.D. Responsible Provider:  Raheem Fletcher M.D.    Anesthesia Type:  general ASA Status:  2          Final Anesthesia Type: general  Last vitals  BP   Blood Pressure : (Abnormal) 175/77    Temp   37.2 °C (99 °F)    Pulse   Pulse: (Abnormal) 52   Resp   16    SpO2   100 %      Anesthesia Post Evaluation    Patient location during evaluation: PACU  Patient participation: complete - patient participated  Level of consciousness: awake and alert  Pain score: 0    Airway patency: patent  Anesthetic complications: no  Cardiovascular status: hemodynamically stable  Respiratory status: acceptable  Hydration status: euvolemic    PONV: none           Nurse Pain Score: 0 (NPRS)

## 2020-06-23 NOTE — OP REPORT
DATE OF SERVICE:  06/22/2020     PROCEDURE PERFORMED:  Endoscopic ultrasound with fine needle aspiration, Endoscopic retrograde cholangiography with sphincterotomy, bile duct brushing, and stent placement     CONSENT:  Procedure risks and benefits reviewed thoroughly with the patient, risks including but not limited to bleeding, perforation, side effects of medication were informed.  Patient voiced understanding and agreed to proceed.  Additional risks inherent to ERCP that being mild, moderate, severe pancreatitis that could lead to postprocedural pain, prolonged hospitalization, intensive care unit stay, and/or death were reviewed with the patient who voiced understanding and agreed to proceed.     PREPROCEDURE DIAGNOSIS:  pancreatic mass, obstructive jaundice     POSTPROCEDURE DIAGNOSES:  pancreatic mass, obstructive jaundice     PROCEDURE DESCRIPTION:  The patient was placed in the prone position after sedation and intubation by the anesthesiologist. Vital signs were monitored continuously throughout the procedure.  When ready, the upper endoscope was placed in the patient's mouth and advanced easily and carefully to the esophagus and subsequently to the stomach and duodenum. The scope was then slowly withdrawn and mucosa examined. Retroflexion was performed in the stomach. The EGD scope was removed.    The EUS scope was then selected. The scope was placed in the mouth and advanced with semi-direct visualization through the oropharynx to the esophagus, stomach and duodenum. Ultrasound images are noted below. Following the EUS, the ERCP was performed as outlined below.    EGD: normal esophagus, stomach and duodenum    EUS: The linear scope was selected. The gastric wall appeared normal. The liver appeared normal. There was extensive intrahepatic ductal dilation. The aorta, celiac artery and SMA appeared normal. The left kidney, left adrenal and spleen appeared normal. The pancreas appeared homogeneous. The PD  measured 5.4mm in the body and was dilated. The scope was advanced to the duodenal bulb. The duodenal wall appeared normal. The CBD measured 16mm and was massiverly dilated. There was abrupt cutoff of the bile duct at the level of the pancreatic head. At this site, there was a large hypoechoic mass measuring 31 x 18mm in diameter. This appeared malignant. The mass appeared to abut the portal venous confluence with concern for invasion of the PV due to loss of interface in this area. After inspection, FNA was performed using a 22g [employer] fine needle biopsy needle. A total of 4 passes were made. Note that there was not a pathologist present for on-site interpretation. The scope was advanced to D2 and the uncinate appeared normal. The scope was withdrawn to the esophagus. The esophageal wall was normal.    ERCP:  After completion of the EUS/EGD, a side-viewing duodenoscope was passed carefully and easily under semi-direct visualization into the esophagus and passed through the stomach to the duodenum. Upon reaching the second portion of the duodenum, the scope was withdrawn to the short-position.The ampulla was identified. The ampulla appeared normal.    Using a [employer] Rx44 sphincterotome preloaded with a 0.035 wire the CBD was cannulated. The wire was advanced under fluoroscopy to the level of the intrahepatics and the sphincterotome was advanced over the wire. Bile was aspirated and then contrast was injected to obtain a cholangiogram. The cholangiogram was entirely interpreted by myself during the exam. The cholangiogram revealed a dilated proximal bile duct to 18mm. There was a malignant appearing stricture approximately 2cm in length in the mid-CBD. The distal CBD measuring 5mm in diameter and appeared normal. The sphincterotome was then withdrawn to the papilla and an adequate sphincterotomy was performed. The sphincterotome was then withdrawn and exchanged for a biliary brush. The stricture in the  mid-CBD was brushed multiple times. Finally, the bile duct was stented to relieve the obstruction. A 10F x 7cm plastic stent was placed in the CBD with free spontaneous flow of bile at completion of the procedure. All instruments were removed and the procedure was completed.         COMPLICATIONS:  None.     BLOOD LOSS:  None.     SPECIMENS:  None.    SUMMARY:  1.) EUS revealing dilated intra and extrahepatic bile ducts  2.) Pancreatic head mass approximately 3cm in diameter with loss of interface at the portal vein. Findings would be concerning for unresectable malignancy. FNA performed  3.) ERCP with CBD cannulation  4.) Cholangiogram revealing a 2cm long mid-CBD stricture  5.) ERCP with sphincterotomy followed by bile duct brushing  6.) successful biliary stent placement     RECOMMENDATIONS:   1.) f/u biopsies and brushing  2.) restart diet  3.) consult Dr Flores and Oncology when pathology has returned  4.) Repeat ERCP for stent change in 8 weeks

## 2020-06-23 NOTE — DISCHARGE INSTRUCTIONS
Discharge Instructions per Gardenia Moser M.D.    Follow up with GI in 6-8 weeks for stent replacement  Follow up with Dr. Flores as soon as possible for biopsy results and treatment options    DIET: regular    ACTIVITY: as tolerated    DIAGNOSIS: pancreatic mass    Return to ER if symptoms worsen      Discharge Instructions    Discharged to home by car with relative. Discharged via wheelchair, hospital escort: Yes.  Special equipment needed: Not Applicable    Be sure to schedule a follow-up appointment with your primary care doctor or any specialists as instructed.     Discharge Plan:   Diet Plan: Discussed  Activity Level: Discussed  Smoking Cessation Offered: Patient Refused  Confirmed Follow up Appointment: Patient to Call and Schedule Appointment  Confirmed Symptoms Management: Discussed  Medication Reconciliation Updated: Yes    I understand that a diet low in cholesterol, fat, and sodium is recommended for good health. Unless I have been given specific instructions below for another diet, I accept this instruction as my diet prescription.   Other diet: Regular    Special Instructions: None    · Is patient discharged on Warfarin / Coumadin?   No     Depression / Suicide Risk    As you are discharged from this [facility] facility, it is important to learn how to keep safe from harming yourself.    Recognize the warning signs:  · Abrupt changes in personality, positive or negative- including increase in energy   · Giving away possessions  · Change in eating patterns- significant weight changes-  positive or negative  · Change in sleeping patterns- unable to sleep or sleeping all the time   · Unwillingness or inability to communicate  · Depression  · Unusual sadness, discouragement and loneliness  · Talk of wanting to die  · Neglect of personal appearance   · Rebelliousness- reckless behavior  · Withdrawal from people/activities they love  · Confusion- inability to concentrate     If you or a loved  one observes any of these behaviors or has concerns about self-harm, here's what you can do:  · Talk about it- your feelings and reasons for harming yourself  · Remove any means that you might use to hurt yourself (examples: pills, rope, extension cords, firearm)  · Get professional help from the community (Mental Health, Substance Abuse, psychological counseling)  · Do not be alone:Call your Safe Contact- someone whom you trust who will be there for you.  · Call your local CRISIS HOTLINE 659-1215 or 073-276-4984  · Call your local Children's Mobile Crisis Response Team [place] (961) 266-7827 or www.CleanTie  · Call the toll free National Suicide Prevention Hotlines   · National Suicide Prevention Lifeline 089-173-BNXK (2742)  · National Hope Line Network 800-SUICIDE (021-2127)

## 2020-06-23 NOTE — PROGRESS NOTES
"Report received from SHERYL Sharif.    Pt AAOx4. Denies any discomfort, pain, SOB, nausea. Pt's urinated on urinal - tea coloered urine output which pt's stated \"it has gotten better in color.\" VSS post ERCP. POC discussed with pt. Safety and comfort measures in place. No additional needs at this time.  "

## 2020-06-23 NOTE — ANESTHESIA TIME REPORT
Anesthesia Start and Stop Event Times     Date Time Event    6/22/2020 03:36 PM Ready for Procedure    6/22/2020 04:31 PM Anesthesia Start    6/22/2020 05:59 PM Anesthesia Stop        Responsible Staff  06/22/20    Name Role Begin End    Raheem Fletcher M.D. Anesthesiologist 06/22/20 04:31 PM 06/22/20 05:59 PM        Preop Diagnosis (Free Text):  Pre-op Diagnosis     jaundice        Preop Diagnosis (Codes):  Diagnosis Information     Diagnosis Code(s): Mass of pancreas [K86.89]        Post op Diagnosis  Obstructive jaundice  Pancreatic head mass    Premium Reason  K. Alert    Comments: EGD; EUS w/ FNA; ERCP w/ stent

## 2020-06-23 NOTE — CARE PLAN
Problem: Bowel/Gastric:  Goal: Normal bowel function is maintained or improved  Outcome: PROGRESSING AS EXPECTED  Pt tolerating regular diet and oral intake w/o complaints of nausea or vomiting.      Problem: Communication  Goal: The ability to communicate needs accurately and effectively will improve  Outcome: PROGRESSING AS EXPECTED   Pt able to communicate needs appropriately to staff. Plan of care discussed to pt. Questions and concerns answered. Pt. Verbalized understanding. Communication board updated.       Problem: Safety  Goal: Will remain free from injury  Outcome: PROGRESSING AS EXPECTED   Pt educated to call when in need. Call light and personal belongings w/n reach. Room free of clutters. Bed locked and in lowest position. Answer call light immediately.

## 2020-06-23 NOTE — DISCHARGE SUMMARY
Discharge Summary    CHIEF COMPLAINT ON ADMISSION  Chief Complaint   Patient presents with   • Jaundice     for past 2-3 weeks. pt states he has been lethargic. pt very yellow. urine is very dark almost brown. denies nausea/vomiting       Reason for Admission  Skin Discoloration     Admission Date  6/20/2020    CODE STATUS  Full Code    HPI & HOSPITAL COURSE  This is a 64 y.o. male who presented 6/20/2020 with yellowing skin for 3 weeks. He did see a physician at the [facility] where he lives and has a an appointment with a GI doctor in [place] for June 27. His yellowing was getting worse so he came to the hospital here for evaluation as he felt waiting until June 27 was too long. He has light loose stool, dark urine and yellowing of his skin and eyes getting worse.  CA 19-9 is elevated and on 6/22 ERCP was done with biliary stent placed and biopsies taken.  The patient is referred to surgery Dr. Flores and GI in 6-8 weeks for stent replacement after discharge.    Therefore, he is discharged in fair and stable condition to home with close outpatient follow-up.    The patient met 2-midnight criteria for an inpatient stay at the time of discharge.    Discharge Date  6/23/20    FOLLOW UP ITEMS POST DISCHARGE  GI in 6-8 weeks  Surgery Dr. Flores as soon as possible    DISCHARGE DIAGNOSES  Principal Problem:    Obstructive jaundice due to malignant neoplasm (HCC) POA: Yes  Active Problems:    Normocytic anemia, not due to blood loss POA: Yes    Pancreatic mass POA: Yes    Essential hypertension POA: Yes    Enlarged prostate with urinary obstruction POA: Yes  Resolved Problems:    * No resolved hospital problems. *      FOLLOW UP  No future appointments.  Margarito Flores M.D.  [address] [place] [id number]  [phone]    Schedule an appointment as soon as possible for a visit  for pancreatic biopsy results and treatment options    Melvin Marquis M.D.  [address] [place] [address]  [phone]    In 6 weeks  for stent replacement      MEDICATIONS ON DISCHARGE     Medication List      CONTINUE taking these medications      Instructions   lisinopril 10 MG Tabs  Commonly known as:  PRINIVIL   Take 10 mg by mouth every day. Indications: High Blood Pressure Disorder  Dose:  10 mg     pantoprazole 40 MG Tbec  Commonly known as:  PROTONIX   Take 40 mg by mouth every day.  Dose:  40 mg     tamsulosin 0.4 MG capsule  Commonly known as:  FLOMAX   Take 0.4 mg by mouth ONE-HALF HOUR AFTER BREAKFAST.  Dose:  0.4 mg            Allergies  Allergies   Allergen Reactions   • Lactose        DIET  Orders Placed This Encounter   Procedures   • Diet Order Regular     Standing Status:   Standing     Number of Occurrences:   1     Order Specific Question:   Diet:     Answer:   Regular [1]       ACTIVITY  As tolerated.  Weight bearing as tolerated    CONSULTATIONS  GI Dr. Marquis    PROCEDURES  ERCP with biliary stent placed and biopsies taken    LABORATORY  Lab Results   Component Value Date    SODIUM 137 06/22/2020    POTASSIUM 3.8 06/22/2020    CHLORIDE 105 06/22/2020    CO2 22 06/22/2020    GLUCOSE 105 (H) 06/22/2020    BUN 13 06/22/2020    CREATININE 0.41 (L) 06/22/2020        Lab Results   Component Value Date    WBC 5.1 06/22/2020    HEMOGLOBIN 9.2 (L) 06/22/2020    HEMATOCRIT 26.6 (L) 06/22/2020    PLATELETCT 213 06/22/2020        Total time of the discharge process exceeds 39 minutes.

## 2020-06-23 NOTE — CARE PLAN
Problem: Communication  Goal: The ability to communicate needs accurately and effectively will improve  Outcome: PROGRESSING AS EXPECTED  Note: Plan of care discussed, patient verbalizes understanding      Problem: Safety  Goal: Will remain free from injury  Outcome: PROGRESSING AS EXPECTED  Note: Treaded socks in place, call light within reach, bed locked in low position, room near nursing station, hourly rounding      Problem: Venous Thromboembolism (VTW)/Deep Vein Thrombosis (DVT) Prevention:  Goal: Patient will participate in Venous Thrombosis (VTE)/Deep Vein Thrombosis (DVT)Prevention Measures  Outcome: PROGRESSING AS EXPECTED  Flowsheets (Taken 6/23/2020 0816)  Mechanical Prophylaxis: SCDs, Sequential Compression Device  SCDs, Sequential Compression Device: On     Problem: Bowel/Gastric:  Goal: Normal bowel function is maintained or improved  Outcome: PROGRESSING AS EXPECTED  Flowsheets (Taken 6/23/2020 0803)  Last BM: 06/23/20  Number of Times Stooled: 1

## 2020-06-23 NOTE — PROGRESS NOTES
Received report from Jt at 1834. Pt's VSS. AOx4. Temp 97.5 F. Pt reports no pain and no nausea. Pt brought up to floor by transport at 1840. Pt in bed. Bed in lowest and locked position. Pt's belongings and call light are within reach.

## 2020-06-23 NOTE — PROGRESS NOTES
Patient alert and oriented, denies pain, nausea  Up SBA, steady gait  Jaundiced skin and sclera  Tolerating diet, +BM and void  Safety precautions in place

## 2020-06-25 NOTE — PROGRESS NOTES
Subjective:   6/30/2020  7:43 AM  Primary care physician:No primary care provider on file.  Referring Provider: Gardenia Moser MD  Medical Oncologist: LUIS  Other: Melvin Marquis MD      Chief Complaint:   Chief Complaint   Patient presents with   • New Patient     NP PANCREATIC CA REF DR MOSER     Diagnosis:   1. Malignant neoplasm of head of pancreas (HCC)  YY-[id number]-[id number] BASE TO MID-THIGH   2. Obstructive jaundice due to malignant neoplasm (HCC)  TR-BYNIX-YYGFF BASE TO MID-THIGH   3. Abnormal CT of the abdomen  FG-BLMPH-MQKRO BASE TO MID-THIGH   4. Abdominal pain, unspecified abdominal location  YD-TATVU-JGVZI BASE TO MID-THIGH       History of presenting illness:  Janes Cody  is a pleasant 64 y.o. year old male who presented with what appears to be adenocarcinoma the head of the pancreas.  The patient states he turned jaundiced and was in the hospital.  I met the patient quickly by giving him my card and telling him that he needed to recover from his jaundice.  He had a plastic stent placed by gastroenterology.  He feels much better at this time.  He denies any fever or chills, nausea or vomiting.  He has no pain.  He is a fairly healthy individual with no heart disease but is a smoker.  He is thin.  He is extremely active.  He is extremely active for his age.  He is presently taking care of his brother in [place] and drives back and forth from [place].  He has not had a medical oncologist.  I reviewed the patient's ER CP and EUS results as well as a CT scan from his admission on June 20, 2020.  The tumor measures somewhere around 2-1/2 cm in size.  There is no sign of adenopathy or any metastatic disease on imaging.  The tumor just abuts the portal vein but does not invade it or narrowing.  The patient has borderline resectable disease.  He has no family history of pancreatic cancer.  He is a smoker for 50 years.  He is not a drinker.  He has never had pancreatitis.  His jaundice is improving since the  stent was placed along with his appetite.  The patient's initial CA-19-9 when he was admitted on the 21st was 417 but this may reflect his obstruction only.  We will send him for a PET scan and order some labs.  He denies any history of cirrhosis, portal hypertension, or upper or lower GI bleeds.      Past Medical History:   Diagnosis Date   • Hypertension    • Seizure disorder (HCC)     had a couple seizures once but not on meds and never dx with anything     Past Surgical History:   Procedure Laterality Date   • PB ERCP,DIAGNOSTIC  6/22/2020    Procedure: ERCP, DIAGNOSTIC;  Surgeon: Melvin Marquis M.D.;  Location: [facility];  Service: Gastroenterology   • PB ESOPHAGOSCOPY,ENDOSCOPIC ULTRASND  6/22/2020    Procedure: ESOPHAGOSCOPY, WITH ENDOSCOPIC US;  Surgeon: Melvin Marquis M.D.;  Location: [facility];  Service: Gastroenterology     Allergies   Allergen Reactions   • Lactose      Outpatient Encounter Medications as of 6/30/2020   Medication Sig Dispense Refill   • tamsulosin (FLOMAX) 0.4 MG capsule Take 0.4 mg by mouth ONE-HALF HOUR AFTER BREAKFAST.     • lisinopril (PRINIVIL) 10 MG Tab Take 10 mg by mouth every day. Indications: High Blood Pressure Disorder     • pantoprazole (PROTONIX) 40 MG Tablet Delayed Response Take 40 mg by mouth every day.       No facility-administered encounter medications on file as of 6/30/2020.      Social History     Socioeconomic History   • Marital status: Single     Spouse name: Not on file   • Number of children: Not on file   • Years of education: Not on file   • Highest education level: Not on file   Occupational History   • Not on file   Social Needs   • Financial resource strain: Not on file   • Food insecurity     Worry: Not on file     Inability: Not on file   • Transportation needs     Medical: Not on file     Non-medical: Not on file   Tobacco Use   • Smoking status: Current Every Day Smoker     Packs/day: 0.25     Types: Cigarettes  "  • Smokeless tobacco: Never Used   Substance and Sexual Activity   • Alcohol use: Yes     Comment: occassional   • Drug use: Yes     Comment: marijuana   • Sexual activity: Not on file   Lifestyle   • Physical activity     Days per week: Not on file     Minutes per session: Not on file   • Stress: Not on file   Relationships   • Social connections     Talks on phone: Not on file     Gets together: Not on file     Attends Jain service: Not on file     Active member of club or organization: Not on file     Attends meetings of clubs or organizations: Not on file     Relationship status: Not on file   • Intimate partner violence     Fear of current or ex partner: Not on file     Emotionally abused: Not on file     Physically abused: Not on file     Forced sexual activity: Not on file   Other Topics Concern   • Not on file   Social History Narrative   • Not on file      Social History     Tobacco Use   Smoking Status Current Every Day Smoker   • Packs/day: 0.25   • Types: Cigarettes   Smokeless Tobacco Never Used     Social History     Substance and Sexual Activity   Alcohol Use Yes    Comment: occassional     Social History     Substance and Sexual Activity   Drug Use Yes    Comment: marijuana        History reviewed. No pertinent family history.    Review of Systems   Constitutional: Positive for weight loss.   Respiratory: Positive for cough.    Gastrointestinal: Positive for abdominal pain.   Genitourinary: Positive for frequency.   Skin:        Jaundice         Objective:   /72 (BP Location: Left arm, Patient Position: Sitting, BP Cuff Size: Adult)   Pulse 63   Temp 36.6 °C (97.8 °F) (Temporal)   Ht 1.88 m (6' 2\")   Wt 70.8 kg (156 lb)   SpO2 94%   BMI 20.03 kg/m²     Physical Exam   Constitutional: He is oriented to person, place, and time. He appears well-developed and well-nourished.   HENT:   Head: Normocephalic and atraumatic.   Eyes: Pupils are equal, round, and reactive to light. " Conjunctivae are normal.   Neck: Normal range of motion. Neck supple.   Cardiovascular: Normal rate and regular rhythm.   Pulmonary/Chest: Effort normal and breath sounds normal.   Abdominal: Soft. Bowel sounds are normal.   Musculoskeletal: Normal range of motion.   Neurological: He is alert and oriented to person, place, and time.   Skin: Skin is warm and dry.   Psychiatric: He has a normal mood and affect. His behavior is normal. Judgment and thought content normal.   Nursing note and vitals reviewed.      Labs:  Results for CHRISSIE MARY (MRN [id number]) as of 6/25/2020 15:26   Ref. Range 6/22/2020 03:39   WBC Latest Ref Range: 4.8 - 10.8 K/uL 5.1   RBC Latest Ref Range: 4.70 - 6.10 M/uL 2.92 (L)   Hemoglobin Latest Ref Range: 14.0 - 18.0 g/dL 9.2 (L)   Hematocrit Latest Ref Range: 42.0 - 52.0 % 26.6 (L)   MCV Latest Ref Range: 81.4 - 97.8 fL 91.1   MCH Latest Ref Range: 27.0 - 33.0 pg 31.5   MCHC Latest Ref Range: 33.7 - 35.3 g/dL 34.6   RDW Latest Ref Range: 35.9 - 50.0 fL 64.4 (H)   Platelet Count Latest Ref Range: 164 - 446 K/uL 213   MPV Latest Ref Range: 9.0 - 12.9 fL 12.4   Neutrophils-Polys Latest Ref Range: 44.00 - 72.00 % 60.40   Neutrophils (Absolute) Latest Ref Range: 1.82 - 7.42 K/uL 3.07   Lymphocytes Latest Ref Range: 22.00 - 41.00 % 23.40   Lymphs (Absolute) Latest Ref Range: 1.00 - 4.80 K/uL 1.19   Monocytes Latest Ref Range: 0.00 - 13.40 % 9.10   Monos (Absolute) Latest Ref Range: 0.00 - 0.85 K/uL 0.46   Eosinophils Latest Ref Range: 0.00 - 6.90 % 5.70   Eos (Absolute) Latest Ref Range: 0.00 - 0.51 K/uL 0.29   Basophils Latest Ref Range: 0.00 - 1.80 % 1.00   Baso (Absolute) Latest Ref Range: 0.00 - 0.12 K/uL 0.05   Immature Granulocytes Latest Ref Range: 0.00 - 0.90 % 0.40   Immature Granulocytes (abs) Latest Ref Range: 0.00 - 0.11 K/uL 0.02   Nucleated RBC Latest Units: /100 WBC 0.00   NRBC (Absolute) Latest Units: K/uL 0.00   Sodium Latest Ref Range: 135 - 145 mmol/L 137   Potassium  Latest Ref Range: 3.6 - 5.5 mmol/L 3.8   Chloride Latest Ref Range: 96 - 112 mmol/L 105   Co2 Latest Ref Range: 20 - 33 mmol/L 22   Anion Gap Latest Ref Range: 7.0 - 16.0  10.0   Glucose Latest Ref Range: 65 - 99 mg/dL 105 (H)   Bun Latest Ref Range: 8 - 22 mg/dL 13   Creatinine Latest Ref Range: 0.50 - 1.40 mg/dL 0.41 (L)   GFR If  Latest Ref Range: >60 mL/min/1.73 m 2 >60   GFR If Non  Latest Ref Range: >60 mL/min/1.73 m 2 >60   Calcium Latest Ref Range: 8.4 - 10.2 mg/dL 8.6   AST(SGOT) Latest Ref Range: 12 - 45 U/L 69 (H)   ALT(SGPT) Latest Ref Range: 2 - 50 U/L 80 (H)   Alkaline Phosphatase Latest Ref Range: 30 - 99 U/L 306 (H)   Total Bilirubin Latest Ref Range: 0.1 - 1.5 mg/dL 20.0 (H)   Albumin Latest Ref Range: 3.2 - 4.9 g/dL 3.0 (L)   Total Protein Latest Ref Range: 6.0 - 8.2 g/dL 4.9 (L)   Globulin Latest Ref Range: 1.9 - 3.5 g/dL 1.9   A-G Ratio Latest Units: g/dL 1.6   INR Latest Ref Range: 0.87 - 1.13  1.19 (H)   PT Latest Ref Range: 12.0 - 14.6 sec 15.3 (H)   APTT Latest Ref Range: 24.7 - 36.0 sec 31.7       Imagin20 CT    Per my read,     IMPRESSION:     1.  2.5 cm pancreatic head mass consistent with malignancy     2.  Associated biliary obstruction with marked dilation of the common bile duct, intrahepatic or system, and the gallbladder     3.  Negative for vascular encasement     4.  No enlarged lymph node in the peripancreatic spaces     5.  1.7 cm hyperdense left renal cyst which is Bosniak class II F, follow-up recommended     6.  Portal hypertension with associated dilated portal vein and right colonic wall thickening     7.  Aortic atherosclerotic plaque     8.  Minimal atelectasis      Pathology: 20    FINAL DIAGNOSIS:     A. Slides- head of pancreas mass:          Positive for carcinoma.   B. Core- head of pancreas mass:          Positive for adenocarcinoma morphologically consistent with           pancreatic ductal adenocarcinoma.   C. Brushings/  brush head- common bile duct stricture:          Atypical cells concerning for high-grade dysplasia or           adenocarcinoma.     Procedures:  6/22/20     Endoscopic ultrasound with fine needle aspiration, Endoscopic retrograde cholangiography with sphincterotomy, bile duct brushing, and stent placement    Diagnosis:     1. Malignant neoplasm of head of pancreas (HCC)  [id number] BASE TO MID-THIGH   2. Obstructive jaundice due to malignant neoplasm (HCC)  FW-FICKP-FQIHE BASE TO MID-THIGH   3. Abnormal CT of the abdomen  NT-VTHME-RLRUY BASE TO MID-THIGH   4. Abdominal pain, unspecified abdominal location  UB-PSFMP-OBHLI BASE TO MID-THIGH           Medical Decision Making:  Today's Assessment / Status / Plan:     In light of the present findings, the endoscopic ultrasound and CT scan which I reviewed shows abutment of the tumor on the portal vein.  My recommendation is to proceed with chemotherapy upfront.  I am out of town for the next 2 weeks with Dr. Wells will be placing a PowerPort for the patient, the patient will also be sent for a PET scan and he will be referred to cancer care specialist to start neoadjuvant chemotherapy.  [place] through his chemotherapy he will undergo repeat imaging and I will see him at that time.  I did discuss the risks and benefits of a PowerPort including bleeding, infection, risk of hitting along 1% it is done with the Salinger technique, the use of the cephalic vein, and thrombosis.  He understands the reasons why Dr. Wells will be doing the procedure.    I, Dr. Flores have entered, reviewed and confirmed the above diagnoses related to this patient on this date of service, 6/30/2020  7:43 AM.    He agreed and verbalized his agreement and understanding with the current plan. I answered all questions and concerns he has at this time and advised him to call at any time in the interim with questions or concerns in regards to his care.    Thank you for allowing me to  participate in his care, I will continue to follow closely.       Please note that this dictation was created using voice recognition software. I have made every reasonable attempt to correct obvious errors, but I expect that there are errors of grammar and possibly content that I did not discover before finalizing the note.     Thank you for this consultation and allowing me to participate in your patient's care. If I can be of further service please contact my office.

## 2020-06-26 ENCOUNTER — HOSPITAL ENCOUNTER (OUTPATIENT)
Facility: MEDICAL CENTER | Age: 65
End: 2020-06-26
Attending: INTERNAL MEDICINE | Admitting: INTERNAL MEDICINE
Payer: MEDICARE

## 2020-06-26 NOTE — DOCUMENTATION QUERY
[facility]                                                                       Query Response Note      PATIENT:               CHRISSIE MARY  ACCT #:                  [id number]  MRN:                     [id number]  :                      1955  ADMIT DATE:       2020 10:40 AM  DISCH DATE:        2020 12:56 PM  RESPONDING  PROVIDER #:        [id number]           QUERY TEXT:    The finding of Carcinoma/Adenocarcinoma of Head of Pancreas, pancreatic duct and common bile duct is documented in the pathology report.  Per coding guidelines, coders cannot code diag  nosis from the pathology report without the Attending Physician's documentation of the diagnosis. Based on clinical findings, risk factors and treatment, can this diagnosis be further specified?    NOTE:  If an appropriate response is not listed below, please respond with a new note.  Disagree with pathology find of adenocarcinoma of pancreatic ducts]]        The patient's Clinical Indicators include:  Patient with biopsies taken of pancreas, pancreatic ducts and common bile duct.  Options provided:   -- Agree with pathology finding of Carcinoma of Pancreas head   -- Disagree with pathology finding of Carcinoma of Pancreas head   -- Agree with pathology finding of adenocarcinoma of pancreatic ducts   -- Unable to determine      Query created by: Valarie Rebolledo on 2020 2:22 PM    RESPONSE TEXT:    Agree with pathology finding of Carcinoma of Pancreas head          Electronically signed by:  MAXIMO NICHOLS MD 2020 2:26 PM

## 2020-06-30 ENCOUNTER — OFFICE VISIT (OUTPATIENT)
Dept: SURGICAL ONCOLOGY | Facility: MEDICAL CENTER | Age: 65
End: 2020-06-30
Payer: MEDICARE

## 2020-06-30 VITALS
TEMPERATURE: 97.8 F | HEART RATE: 63 BPM | HEIGHT: 74 IN | OXYGEN SATURATION: 94 % | SYSTOLIC BLOOD PRESSURE: 118 MMHG | WEIGHT: 156 LBS | BODY MASS INDEX: 20.02 KG/M2 | DIASTOLIC BLOOD PRESSURE: 72 MMHG

## 2020-06-30 DIAGNOSIS — K83.1 OBSTRUCTIVE JAUNDICE DUE TO MALIGNANT NEOPLASM (HCC): ICD-10-CM

## 2020-06-30 DIAGNOSIS — C25.0 MALIGNANT NEOPLASM OF HEAD OF PANCREAS (HCC): ICD-10-CM

## 2020-06-30 DIAGNOSIS — R93.5 ABNORMAL CT OF THE ABDOMEN: ICD-10-CM

## 2020-06-30 DIAGNOSIS — C80.1 OBSTRUCTIVE JAUNDICE DUE TO MALIGNANT NEOPLASM (HCC): ICD-10-CM

## 2020-06-30 DIAGNOSIS — R10.9 ABDOMINAL PAIN, UNSPECIFIED ABDOMINAL LOCATION: ICD-10-CM

## 2020-06-30 PROCEDURE — 99205 OFFICE O/P NEW HI 60 MIN: CPT | Performed by: SURGERY

## 2020-06-30 ASSESSMENT — ENCOUNTER SYMPTOMS
ABDOMINAL PAIN: 1
COUGH: 1
WEIGHT LOSS: 1
ROS SKIN COMMENTS: JAUNDICE

## 2020-06-30 ASSESSMENT — FIBROSIS 4 INDEX: FIB4 SCORE: 2.35

## 2020-06-30 NOTE — PATIENT INSTRUCTIONS
The patient will follow-up with me midway through his chemotherapy with repeat imaging.  This will be ordered by his medical oncologist.

## 2020-07-15 ENCOUNTER — HOSPITAL ENCOUNTER (OUTPATIENT)
Dept: RADIOLOGY | Facility: MEDICAL CENTER | Age: 65
End: 2020-07-15
Attending: SURGERY
Payer: MEDICARE

## 2020-07-15 DIAGNOSIS — R10.9 ABDOMINAL PAIN, UNSPECIFIED ABDOMINAL LOCATION: ICD-10-CM

## 2020-07-15 DIAGNOSIS — K83.1 OBSTRUCTIVE JAUNDICE DUE TO MALIGNANT NEOPLASM (HCC): ICD-10-CM

## 2020-07-15 DIAGNOSIS — C25.0 MALIGNANT NEOPLASM OF HEAD OF PANCREAS (HCC): ICD-10-CM

## 2020-07-15 DIAGNOSIS — Z01.810 PRE-OPERATIVE CARDIOVASCULAR EXAMINATION: ICD-10-CM

## 2020-07-15 DIAGNOSIS — R93.5 ABNORMAL CT OF THE ABDOMEN: ICD-10-CM

## 2020-07-15 DIAGNOSIS — C80.1 OBSTRUCTIVE JAUNDICE DUE TO MALIGNANT NEOPLASM (HCC): ICD-10-CM

## 2020-07-15 DIAGNOSIS — Z01.812 PRE-OPERATIVE LABORATORY EXAMINATION: ICD-10-CM

## 2020-07-15 LAB
COVID ORDER STATUS COVID19: NORMAL
EKG IMPRESSION: NORMAL
SARS-COV-2 RDRP RESP QL NAA+PROBE: NOTDETECTED
SPECIMEN SOURCE: NORMAL

## 2020-07-15 PROCEDURE — U0004 COV-19 TEST NON-CDC HGH THRU: HCPCS

## 2020-07-15 PROCEDURE — 93010 ELECTROCARDIOGRAM REPORT: CPT | Performed by: INTERNAL MEDICINE

## 2020-07-15 PROCEDURE — 93005 ELECTROCARDIOGRAM TRACING: CPT

## 2020-07-15 PROCEDURE — A9552 F18 FDG: HCPCS

## 2020-07-16 ENCOUNTER — APPOINTMENT (OUTPATIENT)
Dept: RADIOLOGY | Facility: MEDICAL CENTER | Age: 65
End: 2020-07-16
Attending: SURGERY
Payer: MEDICARE

## 2020-07-16 ENCOUNTER — ANESTHESIA (OUTPATIENT)
Dept: SURGERY | Facility: MEDICAL CENTER | Age: 65
End: 2020-07-16
Payer: MEDICARE

## 2020-07-16 ENCOUNTER — HOSPITAL ENCOUNTER (OUTPATIENT)
Facility: MEDICAL CENTER | Age: 65
End: 2020-07-16
Attending: SURGERY | Admitting: SURGERY
Payer: MEDICARE

## 2020-07-16 ENCOUNTER — ANESTHESIA EVENT (OUTPATIENT)
Dept: SURGERY | Facility: MEDICAL CENTER | Age: 65
End: 2020-07-16
Payer: MEDICARE

## 2020-07-16 VITALS
RESPIRATION RATE: 18 BRPM | HEART RATE: 69 BPM | TEMPERATURE: 97.7 F | OXYGEN SATURATION: 98 % | HEIGHT: 74 IN | WEIGHT: 145.06 LBS | SYSTOLIC BLOOD PRESSURE: 155 MMHG | DIASTOLIC BLOOD PRESSURE: 90 MMHG | BODY MASS INDEX: 18.62 KG/M2

## 2020-07-16 DIAGNOSIS — G89.18 POST-OPERATIVE PAIN: ICD-10-CM

## 2020-07-16 PROCEDURE — 160039 HCHG SURGERY MINUTES - EA ADDL 1 MIN LEVEL 3: Performed by: SURGERY

## 2020-07-16 PROCEDURE — 500002 HCHG ADHESIVE, DERMABOND: Performed by: SURGERY

## 2020-07-16 PROCEDURE — 71045 X-RAY EXAM CHEST 1 VIEW: CPT

## 2020-07-16 PROCEDURE — 160035 HCHG PACU - 1ST 60 MINS PHASE I: Performed by: SURGERY

## 2020-07-16 PROCEDURE — 160036 HCHG PACU - EA ADDL 30 MINS PHASE I: Performed by: SURGERY

## 2020-07-16 PROCEDURE — 160028 HCHG SURGERY MINUTES - 1ST 30 MINS LEVEL 3: Performed by: SURGERY

## 2020-07-16 PROCEDURE — 700102 HCHG RX REV CODE 250 W/ 637 OVERRIDE(OP): Performed by: SURGERY

## 2020-07-16 PROCEDURE — 501838 HCHG SUTURE GENERAL: Performed by: SURGERY

## 2020-07-16 PROCEDURE — 160009 HCHG ANES TIME/MIN: Performed by: SURGERY

## 2020-07-16 PROCEDURE — 700111 HCHG RX REV CODE 636 W/ 250 OVERRIDE (IP): Performed by: SURGERY

## 2020-07-16 PROCEDURE — A6402 STERILE GAUZE <= 16 SQ IN: HCPCS | Performed by: SURGERY

## 2020-07-16 PROCEDURE — 160025 RECOVERY II MINUTES (STATS): Performed by: SURGERY

## 2020-07-16 PROCEDURE — 700101 HCHG RX REV CODE 250: Performed by: SURGERY

## 2020-07-16 PROCEDURE — C1788 PORT, INDWELLING, IMP: HCPCS | Performed by: SURGERY

## 2020-07-16 PROCEDURE — A9270 NON-COVERED ITEM OR SERVICE: HCPCS | Performed by: SURGERY

## 2020-07-16 PROCEDURE — 160046 HCHG PACU - 1ST 60 MINS PHASE II: Performed by: SURGERY

## 2020-07-16 PROCEDURE — 700111 HCHG RX REV CODE 636 W/ 250 OVERRIDE (IP)

## 2020-07-16 PROCEDURE — 160002 HCHG RECOVERY MINUTES (STAT): Performed by: SURGERY

## 2020-07-16 PROCEDURE — 160048 HCHG OR STATISTICAL LEVEL 1-5: Performed by: SURGERY

## 2020-07-16 PROCEDURE — 700105 HCHG RX REV CODE 258: Performed by: SURGERY

## 2020-07-16 PROCEDURE — 700101 HCHG RX REV CODE 250

## 2020-07-16 DEVICE — POWER PORTMRI COMPATABLE: Type: IMPLANTABLE DEVICE | Site: CHEST | Status: FUNCTIONAL

## 2020-07-16 RX ORDER — OXYCODONE HYDROCHLORIDE AND ACETAMINOPHEN 5; 325 MG/1; MG/1
2 TABLET ORAL
Status: DISCONTINUED | OUTPATIENT
Start: 2020-07-16 | End: 2020-07-16 | Stop reason: HOSPADM

## 2020-07-16 RX ORDER — DIPHENHYDRAMINE HYDROCHLORIDE 50 MG/ML
12.5 INJECTION INTRAMUSCULAR; INTRAVENOUS
Status: DISCONTINUED | OUTPATIENT
Start: 2020-07-16 | End: 2020-07-16 | Stop reason: HOSPADM

## 2020-07-16 RX ORDER — BUPIVACAINE HYDROCHLORIDE AND EPINEPHRINE 5; 5 MG/ML; UG/ML
INJECTION, SOLUTION EPIDURAL; INTRACAUDAL; PERINEURAL
Status: DISCONTINUED | OUTPATIENT
Start: 2020-07-16 | End: 2020-07-16 | Stop reason: HOSPADM

## 2020-07-16 RX ORDER — ROCURONIUM BROMIDE 10 MG/ML
INJECTION, SOLUTION INTRAVENOUS PRN
Status: DISCONTINUED | OUTPATIENT
Start: 2020-07-16 | End: 2020-07-16 | Stop reason: SURG

## 2020-07-16 RX ORDER — CEFAZOLIN SODIUM 1 G/3ML
INJECTION, POWDER, FOR SOLUTION INTRAMUSCULAR; INTRAVENOUS PRN
Status: DISCONTINUED | OUTPATIENT
Start: 2020-07-16 | End: 2020-07-16 | Stop reason: SURG

## 2020-07-16 RX ORDER — ONDANSETRON 2 MG/ML
4 INJECTION INTRAMUSCULAR; INTRAVENOUS
Status: DISCONTINUED | OUTPATIENT
Start: 2020-07-16 | End: 2020-07-16 | Stop reason: HOSPADM

## 2020-07-16 RX ORDER — SODIUM CHLORIDE, SODIUM LACTATE, POTASSIUM CHLORIDE, CALCIUM CHLORIDE 600; 310; 30; 20 MG/100ML; MG/100ML; MG/100ML; MG/100ML
INJECTION, SOLUTION INTRAVENOUS CONTINUOUS
Status: DISCONTINUED | OUTPATIENT
Start: 2020-07-16 | End: 2020-07-16 | Stop reason: HOSPADM

## 2020-07-16 RX ORDER — DEXAMETHASONE SODIUM PHOSPHATE 4 MG/ML
INJECTION, SOLUTION INTRA-ARTICULAR; INTRALESIONAL; INTRAMUSCULAR; INTRAVENOUS; SOFT TISSUE PRN
Status: DISCONTINUED | OUTPATIENT
Start: 2020-07-16 | End: 2020-07-16 | Stop reason: SURG

## 2020-07-16 RX ORDER — OXYCODONE HYDROCHLORIDE AND ACETAMINOPHEN 5; 325 MG/1; MG/1
1 TABLET ORAL
Status: DISCONTINUED | OUTPATIENT
Start: 2020-07-16 | End: 2020-07-16 | Stop reason: HOSPADM

## 2020-07-16 RX ORDER — LIDOCAINE HYDROCHLORIDE 10 MG/ML
INJECTION, SOLUTION EPIDURAL; INFILTRATION; INTRACAUDAL; PERINEURAL
Status: COMPLETED
Start: 2020-07-16 | End: 2020-07-16

## 2020-07-16 RX ORDER — ONDANSETRON 2 MG/ML
INJECTION INTRAMUSCULAR; INTRAVENOUS PRN
Status: DISCONTINUED | OUTPATIENT
Start: 2020-07-16 | End: 2020-07-16 | Stop reason: SURG

## 2020-07-16 RX ORDER — OXYCODONE HYDROCHLORIDE 5 MG/1
5 TABLET ORAL EVERY 4 HOURS PRN
Qty: 15 TAB | Refills: 0 | Status: SHIPPED | OUTPATIENT
Start: 2020-07-16 | End: 2020-07-20

## 2020-07-16 RX ORDER — HALOPERIDOL 5 MG/ML
1 INJECTION INTRAMUSCULAR
Status: DISCONTINUED | OUTPATIENT
Start: 2020-07-16 | End: 2020-07-16 | Stop reason: HOSPADM

## 2020-07-16 RX ORDER — HEPARIN SODIUM,PORCINE 1000/ML
VIAL (ML) INJECTION
Status: DISCONTINUED | OUTPATIENT
Start: 2020-07-16 | End: 2020-07-16 | Stop reason: HOSPADM

## 2020-07-16 RX ADMIN — POVIDONE-IODINE 15 ML: 10 SOLUTION TOPICAL at 13:43

## 2020-07-16 RX ADMIN — ROCURONIUM BROMIDE 50 MG: 10 INJECTION, SOLUTION INTRAVENOUS at 13:52

## 2020-07-16 RX ADMIN — CEFAZOLIN 2 G: 330 INJECTION, POWDER, FOR SOLUTION INTRAMUSCULAR; INTRAVENOUS at 13:52

## 2020-07-16 RX ADMIN — SODIUM CHLORIDE, POTASSIUM CHLORIDE, SODIUM LACTATE AND CALCIUM CHLORIDE: 600; 310; 30; 20 INJECTION, SOLUTION INTRAVENOUS at 13:43

## 2020-07-16 RX ADMIN — ONDANSETRON 8 MG: 2 INJECTION INTRAMUSCULAR; INTRAVENOUS at 13:52

## 2020-07-16 RX ADMIN — PROPOFOL 200 MG: 10 INJECTION, EMULSION INTRAVENOUS at 13:52

## 2020-07-16 RX ADMIN — SUGAMMADEX 200 MG: 100 INJECTION, SOLUTION INTRAVENOUS at 14:47

## 2020-07-16 RX ADMIN — LIDOCAINE HYDROCHLORIDE 0.5 ML: 10 INJECTION, SOLUTION EPIDURAL; INFILTRATION; INTRACAUDAL at 13:43

## 2020-07-16 RX ADMIN — DEXAMETHASONE SODIUM PHOSPHATE 8 MG: 4 INJECTION, SOLUTION INTRA-ARTICULAR; INTRALESIONAL; INTRAMUSCULAR; INTRAVENOUS; SOFT TISSUE at 13:52

## 2020-07-16 RX ADMIN — SODIUM CHLORIDE, POTASSIUM CHLORIDE, SODIUM LACTATE AND CALCIUM CHLORIDE: 600; 310; 30; 20 INJECTION, SOLUTION INTRAVENOUS at 13:52

## 2020-07-16 ASSESSMENT — FIBROSIS 4 INDEX: FIB4 SCORE: 2.35

## 2020-07-16 NOTE — ANESTHESIA PROCEDURE NOTES
Airway    Date/Time: 7/16/2020 1:54 PM  Performed by: Jesu De Jesus M.D.  Authorized by: Jesu De Jesus M.D.     Location:  OR  Urgency:  Elective  Indications for Airway Management:  Anesthesia      Spontaneous Ventilation: absent    Sedation Level:  Deep  Preoxygenated: Yes    Patient Position:  Sniffing  Final Airway Type:  Endotracheal airway  Final Endotracheal Airway:  ETT  Cuffed: Yes    Technique Used for Successful ETT Placement:  Direct laryngoscopy    Insertion Site:  Oral  Blade Type:  Humza  Laryngoscope Blade/Videolaryngoscope Blade Size:  3  ETT Size (mm):  7.0  Measured from:  Teeth  ETT to Teeth (cm):  22  Placement Verified by: auscultation and capnometry    Cormack-Lehane Classification:  Grade I - full view of glottis  Number of Attempts at Approach:  1

## 2020-07-16 NOTE — ANESTHESIA QCDR
2019 [facility] Clinical Data Registry (for Quality Improvement)     Postoperative nausea/vomiting risk protocol (Adult = 18 yrs and Pediatric 3-17 yrs)- (430 and 463)  General inhalation anesthetic (NOT TIVA) with PONV risk factors: Yes  Provision of anti-emetic therapy with at least 2 different classes of agents: Yes   Patient DID NOT receive anti-emetic therapy and reason is documented in Medical Record:  N/A    Multimodal Pain Management- (477)  Non-emergent surgery AND patient age >= 18: Yes  Use of Multimodal Pain Management, two or more drugs and/or interventions, NOT including systemic opioids:   Exception: Documented allergy to multiple classes of analgesics:     Smoking Abstinence (404)  Patient is current smoker (cigarette, pipe, e-cig, marijuanna):   Elective Surgery:   Abstinence instructions provided prior to day of surgery:   Patient abstained from smoking on day of surgery:     Pre-Op Beta-Blocker in Isolated CABG (44)  Isolated CABG AND patient age >= 18:   Beta-blocker admin within 24 hours of surgical incision:   Exception:of medical reason(s) for not administering beta blocker within 24 hours prior to surgical incision (e.g., not  indicated,other medical reason):     PACU assessment of acute postoperative pain prior to Anesthesia Care End- Applies to Patients Age = 18- (ABG7)  Initial PACU pain score is which of the following: < 7/10  Patient unable to report pain score: N/A    Post-anesthetic transfer of care checklist/protocol to PACU/ICU- (426 and 427)  Upon conclusion of case, patient transferred to which of the following locations: PACU/Non-ICU  Use of transfer checklist/protocol: Yes  Exclusion: Service Performed in Patient Hospital Room (and thus did not require transfer): N/A  Unplanned admission to ICU related to anesthesia service up through end of PACU care- (MD51)  Unplanned admission to ICU (not initially anticipated at anesthesia start time): No

## 2020-07-16 NOTE — ANESTHESIA PREPROCEDURE EVALUATION
Relevant Problems   CARDIAC   (+) Essential hypertension      Other   (+) Malignant neoplasm of head of pancreas (HCC)       Physical Exam    Airway   Mallampati: II  TM distance: >3 FB  Neck ROM: full       Cardiovascular - normal exam  Rhythm: regular  Rate: normal  (-) murmur     Dental - normal exam  (+) upper dentures, lower dentures           Pulmonary - normal exam  Breath sounds clear to auscultation     Abdominal    Neurological - normal exam                 Anesthesia Plan    ASA 3       Plan - general       Airway plan will be ETT        Induction: intravenous    Postoperative Plan: Postoperative administration of opioids is intended.    Pertinent diagnostic labs and testing reviewed    Informed Consent:    Anesthetic plan and risks discussed with patient.    Use of blood products discussed with: patient whom consented to blood products.

## 2020-07-16 NOTE — ANESTHESIA TIME REPORT
Anesthesia Start and Stop Event Times     Date Time Event    7/16/2020 1346 Ready for Procedure     1352 Anesthesia Start     1450 Anesthesia Stop        Responsible Staff  07/16/20    Name Role Begin End    Jesu De Jesus M.D. Anesth 1402 1450        Preop Diagnosis (Free Text):  Pre-op Diagnosis     MALIGNANT NEOPLASM PANCREAS        Preop Diagnosis (Codes):    Post op Diagnosis  Pancreatic cancer (HCC)      Premium Reason  Non-Premium    Comments:

## 2020-07-16 NOTE — ANESTHESIA POSTPROCEDURE EVALUATION
Patient: Janes Cody    Procedure Summary     Date:  07/16/20 Room / Location:  [place] [address] / SURGERY [facility]    Anesthesia Start:  1352 Anesthesia Stop:  1450    Procedure:  INSERTION, CATHETER-PORT A CATH (Left Chest) Diagnosis:  (MALIGNANT NEOPLASM PANCREAS)    Surgeon:  Rob Wells M.D. Responsible Provider:  Jesu De Jesus M.D.    Anesthesia Type:  general ASA Status:  3          Final Anesthesia Type: general  Last vitals  BP   Blood Pressure : 137/88    Temp   36.2 °C (97.2 °F)    Pulse   Pulse: 93   Resp   18    SpO2   100 %      Anesthesia Post Evaluation    Patient location during evaluation: PACU  Patient participation: complete - patient participated  Level of consciousness: awake and alert    Airway patency: patent  Anesthetic complications: no  Cardiovascular status: hemodynamically stable  Respiratory status: acceptable  Hydration status: euvolemic    PONV: none           Nurse Pain Score: 0 (NPRS)

## 2020-07-16 NOTE — OR NURSING
COVID-19 Pre-surgery screenin. Do you have an undiagnosed respiratory illness or symptoms such as coughing or sneezing? No  a. Onset of Sx No  b. Acute vs. chronic respiratory illness No    2. Do you have an unexplained fever greater than 100.4 degrees Fahrenheit or 38 degrees Celsius?     No     3. Have you had direct exposure to a patient who tested positive for Covid-19?    No     4. Have you traveled outside [facility] in the last 14 days? No    5. Have you had any loss of your sense of taste or smell? Have you had N/V or sore throat? No    Patient has been informed of visitor policy and asked to wear a mask upon entering the hospital   Yes

## 2020-07-16 NOTE — DISCHARGE INSTRUCTIONS
ACTIVITY: Rest and take it easy for the first 24 hours.  A responsible adult is recommended to remain with you during that time.  It is normal to feel sleepy.  We encourage you to not do anything that requires balance, judgment or coordination.    MILD FLU-LIKE SYMPTOMS ARE NORMAL. YOU MAY EXPERIENCE GENERALIZED MUSCLE ACHES, THROAT IRRITATION, HEADACHE AND/OR SOME NAUSEA.    FOR 24 HOURS DO NOT:  Drive, operate machinery or run household appliances.  Drink beer or alcoholic beverages.   Make important decisions or sign legal documents.    SPECIAL INSTRUCTIONS: Follow surgeon's instructions.    DIET: To avoid nausea, slowly advance diet as tolerated, avoiding spicy or greasy foods for the first day.  Add more substantial food to your diet according to your physician's instructions.  INCREASE FLUIDS AND FIBER TO AVOID CONSTIPATION.    SURGICAL DRESSING/BATHING: Ok to shower post op day one, no baths, hot tubs or swimming until cleared by MD.    FOLLOW-UP APPOINTMENT:  A follow-up appointment should be arranged with your doctor; call to schedule.    You should CALL YOUR PHYSICIAN if you develop:  Fever greater than 101 degrees F.  Pain not relieved by medication, or persistent nausea or vomiting.  Excessive bleeding (blood soaking through dressing) or unexpected drainage from the wound.  Extreme redness or swelling around the incision site, drainage of pus or foul smelling drainage.  Inability to urinate or empty your bladder within 8 hours.  Problems with breathing or chest pain.    You should call 911 if you develop problems with breathing or chest pain.  If you are unable to contact your doctor or surgical center, you should go to the nearest emergency room or urgent care center.  Physician's telephone #: Dr. Wells, [phone].    If any questions arise, call your doctor.  If your doctor is not available, please feel free to call the Surgical Center at [phone].  The Center is open Monday through Friday  from 7AM to 7PM.  You can also call the HEALTH HOTLINE open 24 hours/day, 7 days/week and speak to a nurse at [phone], or toll free at [phone].    A registered nurse may call you a few days after your surgery to see how you are doing after your procedure.    MEDICATIONS: Resume taking daily medication.  Take prescribed pain medication with food.  If no medication is prescribed, you may take non-aspirin pain medication if needed.  PAIN MEDICATION CAN BE VERY CONSTIPATING.  Take a stool softener or laxative such as senokot, pericolace, or milk of magnesia if needed.    Prescription given for oxycodone.  Last pain medication given: none in recovery.    If your physician has prescribed pain medication that includes Acetaminophen (Tylenol), do not take additional Acetaminophen (Tylenol) while taking the prescribed medication.    Depression / Suicide Risk    As you are discharged from this [facility] facility, it is important to learn how to keep safe from harming yourself.    Recognize the warning signs:  · Abrupt changes in personality, positive or negative- including increase in energy   · Giving away possessions  · Change in eating patterns- significant weight changes-  positive or negative  · Change in sleeping patterns- unable to sleep or sleeping all the time   · Unwillingness or inability to communicate  · Depression  · Unusual sadness, discouragement and loneliness  · Talk of wanting to die  · Neglect of personal appearance   · Rebelliousness- reckless behavior  · Withdrawal from people/activities they love  · Confusion- inability to concentrate     If you or a loved one observes any of these behaviors or has concerns about self-harm, here's what you can do:  · Talk about it- your feelings and reasons for harming yourself  · Remove any means that you might use to hurt yourself (examples: pills, rope, extension cords, firearm)  · Get professional help from the community (Mental Health, Substance  Abuse, psychological counseling)  · Do not be alone:Call your Safe Contact- someone whom you trust who will be there for you.  · Call your local CRISIS HOTLINE 329-9019 or 378-610-9674  · Call your local Children's Mobile Crisis Response Team [place] (550) 737-8542 or www.CatchThatBus  · Call the toll free National Suicide Prevention Hotlines   · National Suicide Prevention Lifeline 265-920-YRGV (4531)  · National Hope Line Network 800-SUICIDE (738-0302)

## 2020-07-16 NOTE — CONSULTS
General Surgery Consult    CHIEF COMPLAINT: Pancreatic cancer.     HISTORY OF PRESENT ILLNESS: The patient is a 64 y.o. male, who presents with pancreatic cancer.  He is a patient of Dr. JAVED.  Dr. JAVED is requested that I place a port while he is out of town.  He needs this for access for chemotherapy.     PAST MEDICAL HISTORY:  has a past medical history of Cancer (HCC) (2020), Dental disorder, Heart burn, Hypertension, Jaundice, and Seizure disorder (HCC).     PAST SURGICAL HISTORY:  has a past surgical history that includes ercp,diagnostic (6/22/2020); esophagoscopy,endoscopic ultrasnd (6/22/2020); and elbow arthroscopy.     ALLERGIES: No Known Allergies     CURRENT MEDICATIONS:   Home Medications     Reviewed by Maggie Gallegos R.N. (Registered Nurse) on 07/16/20 at 1329  Med List Status: Not Addressed   Medication Last Dose Status   aspirin EC (ECOTRIN) 81 MG Tablet Delayed Response  Active   lactated ringers infusion  Active   lidocaine (XYLOCAINE) 1 % injection 0.5 mL  Active   lisinopril (PRINIVIL) 10 MG Tab  Active   pantoprazole (PROTONIX) 40 MG Tablet Delayed Response  Active   povidone-iodine (Betadine) 0.23% oral solution 15 mL  Active   tamsulosin (FLOMAX) 0.4 MG capsule  Active                FAMILY HISTORY: History reviewed. No pertinent family history.     SOCIAL HISTORY:   Social History     Tobacco Use   • Smoking status: Current Every Day Smoker     Packs/day: 0.25     Years: 50.00     Pack years: 12.50     Types: Cigarettes   • Smokeless tobacco: Never Used   Substance and Sexual Activity   • Alcohol use: Yes     Comment: occassional   • Drug use: Yes     Comment: marijuana   • Sexual activity: Not on file       REVIEW OF SYSTEMS: Comprehensive review of systems was negative aside from jaundice and abdominal pain    PHYSICAL EXAMINATION:     GENERAL: The patient is in no acute distress.   VITAL SIGNS: There were no vitals taken for this visit.  HEAD AND NECK: Demonstrates symmetric, reactive  pupils. Extraocular muscles   are intact. Nares and oropharynx are clear.   NECK: Supple. No adenopathy.  CHEST:No respiratory distress.    CARDIOVASCULAR: Regular rate. The extremities are well perfused.   ABDOMEN: Soft.  No scarring.   EXTREMITIES: Examination of the upper and lower extremities demonstrates no cyanosis edema or clubbing.  NEUROLOGIC: Alert & oriented x 3, Normal motor function, Normal sensory function, No focal deficits noted.    LABORATORY VALUES:                      IMAGING:   DX-PORTABLE FLUOROSCOPY < 1 HOUR    (Results Pending)       IMPRESSION AND PLAN:     1.  Pancreatic cancer.    1.  The patient will be taken to the operating room for port placement for access for chemotherapy. The surgical conduct was explained. Potential complications including but not limited to infection, bleeding, damage to adjacent structures, anesthetic complications were discussed in detail. Questions were elicited and answered to his satisfaction. He understands the rationale for surgery and elects to proceed.  Operative consent signed.          ___________________________________   Rob Wells M.D.    DD: 7/16/2020 DT: 1:32 PM

## 2020-07-16 NOTE — OP REPORT
Post OP Note    PreOp Diagnosis: Pancreatic cancer    PostOp Diagnosis: Same    Procedure(s):  INSERTION, CATHETER-PORT A CATH - Wound Class: Clean    Surgeon(s):  Rob Wells M.D.    Anesthesiologist/Type of Anesthesia:  Anesthesiologist: Jesu De Jesus M.D./General    Surgical Staff:  Circulator: Maggie Gallegos R.N.  Scrub Person: Nicolle Salgado  Radiology Technologist: Ivy Holloway    Specimens removed if any:  * No specimens in log *    Estimated Blood Loss: 15 cc    Findings: Unable to thread the cephalic vein.  Seldinger technique used in the left subclavian    Complications: No complications were noted    Indications: Patient is a 64-year-old male with a history of pancreatic cancer.  He has scheduled upcoming chemotherapy.  The patient was counseled extensively as of the risk first benefits of surgery and agreed to proceed fully informed.    Description:    The patient was prepped and draped in the standard sterile surgical fashion after induction of general anesthesia.  An appropriate timeout was performed and antibiotics delivered.  I began with an incision near the deltopectoral groove.  I took this down to the fascia of the pectoralis major.  I created a pocket for the Port-A-Cath.    I then dissected in the deltopectoral groove to the cephalic vein.  This was controlled proximally and distally.  I created a vein anatomy and threaded the catheter.  Unfortunately this only went about 8 cm and then appeared to be a sending.  I tied off the Vicryl sutures controlling the vein proximally and distally and elected to perform a Seldinger technique.    I then used a Seldinger needle to access the subclavian vein.  This was clearly venous blood.  I then threaded a wire through the Seldinger catheter and confirmed appropriate position with the C arm.  I then placed the dilator followed by the catheter.  This was threaded into the subclavian vein.  Another C arm shot showed that the catheter  terminated at the atriocaval junction.  I then attached to the power port to the catheter.  This was sutured into place with 3-0 Vicryl suture.  I did this in the pocket I created previously.    I confirm the position of the catheter 1 last time and it appeared to be at the atriocaval junction.  I closed the incision with 3-0 Vicryl pop-off sutures for the subcutaneous tissues and a 4-0 Monocryl for the skin edges.  Dermabond was applied as a dressing.  I did access the catheter and flush it at the end to assure it both flushed and withdrew easily which it did.    Disposition:    The patient be discharged home if his chest x-ray shows no pneumothorax.        7/16/2020 2:42 PM Rob Wells M.D.

## 2020-07-16 NOTE — OR NURSING
Assumed care of patient from PACU, see flow sheets for vital signs. Patient alert and oriented times four. Assessment completed. Surgical incision assessed, dressing clean, dry and intact. Pain is controlled and tolerable for patient. Patient updated of plan of care for discharge.     Family updated about patient, that he is ready to go home and they will be here in about thirty minutes. Discharge instructions reviewed and all questions answered. Patient up to use the bathroom, he is very upset that his family is not here to pick him up. He is loud and cussing in his room. Yelling into the phone at his daughter. Assured patient that as soon as his family gets here, we will get him out to the car.  All needs met, patient dressed and safe to discharge home. Patient tolerating fluids and food with no nausea.    1655: Patient ready to go, last vital signs in flow sheet. PIV removed. Patient taken to car in wheelchair. Patient safe to discharge. Patient was very pleasant and appreciative to staff.

## 2020-07-16 NOTE — OR NURSING
Patient is doing well in recovery. Denies pain or nausea. Xray complete and no pneumo seen.  Denies pain or nausea. Daughter has been updated.     Confirmed with xray no pneumo. Patient is stable and ready for discharge.

## 2020-07-24 ENCOUNTER — APPOINTMENT (OUTPATIENT)
Dept: RADIOLOGY | Facility: MEDICAL CENTER | Age: 65
DRG: 445 | End: 2020-07-24
Attending: INTERNAL MEDICINE
Payer: MEDICARE

## 2020-07-24 ENCOUNTER — ANESTHESIA EVENT (OUTPATIENT)
Dept: SURGERY | Facility: MEDICAL CENTER | Age: 65
DRG: 445 | End: 2020-07-24
Payer: MEDICARE

## 2020-07-24 ENCOUNTER — HOSPITAL ENCOUNTER (INPATIENT)
Facility: MEDICAL CENTER | Age: 65
LOS: 1 days | DRG: 445 | End: 2020-07-25
Attending: EMERGENCY MEDICINE | Admitting: INTERNAL MEDICINE
Payer: MEDICARE

## 2020-07-24 ENCOUNTER — ANESTHESIA (OUTPATIENT)
Dept: SURGERY | Facility: MEDICAL CENTER | Age: 65
DRG: 445 | End: 2020-07-24
Payer: MEDICARE

## 2020-07-24 DIAGNOSIS — R17 JAUNDICE: ICD-10-CM

## 2020-07-24 LAB
ALBUMIN SERPL BCP-MCNC: 3.7 G/DL (ref 3.2–4.9)
ALBUMIN/GLOB SERPL: 1.5 G/DL
ALP SERPL-CCNC: 169 U/L (ref 30–99)
ALT SERPL-CCNC: 111 U/L (ref 2–50)
ANION GAP SERPL CALC-SCNC: 7 MMOL/L (ref 7–16)
APTT PPP: 28.5 SEC (ref 24.7–36)
AST SERPL-CCNC: 94 U/L (ref 12–45)
BASOPHILS # BLD AUTO: 1.3 % (ref 0–1.8)
BASOPHILS # BLD: 0.07 K/UL (ref 0–0.12)
BILIRUB SERPL-MCNC: 4.4 MG/DL (ref 0.1–1.5)
BUN SERPL-MCNC: 16 MG/DL (ref 8–22)
CALCIUM SERPL-MCNC: 8.8 MG/DL (ref 8.4–10.2)
CHLORIDE SERPL-SCNC: 105 MMOL/L (ref 96–112)
CO2 SERPL-SCNC: 23 MMOL/L (ref 20–33)
COVID ORDER STATUS COVID19: NORMAL
CREAT SERPL-MCNC: 0.72 MG/DL (ref 0.5–1.4)
EOSINOPHIL # BLD AUTO: 0.25 K/UL (ref 0–0.51)
EOSINOPHIL NFR BLD: 4.8 % (ref 0–6.9)
ERYTHROCYTE [DISTWIDTH] IN BLOOD BY AUTOMATED COUNT: 52.6 FL (ref 35.9–50)
GLOBULIN SER CALC-MCNC: 2.4 G/DL (ref 1.9–3.5)
GLUCOSE SERPL-MCNC: 103 MG/DL (ref 65–99)
HCT VFR BLD AUTO: 33.9 % (ref 42–52)
HGB BLD-MCNC: 11.5 G/DL (ref 14–18)
IMM GRANULOCYTES # BLD AUTO: 0.02 K/UL (ref 0–0.11)
IMM GRANULOCYTES NFR BLD AUTO: 0.4 % (ref 0–0.9)
INR PPP: 1.04 (ref 0.87–1.13)
LIPASE SERPL-CCNC: 37 U/L (ref 7–58)
LYMPHOCYTES # BLD AUTO: 1.44 K/UL (ref 1–4.8)
LYMPHOCYTES NFR BLD: 27.6 % (ref 22–41)
MCH RBC QN AUTO: 33.5 PG (ref 27–33)
MCHC RBC AUTO-ENTMCNC: 33.9 G/DL (ref 33.7–35.3)
MCV RBC AUTO: 98.8 FL (ref 81.4–97.8)
MONOCYTES # BLD AUTO: 0.59 K/UL (ref 0–0.85)
MONOCYTES NFR BLD AUTO: 11.3 % (ref 0–13.4)
NEUTROPHILS # BLD AUTO: 2.84 K/UL (ref 1.82–7.42)
NEUTROPHILS NFR BLD: 54.6 % (ref 44–72)
NRBC # BLD AUTO: 0 K/UL
NRBC BLD-RTO: 0 /100 WBC
PLATELET # BLD AUTO: 272 K/UL (ref 164–446)
PMV BLD AUTO: 10.4 FL (ref 9–12.9)
POTASSIUM SERPL-SCNC: 4.5 MMOL/L (ref 3.6–5.5)
PROT SERPL-MCNC: 6.1 G/DL (ref 6–8.2)
PROTHROMBIN TIME: 13.7 SEC (ref 12–14.6)
RBC # BLD AUTO: 3.43 M/UL (ref 4.7–6.1)
SODIUM SERPL-SCNC: 135 MMOL/L (ref 135–145)
WBC # BLD AUTO: 5.2 K/UL (ref 4.8–10.8)

## 2020-07-24 PROCEDURE — 0FPB8DZ REMOVAL OF INTRALUMINAL DEVICE FROM HEPATOBILIARY DUCT, VIA NATURAL OR ARTIFICIAL OPENING ENDOSCOPIC: ICD-10-PCS | Performed by: INTERNAL MEDICINE

## 2020-07-24 PROCEDURE — 85025 COMPLETE CBC W/AUTO DIFF WBC: CPT

## 2020-07-24 PROCEDURE — 700105 HCHG RX REV CODE 258: Performed by: EMERGENCY MEDICINE

## 2020-07-24 PROCEDURE — 160208 HCHG ENDO MINUTES - EA ADDL 1 MIN LEVEL 4: Performed by: INTERNAL MEDICINE

## 2020-07-24 PROCEDURE — 700111 HCHG RX REV CODE 636 W/ 250 OVERRIDE (IP): Performed by: ANESTHESIOLOGY

## 2020-07-24 PROCEDURE — 99285 EMERGENCY DEPT VISIT HI MDM: CPT

## 2020-07-24 PROCEDURE — A9270 NON-COVERED ITEM OR SERVICE: HCPCS | Performed by: INTERNAL MEDICINE

## 2020-07-24 PROCEDURE — 500066 HCHG BITE BLOCK, ECT: Performed by: INTERNAL MEDICINE

## 2020-07-24 PROCEDURE — 85610 PROTHROMBIN TIME: CPT

## 2020-07-24 PROCEDURE — 160002 HCHG RECOVERY MINUTES (STAT): Performed by: INTERNAL MEDICINE

## 2020-07-24 PROCEDURE — C2617 STENT, NON-COR, TEM W/O DEL: HCPCS | Performed by: INTERNAL MEDICINE

## 2020-07-24 PROCEDURE — 700111 HCHG RX REV CODE 636 W/ 250 OVERRIDE (IP): Performed by: INTERNAL MEDICINE

## 2020-07-24 PROCEDURE — 160203 HCHG ENDO MINUTES - 1ST 30 MINS LEVEL 4: Performed by: INTERNAL MEDICINE

## 2020-07-24 PROCEDURE — 85730 THROMBOPLASTIN TIME PARTIAL: CPT

## 2020-07-24 PROCEDURE — 110371 HCHG SHELL REV 272: Performed by: INTERNAL MEDICINE

## 2020-07-24 PROCEDURE — 99223 1ST HOSP IP/OBS HIGH 75: CPT | Mod: AI | Performed by: INTERNAL MEDICINE

## 2020-07-24 PROCEDURE — 36415 COLL VENOUS BLD VENIPUNCTURE: CPT

## 2020-07-24 PROCEDURE — 83690 ASSAY OF LIPASE: CPT

## 2020-07-24 PROCEDURE — 700105 HCHG RX REV CODE 258: Performed by: INTERNAL MEDICINE

## 2020-07-24 PROCEDURE — 700101 HCHG RX REV CODE 250: Performed by: ANESTHESIOLOGY

## 2020-07-24 PROCEDURE — 160035 HCHG PACU - 1ST 60 MINS PHASE I: Performed by: INTERNAL MEDICINE

## 2020-07-24 PROCEDURE — 160009 HCHG ANES TIME/MIN: Performed by: INTERNAL MEDICINE

## 2020-07-24 PROCEDURE — 700102 HCHG RX REV CODE 250 W/ 637 OVERRIDE(OP): Performed by: INTERNAL MEDICINE

## 2020-07-24 PROCEDURE — 160048 HCHG OR STATISTICAL LEVEL 1-5: Performed by: INTERNAL MEDICINE

## 2020-07-24 PROCEDURE — 502240 HCHG MISC OR SUPPLY RC 0272: Performed by: INTERNAL MEDICINE

## 2020-07-24 PROCEDURE — 770006 HCHG ROOM/CARE - MED/SURG/GYN SEMI*

## 2020-07-24 PROCEDURE — C9803 HOPD COVID-19 SPEC COLLECT: HCPCS | Performed by: EMERGENCY MEDICINE

## 2020-07-24 PROCEDURE — 700105 HCHG RX REV CODE 258: Performed by: ANESTHESIOLOGY

## 2020-07-24 PROCEDURE — 80053 COMPREHEN METABOLIC PANEL: CPT

## 2020-07-24 PROCEDURE — 0F798DZ DILATION OF COMMON BILE DUCT WITH INTRALUMINAL DEVICE, VIA NATURAL OR ARTIFICIAL OPENING ENDOSCOPIC: ICD-10-PCS | Performed by: INTERNAL MEDICINE

## 2020-07-24 PROCEDURE — U0003 INFECTIOUS AGENT DETECTION BY NUCLEIC ACID (DNA OR RNA); SEVERE ACUTE RESPIRATORY SYNDROME CORONAVIRUS 2 (SARS-COV-2) (CORONAVIRUS DISEASE [COVID-19]), AMPLIFIED PROBE TECHNIQUE, MAKING USE OF HIGH THROUGHPUT TECHNOLOGIES AS DESCRIBED BY CMS-2020-01-R: HCPCS

## 2020-07-24 DEVICE — STENT BILIARY ADVANTIX 10FR X 9CM: Type: IMPLANTABLE DEVICE | Site: ABDOMEN | Status: FUNCTIONAL

## 2020-07-24 RX ORDER — OXYCODONE HYDROCHLORIDE AND ACETAMINOPHEN 5; 325 MG/1; MG/1
2 TABLET ORAL
Status: DISCONTINUED | OUTPATIENT
Start: 2020-07-24 | End: 2020-07-24 | Stop reason: HOSPADM

## 2020-07-24 RX ORDER — SODIUM CHLORIDE, SODIUM LACTATE, POTASSIUM CHLORIDE, CALCIUM CHLORIDE 600; 310; 30; 20 MG/100ML; MG/100ML; MG/100ML; MG/100ML
INJECTION, SOLUTION INTRAVENOUS CONTINUOUS
Status: CANCELLED | OUTPATIENT
Start: 2020-07-24 | End: 2020-07-25

## 2020-07-24 RX ORDER — LISINOPRIL 5 MG/1
10 TABLET ORAL DAILY
Status: DISCONTINUED | OUTPATIENT
Start: 2020-07-24 | End: 2020-07-25 | Stop reason: HOSPADM

## 2020-07-24 RX ORDER — HYDROMORPHONE HYDROCHLORIDE 1 MG/ML
0.1 INJECTION, SOLUTION INTRAMUSCULAR; INTRAVENOUS; SUBCUTANEOUS
Status: DISCONTINUED | OUTPATIENT
Start: 2020-07-24 | End: 2020-07-24 | Stop reason: HOSPADM

## 2020-07-24 RX ORDER — ONDANSETRON 2 MG/ML
INJECTION INTRAMUSCULAR; INTRAVENOUS PRN
Status: DISCONTINUED | OUTPATIENT
Start: 2020-07-24 | End: 2020-07-24 | Stop reason: SURG

## 2020-07-24 RX ORDER — HALOPERIDOL 5 MG/ML
1 INJECTION INTRAMUSCULAR
Status: DISCONTINUED | OUTPATIENT
Start: 2020-07-24 | End: 2020-07-24 | Stop reason: HOSPADM

## 2020-07-24 RX ORDER — MEPERIDINE HYDROCHLORIDE 25 MG/ML
12.5 INJECTION INTRAMUSCULAR; INTRAVENOUS; SUBCUTANEOUS
Status: DISCONTINUED | OUTPATIENT
Start: 2020-07-24 | End: 2020-07-24 | Stop reason: HOSPADM

## 2020-07-24 RX ORDER — HYDRALAZINE HYDROCHLORIDE 20 MG/ML
5 INJECTION INTRAMUSCULAR; INTRAVENOUS
Status: DISCONTINUED | OUTPATIENT
Start: 2020-07-24 | End: 2020-07-24 | Stop reason: HOSPADM

## 2020-07-24 RX ORDER — BISACODYL 10 MG
10 SUPPOSITORY, RECTAL RECTAL
Status: DISCONTINUED | OUTPATIENT
Start: 2020-07-24 | End: 2020-07-25 | Stop reason: HOSPADM

## 2020-07-24 RX ORDER — HYDROMORPHONE HYDROCHLORIDE 1 MG/ML
0.2 INJECTION, SOLUTION INTRAMUSCULAR; INTRAVENOUS; SUBCUTANEOUS
Status: DISCONTINUED | OUTPATIENT
Start: 2020-07-24 | End: 2020-07-24 | Stop reason: HOSPADM

## 2020-07-24 RX ORDER — PIPERACILLIN SODIUM, TAZOBACTAM SODIUM 3; .375 G/15ML; G/15ML
INJECTION, POWDER, LYOPHILIZED, FOR SOLUTION INTRAVENOUS PRN
Status: DISCONTINUED | OUTPATIENT
Start: 2020-07-24 | End: 2020-07-24 | Stop reason: SURG

## 2020-07-24 RX ORDER — MORPHINE SULFATE 4 MG/ML
2 INJECTION, SOLUTION INTRAMUSCULAR; INTRAVENOUS EVERY 4 HOURS PRN
Status: DISCONTINUED | OUTPATIENT
Start: 2020-07-24 | End: 2020-07-25 | Stop reason: HOSPADM

## 2020-07-24 RX ORDER — SODIUM CHLORIDE 9 MG/ML
INJECTION, SOLUTION INTRAVENOUS CONTINUOUS
Status: DISCONTINUED | OUTPATIENT
Start: 2020-07-24 | End: 2020-07-25 | Stop reason: HOSPADM

## 2020-07-24 RX ORDER — AMOXICILLIN 250 MG
2 CAPSULE ORAL 2 TIMES DAILY
Status: DISCONTINUED | OUTPATIENT
Start: 2020-07-24 | End: 2020-07-25 | Stop reason: HOSPADM

## 2020-07-24 RX ORDER — LABETALOL HYDROCHLORIDE 5 MG/ML
5 INJECTION, SOLUTION INTRAVENOUS
Status: DISCONTINUED | OUTPATIENT
Start: 2020-07-24 | End: 2020-07-24 | Stop reason: HOSPADM

## 2020-07-24 RX ORDER — ONDANSETRON 2 MG/ML
4 INJECTION INTRAMUSCULAR; INTRAVENOUS
Status: DISCONTINUED | OUTPATIENT
Start: 2020-07-24 | End: 2020-07-24 | Stop reason: HOSPADM

## 2020-07-24 RX ORDER — SUCCINYLCHOLINE/SOD CL,ISO/PF 200MG/10ML
SYRINGE (ML) INTRAVENOUS PRN
Status: DISCONTINUED | OUTPATIENT
Start: 2020-07-24 | End: 2020-07-24 | Stop reason: SURG

## 2020-07-24 RX ORDER — MIDAZOLAM HYDROCHLORIDE 1 MG/ML
1 INJECTION INTRAMUSCULAR; INTRAVENOUS
Status: DISCONTINUED | OUTPATIENT
Start: 2020-07-24 | End: 2020-07-24 | Stop reason: HOSPADM

## 2020-07-24 RX ORDER — HEPARIN SODIUM 5000 [USP'U]/ML
5000 INJECTION, SOLUTION INTRAVENOUS; SUBCUTANEOUS EVERY 8 HOURS
Status: DISCONTINUED | OUTPATIENT
Start: 2020-07-24 | End: 2020-07-25 | Stop reason: HOSPADM

## 2020-07-24 RX ORDER — DEXTROSE AND SODIUM CHLORIDE 5; .45 G/100ML; G/100ML
INJECTION, SOLUTION INTRAVENOUS CONTINUOUS
Status: DISCONTINUED | OUTPATIENT
Start: 2020-07-24 | End: 2020-07-25 | Stop reason: HOSPADM

## 2020-07-24 RX ORDER — POLYETHYLENE GLYCOL 3350 17 G/17G
1 POWDER, FOR SOLUTION ORAL
Status: DISCONTINUED | OUTPATIENT
Start: 2020-07-24 | End: 2020-07-25 | Stop reason: HOSPADM

## 2020-07-24 RX ORDER — METOPROLOL TARTRATE 1 MG/ML
1 INJECTION, SOLUTION INTRAVENOUS
Status: DISCONTINUED | OUTPATIENT
Start: 2020-07-24 | End: 2020-07-24 | Stop reason: HOSPADM

## 2020-07-24 RX ORDER — SODIUM CHLORIDE, SODIUM LACTATE, POTASSIUM CHLORIDE, CALCIUM CHLORIDE 600; 310; 30; 20 MG/100ML; MG/100ML; MG/100ML; MG/100ML
INJECTION, SOLUTION INTRAVENOUS CONTINUOUS
Status: DISCONTINUED | OUTPATIENT
Start: 2020-07-24 | End: 2020-07-24 | Stop reason: HOSPADM

## 2020-07-24 RX ORDER — TAMSULOSIN HYDROCHLORIDE 0.4 MG/1
0.4 CAPSULE ORAL
Status: DISCONTINUED | OUTPATIENT
Start: 2020-07-24 | End: 2020-07-25 | Stop reason: HOSPADM

## 2020-07-24 RX ORDER — OXYCODONE HYDROCHLORIDE AND ACETAMINOPHEN 5; 325 MG/1; MG/1
1 TABLET ORAL
Status: DISCONTINUED | OUTPATIENT
Start: 2020-07-24 | End: 2020-07-24 | Stop reason: HOSPADM

## 2020-07-24 RX ORDER — HYDROMORPHONE HYDROCHLORIDE 1 MG/ML
0.4 INJECTION, SOLUTION INTRAMUSCULAR; INTRAVENOUS; SUBCUTANEOUS
Status: DISCONTINUED | OUTPATIENT
Start: 2020-07-24 | End: 2020-07-24 | Stop reason: HOSPADM

## 2020-07-24 RX ORDER — OMEPRAZOLE 20 MG/1
20 CAPSULE, DELAYED RELEASE ORAL DAILY
Status: DISCONTINUED | OUTPATIENT
Start: 2020-07-24 | End: 2020-07-25 | Stop reason: HOSPADM

## 2020-07-24 RX ORDER — SODIUM CHLORIDE, SODIUM LACTATE, POTASSIUM CHLORIDE, CALCIUM CHLORIDE 600; 310; 30; 20 MG/100ML; MG/100ML; MG/100ML; MG/100ML
INJECTION, SOLUTION INTRAVENOUS
Status: DISCONTINUED | OUTPATIENT
Start: 2020-07-24 | End: 2020-07-24 | Stop reason: SURG

## 2020-07-24 RX ORDER — DIPHENHYDRAMINE HYDROCHLORIDE 50 MG/ML
12.5 INJECTION INTRAMUSCULAR; INTRAVENOUS
Status: DISCONTINUED | OUTPATIENT
Start: 2020-07-24 | End: 2020-07-24 | Stop reason: HOSPADM

## 2020-07-24 RX ORDER — ONDANSETRON 4 MG/1
4 TABLET, ORALLY DISINTEGRATING ORAL EVERY 4 HOURS PRN
Status: DISCONTINUED | OUTPATIENT
Start: 2020-07-24 | End: 2020-07-25 | Stop reason: HOSPADM

## 2020-07-24 RX ORDER — LIDOCAINE HYDROCHLORIDE 20 MG/ML
INJECTION, SOLUTION EPIDURAL; INFILTRATION; INTRACAUDAL; PERINEURAL PRN
Status: DISCONTINUED | OUTPATIENT
Start: 2020-07-24 | End: 2020-07-24 | Stop reason: SURG

## 2020-07-24 RX ORDER — ONDANSETRON 2 MG/ML
4 INJECTION INTRAMUSCULAR; INTRAVENOUS EVERY 4 HOURS PRN
Status: DISCONTINUED | OUTPATIENT
Start: 2020-07-24 | End: 2020-07-25 | Stop reason: HOSPADM

## 2020-07-24 RX ADMIN — OMEPRAZOLE 20 MG: 20 CAPSULE, DELAYED RELEASE ORAL at 11:41

## 2020-07-24 RX ADMIN — LISINOPRIL 10 MG: 5 TABLET ORAL at 11:41

## 2020-07-24 RX ADMIN — SODIUM CHLORIDE, POTASSIUM CHLORIDE, SODIUM LACTATE AND CALCIUM CHLORIDE: 600; 310; 30; 20 INJECTION, SOLUTION INTRAVENOUS at 16:55

## 2020-07-24 RX ADMIN — EPHEDRINE SULFATE 25 MG: 50 INJECTION INTRAMUSCULAR; INTRAVENOUS; SUBCUTANEOUS at 16:55

## 2020-07-24 RX ADMIN — DEXTROSE AND SODIUM CHLORIDE 1000 ML: 5; 450 INJECTION, SOLUTION INTRAVENOUS at 08:47

## 2020-07-24 RX ADMIN — PROPOFOL 200 MG: 10 INJECTION, EMULSION INTRAVENOUS at 16:55

## 2020-07-24 RX ADMIN — ONDANSETRON 4 MG: 2 INJECTION INTRAMUSCULAR; INTRAVENOUS at 16:55

## 2020-07-24 RX ADMIN — EPHEDRINE SULFATE 25 MG: 50 INJECTION INTRAMUSCULAR; INTRAVENOUS; SUBCUTANEOUS at 17:21

## 2020-07-24 RX ADMIN — LIDOCAINE HYDROCHLORIDE 50 MG: 20 INJECTION, SOLUTION EPIDURAL; INFILTRATION; INTRACAUDAL; PERINEURAL at 16:55

## 2020-07-24 RX ADMIN — PIPERACILLIN AND TAZOBACTAM 3.38 G: 3; .375 INJECTION, POWDER, LYOPHILIZED, FOR SOLUTION INTRAVENOUS; PARENTERAL at 17:06

## 2020-07-24 RX ADMIN — HEPARIN SODIUM 5000 UNITS: 5000 INJECTION, SOLUTION INTRAVENOUS; SUBCUTANEOUS at 22:03

## 2020-07-24 RX ADMIN — Medication 100 MG: at 16:55

## 2020-07-24 RX ADMIN — SODIUM CHLORIDE: 9 INJECTION, SOLUTION INTRAVENOUS at 11:40

## 2020-07-24 ASSESSMENT — COGNITIVE AND FUNCTIONAL STATUS - GENERAL
SUGGESTED CMS G CODE MODIFIER MOBILITY: CH
MOBILITY SCORE: 24
DAILY ACTIVITIY SCORE: 24
SUGGESTED CMS G CODE MODIFIER DAILY ACTIVITY: CH
MOBILITY SCORE: 24
SUGGESTED CMS G CODE MODIFIER MOBILITY: CH

## 2020-07-24 ASSESSMENT — ENCOUNTER SYMPTOMS
EYE REDNESS: 0
BACK PAIN: 0
RESPIRATORY NEGATIVE: 1
DEPRESSION: 0
NECK PAIN: 0
FOCAL WEAKNESS: 0
EYE PAIN: 0
HEADACHES: 0
CHILLS: 0
SPUTUM PRODUCTION: 0
VOMITING: 0
SHORTNESS OF BREATH: 0
ORTHOPNEA: 0
SEIZURES: 0
COUGH: 0
MUSCULOSKELETAL NEGATIVE: 1
WEIGHT LOSS: 1
CONSTIPATION: 0
BLURRED VISION: 0
PALPITATIONS: 0
INSOMNIA: 0
WEIGHT LOSS: 0
MYALGIAS: 0
NEUROLOGICAL NEGATIVE: 1
NERVOUS/ANXIOUS: 0
EYE DISCHARGE: 0
ABDOMINAL PAIN: 0
NAUSEA: 0
DIZZINESS: 0
HEARTBURN: 0
DIARRHEA: 0
CARDIOVASCULAR NEGATIVE: 1
BLOOD IN STOOL: 0
FEVER: 0
STRIDOR: 0
NERVOUS/ANXIOUS: 1
EYES NEGATIVE: 1
ABDOMINAL PAIN: 1

## 2020-07-24 ASSESSMENT — LIFESTYLE VARIABLES
HOW MANY TIMES IN THE PAST YEAR HAVE YOU HAD 5 OR MORE DRINKS IN A DAY: 0
TOTAL SCORE: 0
TOTAL SCORE: 0
HAVE YOU EVER FELT YOU SHOULD CUT DOWN ON YOUR DRINKING: NO
EVER HAD A DRINK FIRST THING IN THE MORNING TO STEADY YOUR NERVES TO GET RID OF A HANGOVER: NO
EVER FELT BAD OR GUILTY ABOUT YOUR DRINKING: NO
TOTAL SCORE: 0
ON A TYPICAL DAY WHEN YOU DRINK ALCOHOL HOW MANY DRINKS DO YOU HAVE: 1
ALCOHOL_USE: YES
CONSUMPTION TOTAL: NEGATIVE
AVERAGE NUMBER OF DAYS PER WEEK YOU HAVE A DRINK CONTAINING ALCOHOL: 2
HAVE PEOPLE ANNOYED YOU BY CRITICIZING YOUR DRINKING: NO

## 2020-07-24 ASSESSMENT — PAIN SCALES - GENERAL: PAIN_LEVEL: 0

## 2020-07-24 ASSESSMENT — PATIENT HEALTH QUESTIONNAIRE - PHQ9
2. FEELING DOWN, DEPRESSED, IRRITABLE, OR HOPELESS: NOT AT ALL
SUM OF ALL RESPONSES TO PHQ9 QUESTIONS 1 AND 2: 0
1. LITTLE INTEREST OR PLEASURE IN DOING THINGS: NOT AT ALL

## 2020-07-24 ASSESSMENT — FIBROSIS 4 INDEX: FIB4 SCORE: 2.35

## 2020-07-24 NOTE — ED PROVIDER NOTES
ED Provider Note    CHIEF COMPLAINT  Chief Complaint   Patient presents with   • Jaundice     Here stating he needs a new stent or to have it cleaned out, recent stent placement and DX of pancreatic cancer.       HPI  Janes Cody is a 64 y.o. male who presents to the emergency department at the recommendation of his oncologist and gastroenterologist.  Patient has a history of fairly recently diagnosed pancreatic cancer.  He has a stent in place.  His jaundice has not improved and may be worsened.  There is plan to have his stent replaced.  He was told to come to this hospital today.  He denies having any fevers or any increased abdominal pain.  No chest pain shortness of breath or cough.  No congestion runny nose or difficulty breathing.    Chart reviewed.  Port-A-Cath placed 7/16 by Dr. Wells.  Hospitalized and discharged on 6/23 after evaluation and placement of new biliary stents with ERCP on 6/22.    COVID PCR was negative on 7/15    REVIEW OF SYSTEMS  As per HPI, otherwise a 10 point review of systems is negative    PAST MEDICAL HISTORY  Past Medical History:   Diagnosis Date   • Cancer (HCC) 2020    pancreatic   • Dental disorder     full upper and lower   • Heart burn    • Hypertension    • Jaundice    • Seizure disorder (HCC)     had a couple seizures once but not on meds and never dx with anything       SOCIAL HISTORY  Social History     Tobacco Use   • Smoking status: Current Every Day Smoker     Packs/day: 0.25     Years: 50.00     Pack years: 12.50     Types: Cigarettes   • Smokeless tobacco: Never Used   Substance Use Topics   • Alcohol use: Yes     Comment: occassional   • Drug use: Yes     Comment: marijuana       SURGICAL HISTORY  Past Surgical History:   Procedure Laterality Date   • CATH PLACEMENT Left 7/16/2020    Procedure: INSERTION, CATHETER-PORT A CATH;  Surgeon: Rob Wells M.D.;  Location: SURGERY [facility];  Service: General   • PB ERCP,DIAGNOSTIC  6/22/2020     "Procedure: ERCP, DIAGNOSTIC;  Surgeon: Melvin Marquis M.D.;  Location: SURGERY [facility];  Service: Gastroenterology   • PB ESOPHAGOSCOPY,ENDOSCOPIC ULTRASND  6/22/2020    Procedure: ESOPHAGOSCOPY, WITH ENDOSCOPIC US;  Surgeon: Melvin Marquis M.D.;  Location: SURGERY [facility];  Service: Gastroenterology   • ELBOW ARTHROSCOPY         CURRENT MEDICATIONS  Home Medications     Reviewed by Arnaldo Pacheco (Pharmacy Tech) on 07/24/20 at 0821  Med List Status: Complete   Medication Last Dose Status   lisinopril (PRINIVIL) 10 MG Tab 7/22/2020 Active   pantoprazole (PROTONIX) 40 MG Tablet Delayed Response 7/22/2020 Active   tamsulosin (FLOMAX) 0.4 MG capsule 7/22/2020 Active                ALLERGIES  No Known Allergies    PHYSICAL EXAM  VITAL SIGNS: /82   Pulse 68   Temp 36.4 °C (97.5 °F) (Temporal)   Resp 16   Ht 1.854 m (6' 1\")   Wt 64.5 kg (142 lb 3.2 oz)   SpO2 99%   BMI 18.76 kg/m²    Constitutional: Awake and alert.  Frail, jaundiced male  HENT:  Atraumatic, Normocephalic.Oropharynx dry mucus membranes, Nose normal inspection.   Eyes: Icterus  Neck: Supple  Cardiovascular: Normal heart rate, Normal rhythm.  Symmetric peripheral pulses.   Thorax & Lungs: No respiratory distress, No wheezing, No rales, No rhonchi, No chest tenderness.  Port-A-Cath  Abdomen: Bowel sounds normal, soft, non-distended, nontender, no mass  Skin: Jaundice  Back: No tenderness, No CVA tenderness.   Extremities: No clubbing, cyanosis, edema, no Homans or cords   Neurologic: Grossly normal   Psychiatric: Anxious appearing    RADIOLOGY/PROCEDURES  No orders to display        Imaging is interpreted by radiologist    Labs:  Results for orders placed or performed during the hospital encounter of 07/24/20   CBC WITH DIFFERENTIAL   Result Value Ref Range    WBC 5.2 4.8 - 10.8 K/uL    RBC 3.43 (L) 4.70 - 6.10 M/uL    Hemoglobin 11.5 (L) 14.0 - 18.0 g/dL    Hematocrit 33.9 (L) 42.0 - 52.0 %    MCV 98.8 (H) " 81.4 - 97.8 fL    MCH 33.5 (H) 27.0 - 33.0 pg    MCHC 33.9 33.7 - 35.3 g/dL    RDW 52.6 (H) 35.9 - 50.0 fL    Platelet Count 272 164 - 446 K/uL    MPV 10.4 9.0 - 12.9 fL    Neutrophils-Polys 54.60 44.00 - 72.00 %    Lymphocytes 27.60 22.00 - 41.00 %    Monocytes 11.30 0.00 - 13.40 %    Eosinophils 4.80 0.00 - 6.90 %    Basophils 1.30 0.00 - 1.80 %    Immature Granulocytes 0.40 0.00 - 0.90 %    Nucleated RBC 0.00 /100 WBC    Neutrophils (Absolute) 2.84 1.82 - 7.42 K/uL    Lymphs (Absolute) 1.44 1.00 - 4.80 K/uL    Monos (Absolute) 0.59 0.00 - 0.85 K/uL    Eos (Absolute) 0.25 0.00 - 0.51 K/uL    Baso (Absolute) 0.07 0.00 - 0.12 K/uL    Immature Granulocytes (abs) 0.02 0.00 - 0.11 K/uL    NRBC (Absolute) 0.00 K/uL   COMP METABOLIC PANEL   Result Value Ref Range    Sodium 135 135 - 145 mmol/L    Potassium 4.5 3.6 - 5.5 mmol/L    Chloride 105 96 - 112 mmol/L    Co2 23 20 - 33 mmol/L    Anion Gap 7.0 7.0 - 16.0    Glucose 103 (H) 65 - 99 mg/dL    Bun 16 8 - 22 mg/dL    Creatinine 0.72 0.50 - 1.40 mg/dL    Calcium 8.8 8.4 - 10.2 mg/dL    AST(SGOT) 94 (H) 12 - 45 U/L    ALT(SGPT) 111 (H) 2 - 50 U/L    Alkaline Phosphatase 169 (H) 30 - 99 U/L    Total Bilirubin 4.4 (H) 0.1 - 1.5 mg/dL    Albumin 3.7 3.2 - 4.9 g/dL    Total Protein 6.1 6.0 - 8.2 g/dL    Globulin 2.4 1.9 - 3.5 g/dL    A-G Ratio 1.5 g/dL   LIPASE   Result Value Ref Range    Lipase 37 7 - 58 U/L   APTT   Result Value Ref Range    APTT 28.5 24.7 - 36.0 sec   PROTHROMBIN TIME (INR)   Result Value Ref Range    PT 13.7 12.0 - 14.6 sec    INR 1.04 0.87 - 1.13   ESTIMATED GFR   Result Value Ref Range    GFR If African American >60 >60 mL/min/1.73 m 2    GFR If Non African American >60 >60 mL/min/1.73 m 2       Medications   D5 1/2 NS infusion (has no administration in time range)     Hydration: Patient was given IV fluids because of n.p.o. status anticipating surgery.  Stable on recheck.    COURSE & MEDICAL DECISION MAKING  Patient presents with biliary obstruction.   I contacted Dr. Juan.  Reviewed the case.  He plans on ERCP today or tomorrow.  Requested stat COVID screen which was ordered.  Screening labs were ordered.  Patient will be admitted to the medicine service.  Discussed with Dr. Cruz.      FINAL IMPRESSION  1.  Pancreatic cancer with biliary obstruction      This dictation was created using voice recognition software. The accuracy of the dictation is limited to the abilities of the software.  The nursing notes were reviewed and certain aspects of this information were incorporated into this note.      Electronically signed by: Jesu No M.D., 7/24/2020 8:31 AM

## 2020-07-24 NOTE — CONSULTS
Date of service: 7/24/2020    Attending Physician: No att. providers found    History of Present Illness: Janes Cody is a 64 y.o. male here for biliary obstruction.    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    He underwent stent placement about a month ago but has not seen a significant improvement in his jaundice he says.    He was seen by the pancreato-biliary surgeon and found to have borderline resectability.     He was referred to Dr. Harden from oncology who anticipated chem and a chest port was placed for that purpose but he has not receivec any chemo to date.    He has lost 30 pounds. He is frustrated about nohaving had anything to eat today.    His bili is around 4. He has mild tenderness in upper abdomen. No severe pain or fever.      Review of Systems:    Review of Systems   Constitutional: Positive for malaise/fatigue and weight loss. Negative for chills and fever.   HENT: Negative.    Eyes: Negative.    Respiratory: Negative.         Chronic smoker   Cardiovascular: Negative.    Gastrointestinal: Positive for abdominal pain. Negative for blood in stool, constipation, diarrhea and melena.   Genitourinary: Negative.    Musculoskeletal: Negative.    Neurological: Negative.    Endo/Heme/Allergies: Negative.    Psychiatric/Behavioral: The patient is nervous/anxious.        Current Facility-Administered Medications   Medication Dose Route Frequency Provider Last Rate Last Dose   • D5 1/2 NS infusion   Intravenous Continuous Jesu No M.D. 100 mL/hr at 07/24/20 0847 1,000 mL at 07/24/20 0847   • lisinopril (PRINIVIL) tablet 10 mg  10 mg Oral DAILY Jordin Lawrence M.D.   10 mg at 07/24/20 1141   • omeprazole (PRILOSEC) capsule 20 mg  20 mg Oral DAILY Jordin Lawrence M.D.   20 mg at 07/24/20 1141   • tamsulosin (FLOMAX) capsule 0.4 mg  0.4 mg Oral AFTER BREAKFAST Jordin Lawrence M.D.   Stopped at 07/24/20 1141   • senna-docusate  (PERICOLACE or SENOKOT S) 8.6-50 MG per tablet 2 Tab  2 Tab Oral BID Jordin Lawrence M.D.   Stopped at 07/24/20 1141    And   • polyethylene glycol/lytes (MIRALAX) PACKET 1 Packet  1 Packet Oral QDAY PRN Jordin Lawrence M.D.        And   • magnesium hydroxide (MILK OF MAGNESIA) suspension 30 mL  30 mL Oral QDAY PRN Jordin Lawrence M.D.        And   • bisacodyl (DULCOLAX) suppository 10 mg  10 mg Rectal QDAY PRN Jordin Lawrence M.D.       • NS infusion   Intravenous Continuous Jordin Lawrence M.D. 83 mL/hr at 07/24/20 1140     • heparin injection 5,000 Units  5,000 Units Subcutaneous Q8HRS Jordin Lawrence M.D.   Stopped at 07/24/20 1142   • ondansetron (ZOFRAN) syringe/vial injection 4 mg  4 mg Intravenous Q4HRS PRN Jordin Lawrence M.D.       • ondansetron (ZOFRAN ODT) dispertab 4 mg  4 mg Oral Q4HRS PRN Jordin Lawrence M.D.       • morphine (pf) 4 MG/ML injection 2 mg  2 mg Intravenous Q4HRS PRN Jordin Lawrence M.D.           Social History     Tobacco Use   • Smoking status: Current Every Day Smoker     Packs/day: 0.25     Years: 50.00     Pack years: 12.50     Types: Cigarettes   • Smokeless tobacco: Never Used   Substance Use Topics   • Alcohol use: Yes     Comment: occassional   • Drug use: Yes     Comment: marijuana        Past Medical History:   Diagnosis Date   • Cancer (HCC) 2020    pancreatic   • Dental disorder     full upper and lower   • Heart burn    • Hypertension    • Jaundice    • Seizure disorder (HCC)     had a couple seizures once but not on meds and never dx with anything       Past Surgical History:   Procedure Laterality Date   • CATH PLACEMENT Left 7/16/2020    Procedure: INSERTION, CATHETER-PORT A CATH;  Surgeon: Rob Wells M.D.;  Location: SURGERY [facility];  Service: General   • PB ERCP,DIAGNOSTIC  6/22/2020    Procedure: ERCP, DIAGNOSTIC;  Surgeon: Melvin Marquis M.D.;  Location: SURGERY [facility];  Service: Gastroenterology   • PB ESOPHAGOSCOPY,ENDOSCOPIC ULTRASND  6/22/2020    Procedure:  ESOPHAGOSCOPY, WITH ENDOSCOPIC US;  Surgeon: Melvin Marquis M.D.;  Location: SURGERY [facility];  Service: Gastroenterology   • ELBOW ARTHROSCOPY         Allergies: Patient has no known allergies.    No family history on file.    Vitals:    07/24/20 1125   BP: 150/70   Pulse: (!) 55   Resp: 16   Temp: 36.7 °C (98.1 °F)   SpO2: 100%        Physical Examination:    Physical Exam   Constitutional: He is oriented to person, place, and time and well-developed, well-nourished, and in no distress.   HENT:   Head: Normocephalic and atraumatic.   Eyes: No scleral icterus.   Neck: No tracheal deviation present.   Cardiovascular: Normal rate and regular rhythm.   Pulmonary/Chest: Effort normal. No stridor. No respiratory distress.   Abdominal: He exhibits no distension and no mass. There is abdominal tenderness. There is no rebound and no guarding.   Musculoskeletal: Normal range of motion.   Neurological: He is alert and oriented to person, place, and time.   Skin: Skin is warm and dry.   Psychiatric: Affect and judgment normal.         Lab Results   Component Value Date/Time    PROTHROMBTM 13.7 07/24/2020 08:12 AM    INR 1.04 07/24/2020 08:12 AM      Lab Results   Component Value Date/Time    WBC 5.2 07/24/2020 08:12 AM    RBC 3.43 (L) 07/24/2020 08:12 AM    HEMOGLOBIN 11.5 (L) 07/24/2020 08:12 AM    HEMATOCRIT 33.9 (L) 07/24/2020 08:12 AM    MCV 98.8 (H) 07/24/2020 08:12 AM    MCH 33.5 (H) 07/24/2020 08:12 AM    MCHC 33.9 07/24/2020 08:12 AM    MPV 10.4 07/24/2020 08:12 AM    NEUTSPOLYS 54.60 07/24/2020 08:12 AM    LYMPHOCYTES 27.60 07/24/2020 08:12 AM    MONOCYTES 11.30 07/24/2020 08:12 AM    EOSINOPHILS 4.80 07/24/2020 08:12 AM    BASOPHILS 1.30 07/24/2020 08:12 AM    ANISOCYTOSIS 1+ 06/20/2020 11:23 AM      Lab Results   Component Value Date/Time    SODIUM 135 07/24/2020 08:12 AM    POTASSIUM 4.5 07/24/2020 08:12 AM    CHLORIDE 105 07/24/2020 08:12 AM    CO2 23 07/24/2020 08:12 AM    GLUCOSE 103 (H) 07/24/2020  08:12 AM    BUN 16 07/24/2020 08:12 AM    CREATININE 0.72 07/24/2020 08:12 AM      Recent Labs     07/24/20  0812   ASTSGOT 94*   ALTSGPT 111*   TBILIRUBIN 4.4*   ALKPHOSPHAT 169*   GLOBULIN 2.4   INR 1.04       Imaging:   Dx-chest-portable (1 View)    Result Date: 7/16/2020 7/16/2020 3:38 PM HISTORY/REASON FOR EXAM: Post port placement,  r/o pneumothorax TECHNIQUE/EXAM DESCRIPTION AND NUMBER OF VIEWS: Single AP view of the chest. COMPARISON: 9 FINDINGS: Hardware: None. Lungs: Left subclavian port tip overlies the SVC Pleura:  No pleural space process is seen. Heart and mediastinum: The cardiomediastinal contours are normal.     No pneumothorax following left portacatheter placement    Dx-portable Fluoroscopy < 1 Hour    Result Date: 7/16/2020 7/16/2020 1:52 PM HISTORY/REASON FOR EXAM:  Intraoperative evaluation TECHNIQUE/EXAM DESCRIPTION AND NUMBER OF VIEWS: Fluoroscopic image of the chest COMPARISON: None FINDINGS: 1 image was obtained in the operating room during port placement. A total of 6 seconds of fluoroscopy time utilized.     Intraoperative image as above described.     [id number] Base To Mid-thigh    Result Date: 7/15/2020  7/15/2020 8:59 AM HISTORY/REASON FOR EXAM:  Pancreatic cancer, staging TECHNIQUE/EXAM DESCRIPTION AND NUMBER OF VIEWS: PET body imaging. Initially, 13.85 mCi F-18 FDG was administered intravenously under standardized conditions. Approximately 45 minutes after FDG administration, the patient was placed in the supine position on the PET CT table. Blood glucose level was 100 mg/dL. Low dose spiral CT imaging was performed from the skull base to the mid thighs. PET imaging was then performed from the skull base to the mid thighs. CT images, PET images, and PET/CT fused images were reviewed on a PACS 3D workstation. The limited non-contrast CT data are used primarily for attenuation correction and anatomic correlation.  Evaluation of solid organs and bowel are especially limited  utilizing this technique. A low dose CT was obtained of the same area without IV contrast for attenuation correction and coregistration, not for a diagnostic scan. COMPARISON: CT dated 6/21/2020. FINDINGS: VISUALIZED BRAIN: Normal metabolic activity. HEAD AND NECK: Normal physiologic uptake. CHEST: Lungs show no hypermetabolic pulmonary nodules. There is no hypermetabolic hilar, mediastinal, or axillary lymphadenopathy. Atherosclerosis. Coronary calcifications. ABDOMEN AND PELVIS: There is normal, uniform metabolic activity in the liver, spleen, adrenal glands, kidneys. Ill-defined hypermetabolism in the pancreatic head region within SUV max of 3.83, consistent with malignancy. There is no hypermetabolic mesenteric, retroperitoneal, iliac, or inguinal lymphadenopathy. Nonspecific physiologic activity in the colon and intestine is noted. Pneumobilia. CBD stent. VISUALIZED MUSCULOSKELETAL SYSTEM: No hypermetabolic osseous lesions.     1. Known pancreatic head mass, consistent with malignancy. 2. No FDG avid metastatic disease.            Assessment and Plan:  Biliary obstruction  Suspected stent occlusion and or migration  Pancreatic neoplasm adeno CA  Borderline resectability with tumor abutting the portal vein  Chemo anticipated once the bilirubin normalizes    PLAN:  ERCP with stent change

## 2020-07-24 NOTE — ASSESSMENT & PLAN NOTE
With recent history of stent placement  GI consulted and plan to have a new stent placed today  NPO  Pain control as needed  Follow cmp in am

## 2020-07-24 NOTE — ANESTHESIA PREPROCEDURE EVALUATION
Relevant Problems   CARDIAC   (+) Essential hypertension       Physical Exam    Airway   Mallampati: II  TM distance: >3 FB  Neck ROM: full       Cardiovascular - normal exam  Rhythm: regular  Rate: normal  (-) murmur     Dental - normal exam           Pulmonary - normal exam  Breath sounds clear to auscultation     Abdominal    Neurological - normal exam                 Anesthesia Plan    ASA 3   ASA physical status 3 criteria: other (comment)    Plan - general       Airway plan will be ETT        Induction: intravenous    Postoperative Plan: Postoperative administration of opioids is intended.    Pertinent diagnostic labs and testing reviewed    Informed Consent:    Anesthetic plan and risks discussed with patient.    Use of blood products discussed with: patient whom consented to blood products.

## 2020-07-24 NOTE — H&P
Hospital Medicine History & Physical Note    Date of Service  7/24/2020    Primary Care Physician  Pcp Pt States None    Code Status  Full Code    Chief Complaint  Chief Complaint   Patient presents with   • Jaundice     Here stating he needs a new stent or to have it cleaned out, recent stent placement and DX of pancreatic cancer.       History of Presenting Illness  64 y.o. male with past medical history of pancreatic cancer with obstructive jaundice who presented 7/24/2020 with worsening jaundice.  He has a stent placed about a month ago.  He has been following with oncology to be started on chemotherapy soon.  But his jaundice continued to get worse and he was referred here to come to ER for further management.  In the ER GI was consulted and plan to have his stent replaced later today.    Review of Systems  Review of Systems   Constitutional: Negative for chills, fever and weight loss.   HENT: Negative for congestion and nosebleeds.    Eyes: Negative for blurred vision, pain, discharge and redness.   Respiratory: Negative for cough, sputum production, shortness of breath and stridor.    Cardiovascular: Negative for chest pain, palpitations and orthopnea.   Gastrointestinal: Negative for abdominal pain, diarrhea, heartburn, nausea and vomiting.   Genitourinary: Negative for dysuria, frequency and urgency.   Musculoskeletal: Negative for back pain, myalgias and neck pain.   Skin: Negative for itching and rash.   Neurological: Negative for dizziness, focal weakness, seizures and headaches.   Psychiatric/Behavioral: Negative for depression. The patient is not nervous/anxious and does not have insomnia.        Past Medical History   has a past medical history of Cancer (HCC) (2020), Dental disorder, Heart burn, Hypertension, Jaundice, and Seizure disorder (HCC).    Surgical History   has a past surgical history that includes pr ercp,diagnostic (6/22/2020); pr esophagoscopy,endoscopic ultrasnd (6/22/2020); elbow  arthroscopy; and cath placement (Left, 7/16/2020).     Family History  family history is not on file.     Social History   reports that he has been smoking cigarettes. He has a 12.50 pack-year smoking history. He has never used smokeless tobacco. He reports current alcohol use. He reports current drug use.    Allergies  No Known Allergies    Medications  Prior to Admission Medications   Prescriptions Last Dose Informant Patient Reported? Taking?   lisinopril (PRINIVIL) 10 MG Tab 7/22/2020 Patient Yes No   Sig: Take 10 mg by mouth every day. Indications: High Blood Pressure Disorder   pantoprazole (PROTONIX) 40 MG Tablet Delayed Response 7/22/2020 Patient Yes No   Sig: Take 40 mg by mouth every day.   tamsulosin (FLOMAX) 0.4 MG capsule 7/22/2020 Patient Yes No   Sig: Take 0.4 mg by mouth ONE-HALF HOUR AFTER BREAKFAST.      Facility-Administered Medications: None       Physical Exam  Temp:  [36.4 °C (97.5 °F)] 36.4 °C (97.5 °F)  Pulse:  [59-68] 59  Resp:  [16] 16  BP: (143-144)/(77-82) 144/77  SpO2:  [99 %-100 %] 100 %    Physical Exam  Vitals signs reviewed.   Constitutional:       General: He is not in acute distress.     Appearance: Normal appearance.   HENT:      Head: Normocephalic and atraumatic.      Nose: No congestion or rhinorrhea.   Eyes:      General: Scleral icterus present.      Extraocular Movements: Extraocular movements intact.      Pupils: Pupils are equal, round, and reactive to light.   Neck:      Musculoskeletal: Normal range of motion and neck supple.   Cardiovascular:      Rate and Rhythm: Normal rate and regular rhythm.      Pulses: Normal pulses.   Pulmonary:      Effort: Pulmonary effort is normal. No respiratory distress.      Breath sounds: Normal breath sounds.   Abdominal:      General: Bowel sounds are normal. There is no distension.      Palpations: Abdomen is soft.      Tenderness: There is no abdominal tenderness.   Musculoskeletal:         General: No swelling or tenderness.    Skin:     General: Skin is warm.      Findings: No erythema.   Neurological:      General: No focal deficit present.      Mental Status: He is alert and oriented to person, place, and time.         Laboratory:  Recent Labs     07/24/20 0812   WBC 5.2   RBC 3.43*   HEMOGLOBIN 11.5*   HEMATOCRIT 33.9*   MCV 98.8*   MCH 33.5*   MCHC 33.9   RDW 52.6*   PLATELETCT 272   MPV 10.4     Recent Labs     07/24/20 0812   SODIUM 135   POTASSIUM 4.5   CHLORIDE 105   CO2 23   GLUCOSE 103*   BUN 16   CREATININE 0.72   CALCIUM 8.8     Recent Labs     07/24/20 0812   ALTSGPT 111*   ASTSGOT 94*   ALKPHOSPHAT 169*   TBILIRUBIN 4.4*   LIPASE 37   GLUCOSE 103*     Recent Labs     07/24/20 0812   APTT 28.5   INR 1.04     No results for input(s): NTPROBNP in the last 72 hours.      No results for input(s): TROPONINT in the last 72 hours.    Imaging:  No orders to display         Assessment/Plan:      Obstructive jaundice due to malignant neoplasm (HCC)- (present on admission)  Assessment & Plan  With recent history of stent placement  GI consulted and plan to have a new stent placed today  NPO  Pain control as needed  Follow cmp in am    Elevated bilirubin- (present on admission)  Assessment & Plan  Along with liver enzymes elevation  Plan as above    Malignant neoplasm of head of pancreas (HCC)- (present on admission)  Assessment & Plan  To follow-up with oncology as an outpatient after discharge for chemotherapy    Essential hypertension- (present on admission)  Assessment & Plan  Continue lisinopril

## 2020-07-24 NOTE — CARE PLAN
Problem: Communication  Goal: The ability to communicate needs accurately and effectively will improve  Outcome: PROGRESSING AS EXPECTED  Intervention: [place] patient and significant other/support system to call light to alert staff of needs  Flowsheets (Taken 7/24/2020 8157)  Oriented to::   Call Light & Bedside Controls   Location of bathroom   Unit Routine     Problem: Knowledge Deficit  Goal: Knowledge of disease process/condition, treatment plan, diagnostic tests, and medications will improve  Outcome: PROGRESSING AS EXPECTED    Goal: Will remain free from falls  Outcome: PROGRESSING AS EXPECTED  Intervention: Implement fall precautions  Flowsheets (Taken 7/24/2020 2105)  Environmental Precautions:   Treaded Slipper Socks on Patient   Personal Belongings, Wastebasket, Call Bell etc. in Easy Reach   Bed in Low Position

## 2020-07-25 VITALS
BODY MASS INDEX: 18.85 KG/M2 | HEART RATE: 61 BPM | TEMPERATURE: 97.6 F | RESPIRATION RATE: 18 BRPM | WEIGHT: 142.2 LBS | DIASTOLIC BLOOD PRESSURE: 78 MMHG | OXYGEN SATURATION: 98 % | SYSTOLIC BLOOD PRESSURE: 154 MMHG | HEIGHT: 73 IN

## 2020-07-25 LAB
ALBUMIN SERPL BCP-MCNC: 3 G/DL (ref 3.2–4.9)
ALBUMIN/GLOB SERPL: 1.4 G/DL
ALP SERPL-CCNC: 149 U/L (ref 30–99)
ALT SERPL-CCNC: 101 U/L (ref 2–50)
ANION GAP SERPL CALC-SCNC: 9 MMOL/L (ref 7–16)
AST SERPL-CCNC: 84 U/L (ref 12–45)
BILIRUB SERPL-MCNC: 3.1 MG/DL (ref 0.1–1.5)
BUN SERPL-MCNC: 15 MG/DL (ref 8–22)
CALCIUM SERPL-MCNC: 8.6 MG/DL (ref 8.4–10.2)
CHLORIDE SERPL-SCNC: 107 MMOL/L (ref 96–112)
CO2 SERPL-SCNC: 21 MMOL/L (ref 20–33)
CREAT SERPL-MCNC: 0.66 MG/DL (ref 0.5–1.4)
ERYTHROCYTE [DISTWIDTH] IN BLOOD BY AUTOMATED COUNT: 52.6 FL (ref 35.9–50)
GLOBULIN SER CALC-MCNC: 2.2 G/DL (ref 1.9–3.5)
GLUCOSE SERPL-MCNC: 105 MG/DL (ref 65–99)
HCT VFR BLD AUTO: 31 % (ref 42–52)
HGB BLD-MCNC: 10.4 G/DL (ref 14–18)
MCH RBC QN AUTO: 33.7 PG (ref 27–33)
MCHC RBC AUTO-ENTMCNC: 33.5 G/DL (ref 33.7–35.3)
MCV RBC AUTO: 100.3 FL (ref 81.4–97.8)
PLATELET # BLD AUTO: 210 K/UL (ref 164–446)
PMV BLD AUTO: 10.8 FL (ref 9–12.9)
POTASSIUM SERPL-SCNC: 4.3 MMOL/L (ref 3.6–5.5)
PROT SERPL-MCNC: 5.2 G/DL (ref 6–8.2)
RBC # BLD AUTO: 3.09 M/UL (ref 4.7–6.1)
SARS-COV-2 RNA RESP QL NAA+PROBE: NOTDETECTED
SODIUM SERPL-SCNC: 137 MMOL/L (ref 135–145)
SPECIMEN SOURCE: NORMAL
WBC # BLD AUTO: 4.5 K/UL (ref 4.8–10.8)

## 2020-07-25 PROCEDURE — 36415 COLL VENOUS BLD VENIPUNCTURE: CPT

## 2020-07-25 PROCEDURE — 700105 HCHG RX REV CODE 258: Performed by: INTERNAL MEDICINE

## 2020-07-25 PROCEDURE — 85027 COMPLETE CBC AUTOMATED: CPT

## 2020-07-25 PROCEDURE — 700102 HCHG RX REV CODE 250 W/ 637 OVERRIDE(OP): Performed by: INTERNAL MEDICINE

## 2020-07-25 PROCEDURE — 99239 HOSP IP/OBS DSCHRG MGMT >30: CPT | Performed by: FAMILY MEDICINE

## 2020-07-25 PROCEDURE — 700111 HCHG RX REV CODE 636 W/ 250 OVERRIDE (IP): Performed by: INTERNAL MEDICINE

## 2020-07-25 PROCEDURE — 80053 COMPREHEN METABOLIC PANEL: CPT

## 2020-07-25 PROCEDURE — A9270 NON-COVERED ITEM OR SERVICE: HCPCS | Performed by: INTERNAL MEDICINE

## 2020-07-25 RX ORDER — ONDANSETRON 4 MG/1
4 TABLET, ORALLY DISINTEGRATING ORAL EVERY 4 HOURS PRN
Qty: 10 TAB | Refills: 0 | Status: SHIPPED | OUTPATIENT
Start: 2020-07-25 | End: 2020-09-09

## 2020-07-25 RX ADMIN — TAMSULOSIN HYDROCHLORIDE 0.4 MG: 0.4 CAPSULE ORAL at 11:17

## 2020-07-25 RX ADMIN — OMEPRAZOLE 20 MG: 20 CAPSULE, DELAYED RELEASE ORAL at 05:25

## 2020-07-25 RX ADMIN — HEPARIN SODIUM 5000 UNITS: 5000 INJECTION, SOLUTION INTRAVENOUS; SUBCUTANEOUS at 05:26

## 2020-07-25 RX ADMIN — HEPARIN SODIUM 5000 UNITS: 5000 INJECTION, SOLUTION INTRAVENOUS; SUBCUTANEOUS at 13:31

## 2020-07-25 RX ADMIN — LISINOPRIL 10 MG: 5 TABLET ORAL at 05:25

## 2020-07-25 RX ADMIN — SODIUM CHLORIDE: 9 INJECTION, SOLUTION INTRAVENOUS at 05:30

## 2020-07-25 ASSESSMENT — COGNITIVE AND FUNCTIONAL STATUS - GENERAL
DAILY ACTIVITIY SCORE: 24
SUGGESTED CMS G CODE MODIFIER MOBILITY: CH
SUGGESTED CMS G CODE MODIFIER DAILY ACTIVITY: CH
MOBILITY SCORE: 24

## 2020-07-25 NOTE — DISCHARGE INSTRUCTIONS
Discharge Instructions    Discharged to home by car with relative. Discharged via walking, hospital escort: Yes.  Special equipment needed: Not Applicable    Be sure to schedule a follow-up appointment with your primary care doctor or any specialists as instructed.     Discharge Plan:        I understand that a diet low in cholesterol, fat, and sodium is recommended for good health. Unless I have been given specific instructions below for another diet, I accept this instruction as my diet prescription.   Other diet: Soft diet    Special Instructions: None    · Is patient discharged on Warfarin / Coumadin?   No    PLEASE MAKE AN APPOINTMENT WITH DR GODFREY WITHIN 1 WEEK. TELP [phone]  Depression / Suicide Risk    As you are discharged from this [facility] facility, it is important to learn how to keep safe from harming yourself.    Recognize the warning signs:  · Abrupt changes in personality, positive or negative- including increase in energy   · Giving away possessions  · Change in eating patterns- significant weight changes-  positive or negative  · Change in sleeping patterns- unable to sleep or sleeping all the time   · Unwillingness or inability to communicate  · Depression  · Unusual sadness, discouragement and loneliness  · Talk of wanting to die  · Neglect of personal appearance   · Rebelliousness- reckless behavior  · Withdrawal from people/activities they love  · Confusion- inability to concentrate     If you or a loved one observes any of these behaviors or has concerns about self-harm, here's what you can do:  · Talk about it- your feelings and reasons for harming yourself  · Remove any means that you might use to hurt yourself (examples: pills, rope, extension cords, firearm)  · Get professional help from the community (Mental Health, Substance Abuse, psychological counseling)  · Do not be alone:Call your Safe Contact- someone whom you trust who will be there for you.  · Call your local CRISIS  HOTLINE 400-3297 or 347-968-7600  · Call your local Children's Mobile Crisis Response Team [place] (104) 813-2762 or www.[employer]  · Call the toll free National Suicide Prevention Hotlines   · National Suicide Prevention Lifeline 783-626-JFOC (9394)  · National [place] Line Network 800-SUICIDE (937-4610)

## 2020-07-25 NOTE — OR NURSING
1728- Per Dr. Wagner patient must wait at least 2.5 hours prior to eating.     1734- Patient arrived from Endo post ERCP (ENDOSCOPIC RETROGRADE CHOLANGIOPANCREATOGRAPHY). Respirations spontaneous and unlabored via oral airway. VSS, see flowsheets. Abdomen soft. Bowel sounds hypoactive.   1808- Patient waking up. Declines any pain or nausea at this time. Comfort measures in place. Additional time allotted for rest.   1814- O2 discontinued at this time.   1834- Patient continues to decline any pain or nausea. Patient meets criteria to transfer back to floor.

## 2020-07-25 NOTE — ANESTHESIA PROCEDURE NOTES
Airway    Date/Time: 7/24/2020 4:55 PM  Performed by: Jose Miguel Argueta M.D.  Authorized by: Jose Miguel Argueta M.D.     Location:  OR  Urgency:  Elective  Indications for Airway Management:  Anesthesia      Spontaneous Ventilation: absent    Sedation Level:  Deep  Preoxygenated: Yes    Patient Position:  Sniffing  Final Airway Type:  Endotracheal airway  Final Endotracheal Airway:  ETT  Cuffed: Yes    Technique Used for Successful ETT Placement:  Direct laryngoscopy    Insertion Site:  Oral  Blade Type:  Humza  Laryngoscope Blade/Videolaryngoscope Blade Size:  3  ETT Size (mm):  7.0  Measured from:  Teeth  ETT to Teeth (cm):  20  Placement Verified by: auscultation and capnometry    Cormack-Lehane Classification:  Grade I - full view of glottis  Number of Attempts at Approach:  1

## 2020-07-25 NOTE — PROGRESS NOTES
Report received from night shift RN. Assume care. Pt. AAOx4 pt is bed,  Assessment completed. VSS. Denies pain,  Pt was update for the care for the day. White board updated, All question answered. Pt has call light within reach,  bed is in the lowest position. Pt has no other needs at this time.   Pt want to go home today

## 2020-07-25 NOTE — CARE PLAN
Problem: Nutritional:  Goal: Achieve adequate nutritional intake  Description: Patient will consume 50% of meals/snacks  Outcome: PROGRESSING AS EXPECTED

## 2020-07-25 NOTE — DIETARY
"Nutrition services: Day 1 of admit.  Janes Cody is a 64 y.o. male with admitting DX of Obstructive jaundice  Consult received for low BMI    Spoke with pt at bedside. Pt reports losing ~30 lb over past 2 months d/t diarrhea ( lb). Pt reports low appetetie and food not tasting right, but he forced himself to eat (estimate <75% of normal x2 months). Appetite/taste now improved post stent placement last night. Pt agreeable to high protein snacks or protein shakes. High protein snacks ordered TID.    Assessment:  Height: 185.4 cm (6' 1\")  Weight: 64.5 kg (142 lb 3.2 oz)  Body mass index is 18.76 kg/m²., BMI classification: Underweight  Diet/Intake: Low Fiber, PO % x2 + snack     Evaluation:   1. Weight trend: 18.8% weight loss over 2 months (severe)    2. Pancreatic cancer (6/2020), stent placed last night. Expected to start chemo soon per chart review  3. Labs: Reviewed  4. Meds: Reviewed  5. Observed severe fat/muscle loss (depressed temples, sunken eyes)    Malnutrition Risk: Pt with severe malnutrition in the context of acute on chronic illness related to pancreatic cancer as evidenced by severe weight loss 18.8% over 2 months, reduced PO intake (<75% x2 months), malabsorption r/t ongoing diarrhea, severe fat/muscle loss observed in NFPE.     Recommendations/Plan:  1. High Protein snacks TID between meals   2. Encourage intake of meals  3. Document intake of all meals as % taken in ADL's to provide interdisciplinary communication across all shifts.   4. Monitor weight.  5. Nutrition rep will continue to see patient for ongoing meal and snack preferences.     RD following        "

## 2020-07-25 NOTE — PROGRESS NOTES
Pt resting in bed, A&Ox4, VSS, denies pain, nausea, dizziness, or sob. POC discussed, denies needs at this time. Safety measures and hourly rounding in place.

## 2020-07-25 NOTE — OP REPORT
DATE OF SERVICE: @      PROCEDURE PERFORMED:  Endoscopic retrograde cholangiography with:  Diagnostic  Balloon sweep  Stent removal / Stent placement    ALL RADIOLOGICAL IMAGES WERE INTERPRETED BY THE ENDOSCOPIST,  AT NO TIME WAS RADIOLOGIST PRESENT.    PHYSICIAN: Rufino Wagner M.D.    CONSENT:  Procedure risks and benefits reviewed thoroughly with the patient,   risks including but not limited to bleeding, perforation, side effects of   medication were informed.  Patient voiced understanding and agreed to proceed.  Additional risks inherent to ERCP that being mild, moderate, severe   pancreatitis that could lead to postprocedural pain, prolonged   hospitalization, intensive care unit stay, and/or death were reviewed with the   patient who voiced understanding and agreed to proceed.     PREPROCEDURE DIAGNOSIS:   Pancreatic adeno ca  Biliary obstruction   Elevated bilirubin    POSTPROCEDURE DIAGNOSIS:    Biliary stricture  Obstructed bile duct      DESCRIPTION OF PROCEDURE:  The patient was placed in a prone position after   intubation and sedation.  A bite block was inserted in the mouth and a side-viewing duodenoscope was passed carefully and easily under semi-direct visualization into the esophagus and passed through the stomach to the duodenum. Upon reaching the second portion of the duodenum, the scope was withdrawn to the short-position.The ampulla was identified. The ampulla appeared normal.    Obstructed stent was seen and removed with the snare easily.    Using a [employer] Rx44 sphincterotome preloaded with a 0.035 wire the CBD was cannulated.     Balloon sweep was done of moderately dilated CBD from bifurcation to distal CBD stricture. At stricture balloon could not traverse and had to be deflated.    A 9 cm 10 F stent was easily placed across the stricture into the CBD.         COMPLICATIONS:  None.     BLOOD LOSS:  None.     SPECIMENS:  None.    ASSESSMENT:  Bilary stricture and obstructed  stent     RECOMMENDATIONS:   Advance diet 2 hrs after ERCP  Check CBC and CMP in AM

## 2020-07-25 NOTE — CARE PLAN
Problem: Communication  Goal: The ability to communicate needs accurately and effectively will improve  Outcome: PROGRESSING AS EXPECTED     Problem: Safety  Goal: Will remain free from injury  Outcome: PROGRESSING AS EXPECTED  Goal: Will remain free from falls  Outcome: PROGRESSING AS EXPECTED     Problem: Infection  Goal: Will remain free from infection  Outcome: PROGRESSING AS EXPECTED     Problem: Venous Thromboembolism (VTW)/Deep Vein Thrombosis (DVT) Prevention:  Goal: Patient will participate in Venous Thrombosis (VTE)/Deep Vein Thrombosis (DVT)Prevention Measures  Outcome: PROGRESSING AS EXPECTED     Problem: Bowel/Gastric:  Goal: Will not experience complications related to bowel motility  Outcome: PROGRESSING AS EXPECTED     Problem: Discharge Barriers/Planning  Goal: Patient's continuum of care needs will be met  Outcome: PROGRESSING AS EXPECTED     Problem: Respiratory:  Goal: Respiratory status will improve  Outcome: PROGRESSING AS EXPECTED     Problem: Psychosocial Needs:  Goal: Level of anxiety will decrease  Outcome: PROGRESSING AS EXPECTED

## 2020-07-25 NOTE — CARE PLAN
Problem: Communication  Goal: The ability to communicate needs accurately and effectively will improve  Outcome: PROGRESSING AS EXPECTED  Intervention: [place] patient and significant other/support system to call light to alert staff of needs  Note: Pt uses call light appropriately for needs.     Problem: Safety  Goal: Will remain free from falls  Outcome: PROGRESSING AS EXPECTED  Intervention: Implement fall precautions  Flowsheets (Taken 7/24/2020 2000)  Environmental Precautions:   Treaded Slipper Socks on Patient   Personal Belongings, Wastebasket, Call Bell etc. in Easy Reach   Transferred to Stronger Side   Report Given to Other Health Care Providers Regarding Fall Risk   Bed in Low Position   Communication Sign for Patients & Families   Mobility Assessed & Appropriate Sign Placed

## 2020-07-25 NOTE — ANESTHESIA POSTPROCEDURE EVALUATION
Patient: Janes Cody    Procedure Summary     Date:  07/24/20 Room / Location:   ENDOSCOPIC ULTRASOUND ROOM / SURGERY [facility]    Anesthesia Start:  1655 Anesthesia Stop:      Procedures:       ERCP (ENDOSCOPIC RETROGRADE CHOLANGIOPANCREATOGRAPHY) (Abdomen)      ERCP, WITH CALCULUS REMOVAL      ERCP, WITH TUBE OR STENT INSERTION Diagnosis:  (same pending pathology)    Surgeon:  Rufino Wagner M.D. Responsible Provider:  Jose Miguel Argueta M.D.    Anesthesia Type:  general ASA Status:  3          Final Anesthesia Type: general  Last vitals  BP   Blood Pressure : 149/85    Temp   36.6 °C (97.9 °F)    Pulse   Pulse: 65   Resp   16    SpO2   99 %      Anesthesia Post Evaluation    Patient location during evaluation: PACU  Patient participation: complete - patient participated  Level of consciousness: awake and alert  Pain score: 0    Airway patency: patent  Anesthetic complications: no  Cardiovascular status: hemodynamically stable  Respiratory status: acceptable  Hydration status: euvolemic    PONV: none           Nurse Pain Score: 1 (NPRS)

## 2020-07-25 NOTE — PROGRESS NOTES
2 RN skin assessment complete. Healing surgical incision on L upper chest from port placement. Skin is otherwise intact.

## 2020-07-25 NOTE — ANESTHESIA TIME REPORT
Anesthesia Start and Stop Event Times     Date Time Event    7/24/2020 1233 Ready for Procedure     1655 Anesthesia Start     1735 Anesthesia Stop        Responsible Staff  07/24/20    Name Role Begin End    Jose Miguel Argueta M.D. Anesth 1655 173        Preop Diagnosis (Free Text):  Pre-op Diagnosis     Billary obstruction; pancreatic adeno CA        Preop Diagnosis (Codes):    Post op Diagnosis  Biliary obstruction      Premium Reason  A. 3PM - 7AM    Comments:

## 2020-07-25 NOTE — DISCHARGE SUMMARY
Discharge Summary    CHIEF COMPLAINT ON ADMISSION  Chief Complaint   Patient presents with   • Jaundice     Here stating he needs a new stent or to have it cleaned out, recent stent placement and DX of pancreatic cancer.       Reason for Admission  Jaundice     Admission Date  7/24/2020    CODE STATUS  Full Code    HPI & HOSPITAL COURSE  This is a 64 y.o. male who was recently diagnosed with adenocarcinoma of the head of the pancreas who recently also underwent stent placement almost a month ago comes in with jaundice.  Recently had Rjiade-j-Lxmz placed to start chemotherapy.  Upon arrival his LFTs noted to be elevated.  Total bilirubin was 4.4.  Patient underwent ERCP with stent exchange.  His bilirubin trended down.  He was hemodynamically clinically stable.  Afebrile.  GI cleared patient to be discharged and recommended to repeat liver function panel in a week which will be arranged through the GI office.  Patient was cleared for discharge from medical standpoint.       Therefore, he is discharged in guarded and stable condition to home with close outpatient follow-up.    The patient recovered much more quickly than anticipated on admission.    Discharge Date  7/25/2020    FOLLOW UP ITEMS POST DISCHARGE  Follow-up with PCP in 1 week  Follow-up with GI as per recommendations    DISCHARGE DIAGNOSES  Active Problems:    Obstructive jaundice due to malignant neoplasm (HCC) POA: Yes    Essential hypertension POA: Yes    Malignant neoplasm of head of pancreas (HCC) POA: Yes    Elevated bilirubin POA: Yes  Resolved Problems:    * No resolved hospital problems. *      FOLLOW UP  No future appointments.  Rufino Wagner M.D.  [address] [place] [address] [address] [id number]  [phone]    In 1 week        MEDICATIONS ON DISCHARGE     Medication List      START taking these medications      Instructions   ondansetron 4 MG Tbdp  Commonly known as:  ZOFRAN ODT   Take 1 Tab by mouth every four hours as needed for Nausea (give PO if IV  route is unavailable.).  Dose:  4 mg        CONTINUE taking these medications      Instructions   lisinopril 10 MG Tabs  Commonly known as:  PRINIVIL   Take 10 mg by mouth every day. Indications: High Blood Pressure Disorder  Dose:  10 mg     pantoprazole 40 MG Tbec  Commonly known as:  PROTONIX   Take 40 mg by mouth every day.  Dose:  40 mg     tamsulosin 0.4 MG capsule  Commonly known as:  FLOMAX   Take 0.4 mg by mouth ONE-HALF HOUR AFTER BREAKFAST.  Dose:  0.4 mg            Allergies  No Known Allergies    DIET  Orders Placed This Encounter   Procedures   • Diet Order Low Fiber(GI Soft)     Standing Status:   Standing     Number of Occurrences:   1     Order Specific Question:   Diet:     Answer:   Low Fiber(GI Soft) [2]       ACTIVITY  As tolerated.  Weight bearing as tolerated    CONSULTATIONS  GI    PROCEDURES  As mentioned above    LABORATORY  Lab Results   Component Value Date    SODIUM 137 07/25/2020    POTASSIUM 4.3 07/25/2020    CHLORIDE 107 07/25/2020    CO2 21 07/25/2020    GLUCOSE 105 (H) 07/25/2020    BUN 15 07/25/2020    CREATININE 0.66 07/25/2020        Lab Results   Component Value Date    WBC 4.5 (L) 07/25/2020    HEMOGLOBIN 10.4 (L) 07/25/2020    HEMATOCRIT 31.0 (L) 07/25/2020    PLATELETCT 210 07/25/2020        Total time of the discharge process exceeds 34 minutes.

## 2020-08-06 ENCOUNTER — TELEPHONE (OUTPATIENT)
Dept: ONCOLOGY | Facility: MEDICAL CENTER | Age: 65
End: 2020-08-06

## 2020-08-07 ENCOUNTER — OUTPATIENT INFUSION SERVICES (OUTPATIENT)
Dept: ONCOLOGY | Facility: MEDICAL CENTER | Age: 65
End: 2020-08-07
Attending: INTERNAL MEDICINE
Payer: MEDICARE

## 2020-08-07 DIAGNOSIS — C25.0 MALIGNANT NEOPLASM OF HEAD OF PANCREAS (HCC): ICD-10-CM

## 2020-08-07 PROCEDURE — 306780 HCHG STAT FOR TRANSFUSION PER CASE

## 2020-08-08 ENCOUNTER — TELEPHONE (OUTPATIENT)
Dept: ONCOLOGY | Facility: MEDICAL CENTER | Age: 65
End: 2020-08-08

## 2020-08-08 NOTE — PROGRESS NOTES
" Patient arrived ambulatory to [facility] for chemo education.  He is scheduled to receive FOLFIRINOX for pancreatic cancer next Wednesday.  Reviewed chemo education power point. \"Chemo and You\" booklet given.  Medication hand out provided and reviewed side effects.  Questions answered and pt verbalized understanding.  Tour of [facility] given and appointment schedule provided.  Pt ambulated out of clinic in no apparent distress.  "

## 2020-08-11 ENCOUNTER — OUTPATIENT INFUSION SERVICES (OUTPATIENT)
Dept: ONCOLOGY | Facility: MEDICAL CENTER | Age: 65
End: 2020-08-11
Attending: INTERNAL MEDICINE
Payer: MEDICARE

## 2020-08-11 VITALS
OXYGEN SATURATION: 97 % | SYSTOLIC BLOOD PRESSURE: 159 MMHG | TEMPERATURE: 98.2 F | WEIGHT: 150.35 LBS | RESPIRATION RATE: 18 BRPM | HEIGHT: 72 IN | HEART RATE: 66 BPM | BODY MASS INDEX: 20.36 KG/M2 | DIASTOLIC BLOOD PRESSURE: 70 MMHG

## 2020-08-11 DIAGNOSIS — C25.0 MALIGNANT NEOPLASM OF HEAD OF PANCREAS (HCC): ICD-10-CM

## 2020-08-11 LAB
ALBUMIN SERPL BCP-MCNC: 3.7 G/DL (ref 3.2–4.9)
ALBUMIN/GLOB SERPL: 1.7 G/DL
ALP SERPL-CCNC: 129 U/L (ref 30–99)
ALT SERPL-CCNC: 66 U/L (ref 2–50)
ANION GAP SERPL CALC-SCNC: 11 MMOL/L (ref 7–16)
AST SERPL-CCNC: 38 U/L (ref 12–45)
BASOPHILS # BLD AUTO: 0.9 % (ref 0–1.8)
BASOPHILS # BLD: 0.05 K/UL (ref 0–0.12)
BILIRUB SERPL-MCNC: 1.6 MG/DL (ref 0.1–1.5)
BUN SERPL-MCNC: 13 MG/DL (ref 8–22)
CALCIUM SERPL-MCNC: 9 MG/DL (ref 8.5–10.5)
CHLORIDE SERPL-SCNC: 104 MMOL/L (ref 96–112)
CO2 SERPL-SCNC: 24 MMOL/L (ref 20–33)
CREAT SERPL-MCNC: 0.74 MG/DL (ref 0.5–1.4)
EOSINOPHIL # BLD AUTO: 0.27 K/UL (ref 0–0.51)
EOSINOPHIL NFR BLD: 4.9 % (ref 0–6.9)
ERYTHROCYTE [DISTWIDTH] IN BLOOD BY AUTOMATED COUNT: 46.4 FL (ref 35.9–50)
GLOBULIN SER CALC-MCNC: 2.2 G/DL (ref 1.9–3.5)
GLUCOSE SERPL-MCNC: 138 MG/DL (ref 65–99)
HCT VFR BLD AUTO: 34 % (ref 42–52)
HGB BLD-MCNC: 11.6 G/DL (ref 14–18)
IMM GRANULOCYTES # BLD AUTO: 0.02 K/UL (ref 0–0.11)
IMM GRANULOCYTES NFR BLD AUTO: 0.4 % (ref 0–0.9)
LYMPHOCYTES # BLD AUTO: 1.57 K/UL (ref 1–4.8)
LYMPHOCYTES NFR BLD: 28.4 % (ref 22–41)
MAGNESIUM SERPL-MCNC: 1.7 MG/DL (ref 1.5–2.5)
MCH RBC QN AUTO: 34 PG (ref 27–33)
MCHC RBC AUTO-ENTMCNC: 34.1 G/DL (ref 33.7–35.3)
MCV RBC AUTO: 99.7 FL (ref 81.4–97.8)
MONOCYTES # BLD AUTO: 0.35 K/UL (ref 0–0.85)
MONOCYTES NFR BLD AUTO: 6.3 % (ref 0–13.4)
NEUTROPHILS # BLD AUTO: 3.26 K/UL (ref 1.82–7.42)
NEUTROPHILS NFR BLD: 59.1 % (ref 44–72)
NRBC # BLD AUTO: 0 K/UL
NRBC BLD-RTO: 0 /100 WBC
PLATELET # BLD AUTO: 241 K/UL (ref 164–446)
PMV BLD AUTO: 9.8 FL (ref 9–12.9)
POTASSIUM SERPL-SCNC: 4.2 MMOL/L (ref 3.6–5.5)
PROT SERPL-MCNC: 5.9 G/DL (ref 6–8.2)
RBC # BLD AUTO: 3.41 M/UL (ref 4.7–6.1)
SODIUM SERPL-SCNC: 139 MMOL/L (ref 135–145)
WBC # BLD AUTO: 5.5 K/UL (ref 4.8–10.8)

## 2020-08-11 PROCEDURE — 36415 COLL VENOUS BLD VENIPUNCTURE: CPT

## 2020-08-11 PROCEDURE — 80053 COMPREHEN METABOLIC PANEL: CPT

## 2020-08-11 PROCEDURE — 85025 COMPLETE CBC W/AUTO DIFF WBC: CPT

## 2020-08-11 PROCEDURE — 83735 ASSAY OF MAGNESIUM: CPT

## 2020-08-11 RX ORDER — HEPARIN SODIUM (PORCINE) LOCK FLUSH IV SOLN 100 UNIT/ML 100 UNIT/ML
500 SOLUTION INTRAVENOUS PRN
Status: CANCELLED | OUTPATIENT
Start: 2020-08-11

## 2020-08-11 RX ORDER — 0.9 % SODIUM CHLORIDE 0.9 %
20 VIAL (ML) INJECTION PRN
Status: CANCELLED | OUTPATIENT
Start: 2020-08-11

## 2020-08-11 ASSESSMENT — FIBROSIS 4 INDEX: FIB4 SCORE: 2.59

## 2020-08-11 NOTE — PROGRESS NOTES
"Pharmacy chemotherapy Calculation:     Dx: pancreatic cancer      Cycle 1    Previous treatment = none    Protocol: FOLFIRINOX     *Dosing Reference*  oxaliplatin 85mg/m2 IV over 2  Hours on day 1  Irinotecan 180mg/m2 IV over 90 min on day 1  - 8/12/20- dose reduce to 135mg/m2(25% reduction) for the dose on 8/12/20 only  Leucovorin 400mg/m2 IV over 90 min concurrent with irinotecan on day 1  -deleted for tolerance  Fluorouracil 1200mg/m2 CIVI over 24 hrs on days 1 and 2(2400mg/m2 IV over 46 hours)  NCCN Guidelines for Pancreatic Adenocarcinoma V.2.2016  q14 day cycle x 4-12 cycles or until DP/UT(metastatic)  Ronald NGUYEN, et al - N Engl J Med. 2011 May 12;364(02):1810-09. doi: 10.1056/[id number].    Allergies: No Known Allergies         BP (!) 172/84   Pulse (!) 57   Temp 36.7 °C (98 °F) (Temporal)   Resp 18   Ht 1.82 m (5' 11.65\")   Wt 68.5 kg (151 lb 0.2 oz)   SpO2 96%   BMI 20.68 kg/m²     Body surface area is 1.86 meters squared.      All lab results 8/10/20 within treatment parameters. Except Tbili > 1.5mg/dL  MD aware of all current lab results. Orders received to proceed with treatment with a 25 % dose reduction(from full dose of 180mg/m2) for irinotecan only on 8/12/20 only as d/w Dr. ADAN Iqbal. Pt to follow up with Dr. Iqbal for re-evaluation prior to next cycle.          oxaliplatin 85mg/m2 x 1.86m2 = 158.1mg    <10% difference, ok to treat with final dose = 158.1mg IV    Irinotecan 135mg/m2 x 1.86m2 = 251mg    <10% difference, ok to treat with final dose = 251.2mg IV    Leucovorin 400mg/m2 x 1.86m2 = 744mg    <10% difference, ok to treat with final dose = 744mg IV      Fluorouracil 2400mg/m2 x 1.86m2 = 4464mg    <10% difference, ok to treat with final dose = 4465mg    Via CADD pump for home infusion at 1.9mL/hr over 46 hours    MARILU Garcia PharmJANET.    "

## 2020-08-11 NOTE — PROGRESS NOTES
Pt arrives to [facility] for pre-chemotherapy labs for first cycle of chemotherapy.  Pt reports mid abdominal pain 5/10 today.  Pt denies nausea.  Labs drawn via 23g butterfly needle to L-AC.  Confirmed tomorrow's appt with pt.  Pt dc home to self care.

## 2020-08-12 ENCOUNTER — PATIENT OUTREACH (OUTPATIENT)
Dept: OTHER | Facility: MEDICAL CENTER | Age: 65
End: 2020-08-12

## 2020-08-12 ENCOUNTER — DOCUMENTATION (OUTPATIENT)
Dept: NUTRITION | Facility: MEDICAL CENTER | Age: 65
End: 2020-08-12

## 2020-08-12 ENCOUNTER — OUTPATIENT INFUSION SERVICES (OUTPATIENT)
Dept: ONCOLOGY | Facility: MEDICAL CENTER | Age: 65
End: 2020-08-12
Attending: INTERNAL MEDICINE
Payer: MEDICARE

## 2020-08-12 VITALS
WEIGHT: 151.01 LBS | RESPIRATION RATE: 18 BRPM | SYSTOLIC BLOOD PRESSURE: 172 MMHG | TEMPERATURE: 98 F | HEIGHT: 72 IN | BODY MASS INDEX: 20.45 KG/M2 | DIASTOLIC BLOOD PRESSURE: 84 MMHG | OXYGEN SATURATION: 96 % | HEART RATE: 57 BPM

## 2020-08-12 DIAGNOSIS — C25.0 MALIGNANT NEOPLASM OF HEAD OF PANCREAS (HCC): ICD-10-CM

## 2020-08-12 PROBLEM — C25.9 PANCREATIC CANCER (HCC): Status: ACTIVE | Noted: 2020-08-12

## 2020-08-12 LAB — CANCER AG19-9 SERPL-ACNC: 288 U/ML (ref 0–35)

## 2020-08-12 PROCEDURE — 96415 CHEMO IV INFUSION ADDL HR: CPT

## 2020-08-12 PROCEDURE — A4212 NON CORING NEEDLE OR STYLET: HCPCS

## 2020-08-12 PROCEDURE — 96368 THER/DIAG CONCURRENT INF: CPT

## 2020-08-12 PROCEDURE — 86301 IMMUNOASSAY TUMOR CA 19-9: CPT

## 2020-08-12 PROCEDURE — 96413 CHEMO IV INFUSION 1 HR: CPT

## 2020-08-12 PROCEDURE — 96375 TX/PRO/DX INJ NEW DRUG ADDON: CPT

## 2020-08-12 PROCEDURE — 700105 HCHG RX REV CODE 258: Performed by: INTERNAL MEDICINE

## 2020-08-12 PROCEDURE — 96417 CHEMO IV INFUS EACH ADDL SEQ: CPT

## 2020-08-12 PROCEDURE — 700101 HCHG RX REV CODE 250: Performed by: INTERNAL MEDICINE

## 2020-08-12 PROCEDURE — 700111 HCHG RX REV CODE 636 W/ 250 OVERRIDE (IP): Performed by: INTERNAL MEDICINE

## 2020-08-12 PROCEDURE — G0498 CHEMO EXTEND IV INFUS W/PUMP: HCPCS

## 2020-08-12 RX ORDER — LOPERAMIDE HYDROCHLORIDE 2 MG/1
4 CAPSULE ORAL PRN
Status: CANCELLED | OUTPATIENT
Start: 2020-08-12

## 2020-08-12 RX ORDER — 0.9 % SODIUM CHLORIDE 0.9 %
VIAL (ML) INJECTION PRN
Status: CANCELLED | OUTPATIENT
Start: 2020-08-12

## 2020-08-12 RX ORDER — HEPARIN SODIUM (PORCINE) LOCK FLUSH IV SOLN 100 UNIT/ML 100 UNIT/ML
500 SOLUTION INTRAVENOUS PRN
Status: CANCELLED | OUTPATIENT
Start: 2020-08-14

## 2020-08-12 RX ORDER — PROCHLORPERAZINE MALEATE 10 MG
10 TABLET ORAL EVERY 6 HOURS PRN
Status: CANCELLED | OUTPATIENT
Start: 2020-08-12

## 2020-08-12 RX ORDER — 0.9 % SODIUM CHLORIDE 0.9 %
20 VIAL (ML) INJECTION PRN
Status: CANCELLED | OUTPATIENT
Start: 2020-08-14

## 2020-08-12 RX ORDER — HEPARIN SODIUM (PORCINE) LOCK FLUSH IV SOLN 100 UNIT/ML 100 UNIT/ML
500 SOLUTION INTRAVENOUS PRN
Status: CANCELLED | OUTPATIENT
Start: 2020-08-12

## 2020-08-12 RX ORDER — LOPERAMIDE HYDROCHLORIDE 2 MG/1
2 CAPSULE ORAL
Status: CANCELLED | OUTPATIENT
Start: 2020-08-12

## 2020-08-12 RX ORDER — ONDANSETRON 8 MG/1
8 TABLET, ORALLY DISINTEGRATING ORAL PRN
Status: CANCELLED | OUTPATIENT
Start: 2020-08-12

## 2020-08-12 RX ORDER — 0.9 % SODIUM CHLORIDE 0.9 %
3 VIAL (ML) INJECTION PRN
Status: CANCELLED | OUTPATIENT
Start: 2020-08-12

## 2020-08-12 RX ORDER — 0.9 % SODIUM CHLORIDE 0.9 %
10 VIAL (ML) INJECTION PRN
Status: CANCELLED | OUTPATIENT
Start: 2020-08-12

## 2020-08-12 RX ORDER — METHYLPREDNISOLONE SODIUM SUCCINATE 125 MG/2ML
125 INJECTION, POWDER, LYOPHILIZED, FOR SOLUTION INTRAMUSCULAR; INTRAVENOUS PRN
Status: CANCELLED | OUTPATIENT
Start: 2020-08-12

## 2020-08-12 RX ORDER — DIPHENHYDRAMINE HYDROCHLORIDE 50 MG/ML
50 INJECTION INTRAMUSCULAR; INTRAVENOUS PRN
Status: CANCELLED | OUTPATIENT
Start: 2020-08-12

## 2020-08-12 RX ORDER — DEXTROSE MONOHYDRATE 50 MG/ML
INJECTION, SOLUTION INTRAVENOUS CONTINUOUS
Status: CANCELLED | OUTPATIENT
Start: 2020-08-12

## 2020-08-12 RX ORDER — ONDANSETRON 2 MG/ML
4 INJECTION INTRAMUSCULAR; INTRAVENOUS PRN
Status: CANCELLED | OUTPATIENT
Start: 2020-08-12

## 2020-08-12 RX ORDER — MAGNESIUM SULFATE 1 G/100ML
1 INJECTION INTRAVENOUS ONCE
Status: COMPLETED | OUTPATIENT
Start: 2020-08-12 | End: 2020-08-12

## 2020-08-12 RX ORDER — EPINEPHRINE 1 MG/ML(1)
0.5 AMPUL (ML) INJECTION PRN
Status: CANCELLED | OUTPATIENT
Start: 2020-08-12

## 2020-08-12 RX ADMIN — DEXAMETHASONE SODIUM PHOSPHATE 12 MG: 4 INJECTION, SOLUTION INTRA-ARTICULAR; INTRALESIONAL; INTRAMUSCULAR; INTRAVENOUS; SOFT TISSUE at 11:40

## 2020-08-12 RX ADMIN — LEUCOVORIN CALCIUM 744 MG: 350 INJECTION, POWDER, LYOPHILIZED, FOR SUSPENSION INTRAMUSCULAR; INTRAVENOUS at 14:50

## 2020-08-12 RX ADMIN — LIDOCAINE HYDROCHLORIDE 0.5 ML: 10 INJECTION, SOLUTION EPIDURAL; INFILTRATION; INTRACAUDAL at 11:10

## 2020-08-12 RX ADMIN — ONDANSETRON 16 MG: 2 INJECTION INTRAMUSCULAR; INTRAVENOUS at 11:55

## 2020-08-12 RX ADMIN — OXALIPLATIN 158.1 MG: 100 INJECTION, SOLUTION, CONCENTRATE INTRAVENOUS at 12:30

## 2020-08-12 RX ADMIN — IRINOTECAN HYDROCHLORIDE 251.2 MG: 20 INJECTION, SOLUTION INTRAVENOUS at 14:50

## 2020-08-12 RX ADMIN — MAGNESIUM SULFATE 1 G: 1 INJECTION INTRAVENOUS at 12:31

## 2020-08-12 RX ADMIN — ATROPINE SULFATE 0.5 MG: 1 INJECTION, SOLUTION INTRAMUSCULAR; INTRAVENOUS; SUBCUTANEOUS at 14:40

## 2020-08-12 RX ADMIN — FLUOROURACIL 4465 MG: 50 INJECTION, SOLUTION INTRAVENOUS at 16:30

## 2020-08-12 ASSESSMENT — FIBROSIS 4 INDEX: FIB4 SCORE: 1.26

## 2020-08-12 NOTE — PROGRESS NOTES
"On 8-12-20, Oncology Social Worker, Minna Caceres, met with pt in person to follow up on referral/distress score of 6, relating to; transportation, housing, insurance/financials. NOE Caceres introduced self as part of pt's social support team. NOE Caceres inquired on pt needs and/or questions.   Pt reports living in [facility] with pt's brother. Recently, pt came to [place] for tx and is currently staying with daughter, daughter's spouse, and two grandchildren. Pt reports needing somewhere to stay at times due to crowding and pt's daughter requesting pt getting a motel to stay when other family members come into town.   Pt reports his only income is $1027.00 from social security. Pt reports financials being an issue and \"trying to survive off very little.\"  ONE Caceres offered to provide resources for low-income housing and [facility]. Pt verbalized agreement.  Pt informed NOE Caceres that pt needs a place to go while in tx this week due to daughter's request.     NOE Caceres provided low income housing resources and F application. Pt filled out RCF application.  NOE Caceres presented application to Alana Bentley, Director of [facility].  Director Mc authorized pt's stay at [facility] for two nights (8-14 and 8-).   NOE Caceres contacted Celena at [facility] and made reservations for 8-14-20 and 8-15-20; confirmation # [id number].  NOE Caceres met with pt and provided information of application being accepted by [facility], for the [facility] for two nights.  NOE Caceres provided resources for food patel, [employer], [employer], and [employer]  Pt expressed gratitude and thanked [facility] and NOE Caceres for assistance.   NOE Caceres thanked pt for his time, provided business card, and encouraged pt to contact for questions and/or concerns.  "

## 2020-08-12 NOTE — PROGRESS NOTES
"Pharmacy Chemotherapy Calculation    Patient Name: Janes Cody   Dx: Pancreatic Cancer  Protocol: mFOLFIRINOX     *Dosing Reference*  Oxaliplatin 85 mg/m2 IV over 2 hours on Day 1 followed by  Irinotecan 150 mg/m2 IV over 90 minutes on Day 1 given concurrently with    8/12/20 dose reduced to 135 mg/m2 IV per MD due to T.Bili per Dr. Iqbal only for C1  Leucovorin 400 mg/m2 IV over 90 minutes on Day 1 followed by   Fluorouracil 2400 mg/m2 CIVI over 46 hours    14 day cycle for 12 cycles  Ronald NGUYEN et al. N Engl J Med. 56325;364(80):181-80    Allergies:  Patient has no known allergies.     BP (!) 172/84   Pulse (!) 57   Temp 36.7 °C (98 °F) (Temporal)   Resp 18   Ht 1.82 m (5' 11.65\")   Wt 68.5 kg (151 lb 0.2 oz)   SpO2 96%   BMI 20.68 kg/m²  Body surface area is 1.86 meters squared.    Labs 8/11/20:  ANC~ 3260 Plt = 241k   Hgb = 11.6     SCr = 0.74 mg/dL CrCl ~ 96 mL/min   AST/ALT/ALK = 38/66/129 TBili = 1.6    *MD aware of current labs, okay to proceed with treatment today, dose adjusting irinotecan for C1*     Drug Order   (Drug name, dose, route, IV Fluid & volume, frequency, number of doses) Cycle 1      Previous treatment: n/a     Medication = Oxaliplatin (Eloxatin)  Base Dose= 85 mg/m2  Calc Dose: Base Dose x 1.86 m2 = 158.1 mg  Final Dose = 158.1 mg  Route = IV  Fluid & Volume = D5W 250 mL  Admin Duration = Over 2 hours          <10% difference, okay to treat with final dose   Medication = Irinotecan (Camptosar)  Base Dose= 135 mg/m2  Calc Dose: Base Dose x 1.86 m2 = 251.1 mg  Final Dose = 251.2 mg  Route = IV  Fluid & Volume = D5W 500 mL  Admin Duration = Over 90 minutes   Give concurrently with Leucovorin       <10% difference, okay to treat with final dose   Medication = Leucovorin   Base Dose= 400 mg/m2  Calc Dose:Base Dose x 1.86 m2 = 744 mg  Final Dose = 744 mg  Route = IV  Fluid & Volume = D5W 250 mL  Admin Duration = Over 90 minutes   Give concurrently with Irinotecan        <10% " difference, okay to treat with final dose   Medication = Fluorouracil   Base Dose= 2400 mg/m2   Calc Dose: Base Dose x 1.86 m2 = 4464 mg  Final Dose = 4465 mg  Route = CIVI  Fluid & Volume = 89.3 mL (+ 3 mL of overfill = 92.3 mL)  Conc. = 50 mg/mL  Admin Duration = Over 46 hours @ 1.9 mL/hr   Via CADD pump for home infusion       <10% difference, okay to treat with final dose     By my signature below, I confirm this process was performed independently with the BSA and all final chemotherapy dosing calculations congruent. I have reviewed the above chemotherapy order and that my calculation of the final dose and BSA (when applicable) corroborate those calculations of the  pharmacist. Discrepancies of 10% or greater in the written dose have been addressed and documented within the EPIC Progress notes.    Yael Blanchard, PharmD

## 2020-08-12 NOTE — PROGRESS NOTES
Chemotherapy Verification - PRIMARY RN      Height = 71.65 in  Weight = 151 lb  BSA = 1.86 m2       Medication: Oxaliplatin  Dose: 85 mg/m2  Calculated Dose: 158.1 mg                             (In mg/m2, AUC, mg/kg)     Medication: Irinotecan  Dose: 135 mg/m2  Calculated Dose: 251.1 mg                             (In mg/m2, AUC, mg/kg)    Medication: 5 FU CADD Pump  Dose: 2400 mg/m2  Calculated Dose: 4464 mg / 46 hrs of continuous infusion                            (In mg/m2, AUC, mg/kg)    I confirm this process was performed independently with the BSA and all final chemotherapy dosing calculations congruent.  Any discrepancies of 10% or greater have been addressed with the chemotherapy pharmacist. The resolution of the discrepancy has been documented in the EPIC progress notes.

## 2020-08-12 NOTE — PROGRESS NOTES
Chemotherapy Verification - SECONDARY RN       Height = 71.65 inches  Weight = 68.5 kg  BSA = 1.86 m2       Medication: Oxaliplatin (Eloxatin)  Dose: 85 mg/m2  Calculated Dose: 158.1 mg                             (In mg/m2, AUC, mg/kg)     Medication: Irinotecan (Camptosar)  Dose: 135 mg/m2  Calculated Dose: 251.1 mg                             (In mg/m2, AUC, mg/kg)    Medication: Fluorouracil (Adrucil)  Dose: 2400 mg/m2  Calculated Dose: 4,464 mg in CADD pump for 46 hour infusion                             (In mg/m2, AUC, mg/kg)      I confirm that this process was performed independently.

## 2020-08-12 NOTE — PROGRESS NOTES
into [facility] for Cycle 1 of FOLFIRINOX  for Malignant neoplasm of head of pancreas. Pt denied having any new or acute complaints today. Port accessed in a sterile manner, had + blood return, flushed briskly. Pt given anticancer therapy as prescribed, tolerated well, denied having any complaints during or after infusion. Connected to CADD PUMP at 1630. Port had + blood return after, flushed per [facility] policy, left accessed, for 46 hrs.of continuous home infusion. Discharge home to self care in [facility]. Appointment confirm for next treatment.

## 2020-08-12 NOTE — PROGRESS NOTES
"Nutrition Services: RD Consultation/ New chemotherapy Start  64 year old male with diagnosis of pancreatic cancer.       Past Medical History:   Diagnosis Date   • Cancer (HCC) 2020    pancreatic   • Dental disorder     full upper and lower   • Heart burn    • Hypertension    • Jaundice    • Seizure disorder (HCC)     had a couple seizures once but not on meds and never dx with anything       RD met with pt to assess current intake, appetite, and nutritional status.  Pt presents to appointment unaccompanied. Pt states has lost at least 30 lbs since June as a result of his diease. Mentions lately has been \"eating like crazy\" and has been able to maintain his weight around 142 lbs. Mentions eating things like salads, meat, potatoes, Muscle Milk shakes, and drinking water. States will also have a beer sometimes, wonders if this is okay. Denies n/v/d/c, states stool has been normal and dark in color. Denies bloating, gas, or abdominal cramping. Does mention a dull ache in abdominal area that stays relatively consistent. States eating does seem to make it feel a bit better sometimes. Pt did not express any further nutrition-related questions or concerns at this time.     Assessment:  • Pertinent Labs: Glucose 138, ALT 66, alkaline phosphatase 129,   • Pertinent Meds: camptosar, adrucil, zofran, oxycodone, eloxatin   • Weight: 68.5 kg/ 151 lbs   • Height: 5'11\"  • BMI: 20.6  • WNL BMI classification     Weight History from Chart:  154.54 lbs/ 70.2 kg on 6/22/20    Weight Change/Malnutrition Risk: Per chart review, pt appears to have lost 1.7 kg, which is a 2.4% loss in 1 month. Pt reports 30 lb weight loss since June 2020, though not enough weight history to support at this time. Pt appears adequately nourished. Pt at increased risk for malnutrition given dx of pancreatic cancer increases demand for nutrients related to hypermetabolic status.    Interventions:  • Introduced self and discussed role of dietitian throughout " treatment process   • Provided and discussed handout regarding general nutrition guidelines as well as nutritional recommendations for patient's with pancreatic cancer, including tips/tricks should side effects of tx occur.   • Encouraged balanced diet with plant-based focus. Verbalized importance of nutrition and maintaining LBM throughout treatment.   • Increase meal and snack frequency to 4-6 x/day.  • Reviewed pancreatic insufficiency and signs to look for. Per RD opinion, pt does not appear to be experiencing pancreatic insufficiency at this time.   • Focus on high calorie and protein foods, fruits and vegetables with all meals/snacks - handout provided.  • Supported use of supplemental shakes such as Muscle milk, discussed addition of fruit and milk for added calories   • Advised against alcohol consumption during treatment, especially with regard to high risk dx.     Pt reports understanding and was receptive to information provided.   RD provided contact information. Encouraged pt to reach out as questions/concerns arise.   RD following and to make further recommendations as indicated.  [phone]

## 2020-08-14 ENCOUNTER — OUTPATIENT INFUSION SERVICES (OUTPATIENT)
Dept: ONCOLOGY | Facility: MEDICAL CENTER | Age: 65
End: 2020-08-14
Attending: INTERNAL MEDICINE
Payer: MEDICARE

## 2020-08-14 VITALS
BODY MASS INDEX: 21.27 KG/M2 | HEIGHT: 71 IN | OXYGEN SATURATION: 97 % | RESPIRATION RATE: 18 BRPM | HEART RATE: 94 BPM | WEIGHT: 151.9 LBS | TEMPERATURE: 97.5 F | DIASTOLIC BLOOD PRESSURE: 71 MMHG | SYSTOLIC BLOOD PRESSURE: 114 MMHG

## 2020-08-14 DIAGNOSIS — C25.0 MALIGNANT NEOPLASM OF HEAD OF PANCREAS (HCC): ICD-10-CM

## 2020-08-14 PROCEDURE — 96523 IRRIG DRUG DELIVERY DEVICE: CPT

## 2020-08-14 PROCEDURE — 700111 HCHG RX REV CODE 636 W/ 250 OVERRIDE (IP)

## 2020-08-14 RX ORDER — HEPARIN SODIUM (PORCINE) LOCK FLUSH IV SOLN 100 UNIT/ML 100 UNIT/ML
500 SOLUTION INTRAVENOUS PRN
Status: DISCONTINUED | OUTPATIENT
Start: 2020-08-14 | End: 2020-08-14 | Stop reason: HOSPADM

## 2020-08-14 RX ORDER — HEPARIN SODIUM (PORCINE) LOCK FLUSH IV SOLN 100 UNIT/ML 100 UNIT/ML
SOLUTION INTRAVENOUS
Status: COMPLETED
Start: 2020-08-14 | End: 2020-08-14

## 2020-08-14 RX ADMIN — HEPARIN SODIUM (PORCINE) LOCK FLUSH IV SOLN 100 UNIT/ML 500 UNITS: 100 SOLUTION at 16:12

## 2020-08-14 RX ADMIN — HEPARIN 500 UNITS: 100 SYRINGE at 16:12

## 2020-08-14 ASSESSMENT — FIBROSIS 4 INDEX: FIB4 SCORE: 1.26

## 2020-08-14 NOTE — PROGRESS NOTES
Pt ambulatory to hospitals for CADD pump de-access.  Total volume infused on CADD pump 90.40, volume remaining = 0.  PORT flushed per protocol with NS, brisk blood return observed, heparin instilled.  PORT de-accessed, needle intact, gauze and tape to site.  Pt tolerated well.  Pt discharged to self care in [facility], next appointment confirmed for tomorrow for Udenyca injection.

## 2020-08-15 ENCOUNTER — OUTPATIENT INFUSION SERVICES (OUTPATIENT)
Dept: ONCOLOGY | Facility: MEDICAL CENTER | Age: 65
End: 2020-08-15
Attending: INTERNAL MEDICINE
Payer: MEDICARE

## 2020-08-15 VITALS
SYSTOLIC BLOOD PRESSURE: 134 MMHG | TEMPERATURE: 97.8 F | WEIGHT: 151.9 LBS | BODY MASS INDEX: 21.27 KG/M2 | DIASTOLIC BLOOD PRESSURE: 83 MMHG | HEART RATE: 78 BPM | OXYGEN SATURATION: 98 % | HEIGHT: 71 IN | RESPIRATION RATE: 18 BRPM

## 2020-08-15 DIAGNOSIS — C25.0 MALIGNANT NEOPLASM OF HEAD OF PANCREAS (HCC): ICD-10-CM

## 2020-08-15 PROCEDURE — 96372 THER/PROPH/DIAG INJ SC/IM: CPT

## 2020-08-15 PROCEDURE — 700111 HCHG RX REV CODE 636 W/ 250 OVERRIDE (IP): Mod: TB | Performed by: INTERNAL MEDICINE

## 2020-08-15 RX ADMIN — PEGFILGRASTIM-CBQV 6 MG: 6 INJECTION, SOLUTION SUBCUTANEOUS at 17:27

## 2020-08-15 ASSESSMENT — FIBROSIS 4 INDEX: FIB4 SCORE: 1.26

## 2020-08-15 NOTE — PROGRESS NOTES
Pt no call/no show for Udenyca appointment. Call placed to patient, no answer. Message left. Call placed to [facility] where patient is currently residing, no answer at desk.

## 2020-08-16 NOTE — PROGRESS NOTES
Patient arrived [facility] for U[facility]ca.  Reviewed plan of care and medication side effects.  He verbalized understanding.  He denies any nausea, s/s of infection, or fevers.  Udenyca given to right back arm, band aid placed.  Confirmed next appointment and he ambulated out of clinic in no apparent distress.

## 2020-08-17 ENCOUNTER — PATIENT OUTREACH (OUTPATIENT)
Dept: OTHER | Facility: MEDICAL CENTER | Age: 65
End: 2020-08-17

## 2020-08-17 NOTE — PROGRESS NOTES
On 8-17-20, Oncology Social Worker, Minna Caceres, followed up on pt request for lodging from 8-24-20 to 8-30-20. NOE Caceres met with [facility] Director, Alana Bentley. Pt's request was approved.  [facility] Director Mc sent email confirming payment to [facility]. NOE Caceres contacted Jordana with [facility] and made reservations on pt's behalf; confirmation # [id number].  NOE Caceres contacted pt via phone. NOE Caceres informed pt his request was approved and provided confirmation number. Pt offered thanks to [facility] and NOE Caceres.  NOE Caceres thanked pt for his time and communicating his needs.

## 2020-08-17 NOTE — PROGRESS NOTES
On 8-16-20, Oncology Social Worker, Minna Caceres, received incoming phone message from pt. Pt reports checking out of the Inn at [facility] and expressing gratitude. Pt then left a request for lodging from 8-24-20 to 8-30-20, while receiving tx.  NOE Caceres will follow up with F for pt's request.

## 2020-08-21 ENCOUNTER — PATIENT OUTREACH (OUTPATIENT)
Dept: OTHER | Facility: MEDICAL CENTER | Age: 65
End: 2020-08-21

## 2020-08-21 NOTE — PROGRESS NOTES
SHADI placed call to patient to follow up on referral received.  Patient has been working really close with OSW Minna and he has been supported by the [facility] for lodging.  He reports that now he is driving to [place] for the  he recently just lost his brother.  SHADI told the patient that I would meet him face to face at his next appointment in [facility].  Patient agreed.

## 2020-08-24 RX ORDER — 0.9 % SODIUM CHLORIDE 0.9 %
10 VIAL (ML) INJECTION PRN
Status: CANCELLED | OUTPATIENT
Start: 2020-08-25

## 2020-08-24 RX ORDER — 0.9 % SODIUM CHLORIDE 0.9 %
3 VIAL (ML) INJECTION PRN
Status: CANCELLED | OUTPATIENT
Start: 2020-08-25

## 2020-08-24 RX ORDER — 0.9 % SODIUM CHLORIDE 0.9 %
VIAL (ML) INJECTION PRN
Status: CANCELLED | OUTPATIENT
Start: 2020-08-25

## 2020-08-24 RX ORDER — HEPARIN SODIUM (PORCINE) LOCK FLUSH IV SOLN 100 UNIT/ML 100 UNIT/ML
500 SOLUTION INTRAVENOUS PRN
Status: CANCELLED | OUTPATIENT
Start: 2020-08-25

## 2020-08-25 ENCOUNTER — OUTPATIENT INFUSION SERVICES (OUTPATIENT)
Dept: ONCOLOGY | Facility: MEDICAL CENTER | Age: 65
End: 2020-08-25
Attending: INTERNAL MEDICINE
Payer: MEDICARE

## 2020-08-25 ENCOUNTER — PATIENT OUTREACH (OUTPATIENT)
Dept: OTHER | Facility: MEDICAL CENTER | Age: 65
End: 2020-08-25

## 2020-08-25 VITALS
WEIGHT: 150.35 LBS | BODY MASS INDEX: 21.05 KG/M2 | OXYGEN SATURATION: 96 % | DIASTOLIC BLOOD PRESSURE: 82 MMHG | RESPIRATION RATE: 18 BRPM | SYSTOLIC BLOOD PRESSURE: 139 MMHG | TEMPERATURE: 97.3 F | HEIGHT: 71 IN | HEART RATE: 72 BPM

## 2020-08-25 DIAGNOSIS — C25.0 MALIGNANT NEOPLASM OF HEAD OF PANCREAS (HCC): ICD-10-CM

## 2020-08-25 LAB
ALBUMIN SERPL BCP-MCNC: 3.9 G/DL (ref 3.2–4.9)
ALBUMIN/GLOB SERPL: 1.6 G/DL
ALP SERPL-CCNC: 150 U/L (ref 30–99)
ALT SERPL-CCNC: 45 U/L (ref 2–50)
ANION GAP SERPL CALC-SCNC: 11 MMOL/L (ref 7–16)
AST SERPL-CCNC: 29 U/L (ref 12–45)
BASOPHILS # BLD AUTO: 1.7 % (ref 0–1.8)
BASOPHILS # BLD: 0.19 K/UL (ref 0–0.12)
BILIRUB SERPL-MCNC: 1 MG/DL (ref 0.1–1.5)
BUN SERPL-MCNC: 14 MG/DL (ref 8–22)
CALCIUM SERPL-MCNC: 9.2 MG/DL (ref 8.5–10.5)
CANCER AG19-9 SERPL-ACNC: 289 U/ML (ref 0–35)
CHLORIDE SERPL-SCNC: 101 MMOL/L (ref 96–112)
CO2 SERPL-SCNC: 25 MMOL/L (ref 20–33)
CREAT SERPL-MCNC: 0.7 MG/DL (ref 0.5–1.4)
EOSINOPHIL # BLD AUTO: 0.19 K/UL (ref 0–0.51)
EOSINOPHIL NFR BLD: 1.7 % (ref 0–6.9)
ERYTHROCYTE [DISTWIDTH] IN BLOOD BY AUTOMATED COUNT: 45 FL (ref 35.9–50)
GLOBULIN SER CALC-MCNC: 2.4 G/DL (ref 1.9–3.5)
GLUCOSE SERPL-MCNC: 129 MG/DL (ref 65–99)
HCT VFR BLD AUTO: 34.9 % (ref 42–52)
HGB BLD-MCNC: 12.2 G/DL (ref 14–18)
LYMPHOCYTES # BLD AUTO: 1.32 K/UL (ref 1–4.8)
LYMPHOCYTES NFR BLD: 12.1 % (ref 22–41)
MANUAL DIFF BLD: NORMAL
MCH RBC QN AUTO: 33.9 PG (ref 27–33)
MCHC RBC AUTO-ENTMCNC: 35 G/DL (ref 33.7–35.3)
MCV RBC AUTO: 96.9 FL (ref 81.4–97.8)
MONOCYTES # BLD AUTO: 0.37 K/UL (ref 0–0.85)
MONOCYTES NFR BLD AUTO: 3.4 % (ref 0–13.4)
MORPHOLOGY BLD-IMP: NORMAL
NEUTROPHILS # BLD AUTO: 8.83 K/UL (ref 1.82–7.42)
NEUTROPHILS NFR BLD: 81 % (ref 44–72)
NRBC # BLD AUTO: 0 K/UL
NRBC BLD-RTO: 0 /100 WBC
PLATELET # BLD AUTO: 169 K/UL (ref 164–446)
PLATELET BLD QL SMEAR: NORMAL
PMV BLD AUTO: 10.1 FL (ref 9–12.9)
POTASSIUM SERPL-SCNC: 3.8 MMOL/L (ref 3.6–5.5)
PROT SERPL-MCNC: 6.3 G/DL (ref 6–8.2)
RBC # BLD AUTO: 3.6 M/UL (ref 4.7–6.1)
SODIUM SERPL-SCNC: 137 MMOL/L (ref 135–145)
WBC # BLD AUTO: 10.9 K/UL (ref 4.8–10.8)

## 2020-08-25 PROCEDURE — A4212 NON CORING NEEDLE OR STYLET: HCPCS

## 2020-08-25 PROCEDURE — 36591 DRAW BLOOD OFF VENOUS DEVICE: CPT

## 2020-08-25 PROCEDURE — 80053 COMPREHEN METABOLIC PANEL: CPT

## 2020-08-25 PROCEDURE — 86301 IMMUNOASSAY TUMOR CA 19-9: CPT

## 2020-08-25 PROCEDURE — 85007 BL SMEAR W/DIFF WBC COUNT: CPT

## 2020-08-25 PROCEDURE — 700111 HCHG RX REV CODE 636 W/ 250 OVERRIDE (IP): Performed by: INTERNAL MEDICINE

## 2020-08-25 PROCEDURE — 700111 HCHG RX REV CODE 636 W/ 250 OVERRIDE (IP)

## 2020-08-25 PROCEDURE — 85027 COMPLETE CBC AUTOMATED: CPT

## 2020-08-25 RX ORDER — HEPARIN SODIUM (PORCINE) LOCK FLUSH IV SOLN 100 UNIT/ML 100 UNIT/ML
500 SOLUTION INTRAVENOUS PRN
Status: DISCONTINUED | OUTPATIENT
Start: 2020-08-25 | End: 2020-08-25 | Stop reason: HOSPADM

## 2020-08-25 RX ORDER — LIDOCAINE HYDROCHLORIDE 10 MG/ML
INJECTION, SOLUTION EPIDURAL; INFILTRATION; INTRACAUDAL; PERINEURAL
Status: COMPLETED
Start: 2020-08-25 | End: 2020-08-25

## 2020-08-25 RX ADMIN — LIDOCAINE HYDROCHLORIDE 5 ML: 10 INJECTION, SOLUTION EPIDURAL; INFILTRATION; INTRACAUDAL at 08:37

## 2020-08-25 RX ADMIN — HEPARIN 500 UNITS: 100 SYRINGE at 08:44

## 2020-08-25 ASSESSMENT — FIBROSIS 4 INDEX: FIB4 SCORE: 1.26

## 2020-08-25 NOTE — PROGRESS NOTES
"Pharmacy Chemotherapy Calculation    Patient Name: Janes Cody   Dx: Pancreatic Cancer  Protocol: mFOLFIRINOX     *Dosing Reference*  Oxaliplatin 85 mg/m2 IV over 2 hours on Day 1 followed by  Irinotecan 150 mg/m2 IV over 90 minutes on Day 1 given concurrently with    8/12/20 dose reduced to 135 mg/m2 IV per MD due to T.Bili per Dr. Iqbal only for C1  Leucovorin 400 mg/m2 IV over 90 minutes on Day 1 followed by   Fluorouracil 2400 mg/m2 CIVI over 46 hours    14 day cycle for 12 cycles  Ronald NGUYEN et al. N Engl J Med. 35846;364(83):181-84    Allergies:  Patient has no known allergies.     /84   Pulse 71   Temp 36.6 °C (97.8 °F) (Temporal)   Resp 18   Ht 1.805 m (5' 11.06\")   Wt 68 kg (149 lb 14.6 oz)   SpO2 99%   BMI 20.87 kg/m²  Body surface area is 1.85 meters squared.    Labs 8/25/20:  ANC~ 8830 Plt = 169k   Hgb = 12.2     SCr = 0.7 mg/dL CrCl ~ 101 mL/min   AST/ALT/ALK  = 29/45/150 TBili = 1      Drug Order   (Drug name, dose, route, IV Fluid & volume, frequency, number of doses) Cycle 2     Previous treatment: C1 on 8/12/20     Medication = Oxaliplatin (Eloxatin)  Base Dose= 85 mg/m2  Calc Dose: Base Dose x 1.85 m2 = 157.25 mg  Final Dose = 157.25 mg  Route = IV  Fluid & Volume = D5W 250 mL  Admin Duration = Over 2 hours          <10% difference, okay to treat with final dose   Medication = Irinotecan (Camptosar)  Base Dose= 150 mg/m2  Calc Dose: Base Dose x 1.85 m2 = 277.5 mg  Final Dose = 277.6 mg  Route = IV  Fluid & Volume = D5W 500 mL  Admin Duration = Over 90 minutes   Give concurrently with Leucovorin       <10% difference, okay to treat with final dose   Medication = Leucovorin   Base Dose= 400 mg/m2  Calc Dose:Base Dose x 1.85 m2 = 740 mg  Final Dose = 740 mg  Route = IV  Fluid & Volume = D5W 250 mL  Admin Duration = Over 90 minutes   Give concurrently with Irinotecan        <10% difference, okay to treat with final dose   Medication = Fluorouracil   Base Dose= 2400 mg/m2 "   Calc Dose: Base Dose x 1.85 m2 = 4440 mg  Final Dose = 4440 mg  Route = CIVI  Fluid & Volume = 88.8 mL (+ 3 mL of overfill = 91.8 mL)  Conc. = 50 mg/mL  Admin Duration = Over 46 hours @ 1.9 mL/hr   Via CADD pump for home infusion       <10% difference, okay to treat with final dose     By my signature below, I confirm this process was performed independently with the BSA and all final chemotherapy dosing calculations congruent. I have reviewed the above chemotherapy order and that my calculation of the final dose and BSA (when applicable) corroborate those calculations of the  pharmacist. Discrepancies of 10% or greater in the written dose have been addressed and documented within the EPIC Progress notes.    Yael Blanchard, PharmD

## 2020-08-25 NOTE — PROGRESS NOTES
On 8-25-20, Oncology Social Worker, Minna Caceres, contacted pt via phone. NOE Caceres followed up on a phone message left on 8-24-20, that pt had a question and requested return call.   NOE Caceres contacted pt. Pt inquired on pt's brother coming to visit pt, from [place]., while pt is in tx. NOE Caceres encouraged pt to contact [facility] to answer pt's questions about pt's brother staying with him in the room and the protocol for screening. Pt verbalized understanding and would contact [facility] for more information on visitors staying with pt.  NOE Caceres inquired on other needs or questions. Pt denied.   NOE Caceres thanked pt for communicating and his time and encouraged pt to call for questions and/or concerns.

## 2020-08-25 NOTE — PROGRESS NOTES
Pt arrived to IS ambulatory, here for pre-chemo labs. Pt with R chest port, requesting lidocaine to numb site. Port site numbed and accessed in sterile field with low-profile needle. Labs drawn as ordered. Port flushed and heparin instilled. Port left accessed for planned chemo tomorrow. Pt discharged home under self care in no apparent distress.

## 2020-08-26 ENCOUNTER — OUTPATIENT INFUSION SERVICES (OUTPATIENT)
Dept: ONCOLOGY | Facility: MEDICAL CENTER | Age: 65
End: 2020-08-26
Attending: INTERNAL MEDICINE
Payer: MEDICARE

## 2020-08-26 VITALS
DIASTOLIC BLOOD PRESSURE: 84 MMHG | BODY MASS INDEX: 20.99 KG/M2 | WEIGHT: 149.91 LBS | OXYGEN SATURATION: 99 % | HEART RATE: 71 BPM | HEIGHT: 71 IN | TEMPERATURE: 97.8 F | RESPIRATION RATE: 18 BRPM | SYSTOLIC BLOOD PRESSURE: 136 MMHG

## 2020-08-26 DIAGNOSIS — C25.0 MALIGNANT NEOPLASM OF HEAD OF PANCREAS (HCC): ICD-10-CM

## 2020-08-26 PROCEDURE — 700111 HCHG RX REV CODE 636 W/ 250 OVERRIDE (IP): Performed by: INTERNAL MEDICINE

## 2020-08-26 PROCEDURE — A4212 NON CORING NEEDLE OR STYLET: HCPCS

## 2020-08-26 PROCEDURE — 96415 CHEMO IV INFUSION ADDL HR: CPT

## 2020-08-26 PROCEDURE — 96417 CHEMO IV INFUS EACH ADDL SEQ: CPT

## 2020-08-26 PROCEDURE — 96413 CHEMO IV INFUSION 1 HR: CPT

## 2020-08-26 PROCEDURE — 700105 HCHG RX REV CODE 258: Performed by: INTERNAL MEDICINE

## 2020-08-26 PROCEDURE — 96368 THER/DIAG CONCURRENT INF: CPT

## 2020-08-26 PROCEDURE — G0498 CHEMO EXTEND IV INFUS W/PUMP: HCPCS

## 2020-08-26 PROCEDURE — 96375 TX/PRO/DX INJ NEW DRUG ADDON: CPT

## 2020-08-26 RX ORDER — ONDANSETRON 8 MG/1
8 TABLET, ORALLY DISINTEGRATING ORAL PRN
Status: CANCELLED | OUTPATIENT
Start: 2020-08-26

## 2020-08-26 RX ORDER — DIPHENHYDRAMINE HYDROCHLORIDE 50 MG/ML
50 INJECTION INTRAMUSCULAR; INTRAVENOUS PRN
Status: CANCELLED | OUTPATIENT
Start: 2020-08-26

## 2020-08-26 RX ORDER — LOPERAMIDE HYDROCHLORIDE 2 MG/1
2 CAPSULE ORAL
Status: CANCELLED | OUTPATIENT
Start: 2020-08-26

## 2020-08-26 RX ORDER — DEXTROSE MONOHYDRATE 50 MG/ML
INJECTION, SOLUTION INTRAVENOUS CONTINUOUS
Status: CANCELLED | OUTPATIENT
Start: 2020-08-26

## 2020-08-26 RX ORDER — EPINEPHRINE 1 MG/ML(1)
0.5 AMPUL (ML) INJECTION PRN
Status: CANCELLED | OUTPATIENT
Start: 2020-08-26

## 2020-08-26 RX ORDER — HEPARIN SODIUM (PORCINE) LOCK FLUSH IV SOLN 100 UNIT/ML 100 UNIT/ML
500 SOLUTION INTRAVENOUS PRN
Status: CANCELLED | OUTPATIENT
Start: 2020-08-26

## 2020-08-26 RX ORDER — 0.9 % SODIUM CHLORIDE 0.9 %
10 VIAL (ML) INJECTION PRN
Status: CANCELLED | OUTPATIENT
Start: 2020-08-26

## 2020-08-26 RX ORDER — METHYLPREDNISOLONE SODIUM SUCCINATE 125 MG/2ML
125 INJECTION, POWDER, LYOPHILIZED, FOR SOLUTION INTRAMUSCULAR; INTRAVENOUS PRN
Status: CANCELLED | OUTPATIENT
Start: 2020-08-26

## 2020-08-26 RX ORDER — LOPERAMIDE HYDROCHLORIDE 2 MG/1
4 CAPSULE ORAL PRN
Status: CANCELLED | OUTPATIENT
Start: 2020-08-26

## 2020-08-26 RX ORDER — ONDANSETRON 2 MG/ML
4 INJECTION INTRAMUSCULAR; INTRAVENOUS PRN
Status: CANCELLED | OUTPATIENT
Start: 2020-08-26

## 2020-08-26 RX ORDER — 0.9 % SODIUM CHLORIDE 0.9 %
3 VIAL (ML) INJECTION PRN
Status: CANCELLED | OUTPATIENT
Start: 2020-08-26

## 2020-08-26 RX ORDER — PROCHLORPERAZINE MALEATE 10 MG
10 TABLET ORAL EVERY 6 HOURS PRN
Status: CANCELLED | OUTPATIENT
Start: 2020-08-26

## 2020-08-26 RX ORDER — 0.9 % SODIUM CHLORIDE 0.9 %
VIAL (ML) INJECTION PRN
Status: CANCELLED | OUTPATIENT
Start: 2020-08-26

## 2020-08-26 RX ADMIN — DEXAMETHASONE SODIUM PHOSPHATE 12 MG: 4 INJECTION, SOLUTION INTRA-ARTICULAR; INTRALESIONAL; INTRAMUSCULAR; INTRAVENOUS; SOFT TISSUE at 10:07

## 2020-08-26 RX ADMIN — ATROPINE SULFATE 0.5 MG: 1 INJECTION, SOLUTION INTRAMUSCULAR; INTRAVENOUS; SUBCUTANEOUS at 13:07

## 2020-08-26 RX ADMIN — LEUCOVORIN CALCIUM 740 MG: 350 INJECTION, POWDER, LYOPHILIZED, FOR SUSPENSION INTRAMUSCULAR; INTRAVENOUS at 13:11

## 2020-08-26 RX ADMIN — FLUOROURACIL 4440 MG: 50 INJECTION, SOLUTION INTRAVENOUS at 14:55

## 2020-08-26 RX ADMIN — IRINOTECAN HYDROCHLORIDE 277.6 MG: 20 INJECTION, SOLUTION INTRAVENOUS at 13:10

## 2020-08-26 RX ADMIN — ONDANSETRON 16 MG: 2 INJECTION INTRAMUSCULAR; INTRAVENOUS at 10:20

## 2020-08-26 RX ADMIN — OXALIPLATIN 157.25 MG: 100 INJECTION, SOLUTION, CONCENTRATE INTRAVENOUS at 10:40

## 2020-08-26 ASSESSMENT — FIBROSIS 4 INDEX: FIB4 SCORE: 1.66

## 2020-08-26 NOTE — PROGRESS NOTES
Patient arrived ambulatory to [facility] for C2D1 FOLFIRINOX.  He denies any n/v, s/s of infection or fevers.  Port already accessed and labs drawn from yesterday's appointment.  Brisk blood return noted and results within parameters to treat.      Pre medications given and chemo infused per order.  Pt tolerated well.  5FU CADD pump connected and settings verified.  Confirmed next appointment and pt ambulated out of clinic in no apparent distress.

## 2020-08-26 NOTE — PROGRESS NOTES
Chemotherapy Verification - SECONDARY RN   C2 D1      Height = 1.805 m  Weight = 68 kg  BSA = 1.85 m2       Medication: oxaliplatin  Dose: 85 mg/m2  Calculated Dose: 157.25 mg                             (In mg/m2, AUC, mg/kg)     Medication: irinotecan  Dose: 150 mg/m2  Calculated Dose: 277.5 mg                             (In mg/m2, AUC, mg/kg)    Medication: fluorouracil CADD pump  Dose: 2400 mg/m2  Calculated Dose: 4440 mg over 46 hours                             (In mg/m2, AUC, mg/kg)      I confirm that this process was performed independently.

## 2020-08-26 NOTE — PROGRESS NOTES
Chemotherapy Verification - PRIMARY RN      Height = 180.5 cm  Weight = 68 kg  BSA = 1.85 m2       Medication: Oxaliplatin  Dose: 85 mg/m2    Calculated Dose: 157.25 mg       Medication: Irinotecan  Dose: 150 mg/m2  Calculated Dose: 277.5 mg                    Medication: Fluorouracil  Dose: 2400 mg/m2 over 46 hours  Calculated Dose: 4440 mg                                 I confirm this process was performed independently with the BSA and all final chemotherapy dosing calculations congruent.  Any discrepancies of 10% or greater have been addressed with the chemotherapy pharmacist. The resolution of the discrepancy has been documented in the EPIC progress notes.

## 2020-08-26 NOTE — PROGRESS NOTES
"Pharmacy chemotherapy Calculation:     Dx: pancreatic cancer      Cycle 2    Previous treatment = 8/12/20    Protocol: FOLFIRINOX     *Dosing Reference*  oxaliplatin 85mg/m2 IV over 2  Hours on day 1  Irinotecan 180mg/m2 IV over 90 min on day 1   - 8/12/20- dose reduce to 135mg/m2 (25% reduction) for the dose on 8/12/20 only   - 8/26/20 C2 Irino dose 150 mg/m2  Leucovorin 400mg/m2 IV over 90 min concurrent with irinotecan on day 1  -deleted for tolerance  Fluorouracil 1200mg/m2 CIVI over 24 hrs on days 1 and 2(2400mg/m2 IV over 46 hours)  NCCN Guidelines for Pancreatic Adenocarcinoma V.2.2016  q14 day cycle x 4-12 cycles or until DP/UT(metastatic)  Ronald NGUYEN et al - N Engl J Med. 2011 May 12;364(13):1812-53. doi: 10.1056/[id number].    Allergies: No Active Allergies       /84   Pulse 71   Temp 36.6 °C (97.8 °F) (Temporal)   Resp 18   Ht 1.805 m (5' 11.06\")   Wt 68 kg (149 lb 14.6 oz)   SpO2 99%   BMI 20.87 kg/m²     Body surface area is 1.85 meters squared.    Labs from 8/25/20 reviewed - all within treatment parameters.       Oxaliplatin 85mg/m2 x 1.85 m2 = 157.25 mg    <10% difference, ok to treat with final dose = 157.25 mg IV    Irinotecan 150 mg/m2 x 1.85 m2 = 277.5 mg    <10% difference, ok to treat with final dose = 277.6 mg IV    Leucovorin 400mg/m2 x 1.85 m2 = 740 mg    <10% difference, ok to treat with final dose = 740 mg IV    Fluorouracil 2400mg/m2 x 1.85m2 = 4440 mg    <10% difference, ok to treat with final dose = 4440 mg CIVI over 46 hrs       Yomaira Jimenez, PharmD, BCOP    "

## 2020-08-27 RX ORDER — 0.9 % SODIUM CHLORIDE 0.9 %
20 VIAL (ML) INJECTION PRN
Status: CANCELLED | OUTPATIENT
Start: 2020-08-28

## 2020-08-27 RX ORDER — HEPARIN SODIUM (PORCINE) LOCK FLUSH IV SOLN 100 UNIT/ML 100 UNIT/ML
500 SOLUTION INTRAVENOUS PRN
Status: CANCELLED | OUTPATIENT
Start: 2020-08-28

## 2020-08-28 ENCOUNTER — OUTPATIENT INFUSION SERVICES (OUTPATIENT)
Dept: ONCOLOGY | Facility: MEDICAL CENTER | Age: 65
End: 2020-08-28
Attending: INTERNAL MEDICINE
Payer: MEDICARE

## 2020-08-28 ENCOUNTER — PATIENT OUTREACH (OUTPATIENT)
Dept: OTHER | Facility: MEDICAL CENTER | Age: 65
End: 2020-08-28

## 2020-08-28 VITALS
HEART RATE: 65 BPM | DIASTOLIC BLOOD PRESSURE: 78 MMHG | TEMPERATURE: 97.6 F | SYSTOLIC BLOOD PRESSURE: 143 MMHG | OXYGEN SATURATION: 99 % | RESPIRATION RATE: 17 BRPM

## 2020-08-28 DIAGNOSIS — C25.0 MALIGNANT NEOPLASM OF HEAD OF PANCREAS (HCC): ICD-10-CM

## 2020-08-28 PROCEDURE — 96523 IRRIG DRUG DELIVERY DEVICE: CPT

## 2020-08-28 PROCEDURE — 700111 HCHG RX REV CODE 636 W/ 250 OVERRIDE (IP)

## 2020-08-28 RX ORDER — HEPARIN SODIUM (PORCINE) LOCK FLUSH IV SOLN 100 UNIT/ML 100 UNIT/ML
SOLUTION INTRAVENOUS
Status: COMPLETED
Start: 2020-08-28 | End: 2020-08-28

## 2020-08-28 RX ADMIN — HEPARIN 500 UNITS: 100 SYRINGE at 15:34

## 2020-08-28 NOTE — PROGRESS NOTES
Pt arrived to IS, ambulatory, for pump disconnect. Pt voices no complaints. Pump stopped upon arrival, 0 mL left to be delivered. Pump shut off, cassette disposed of and pump returned to pharmacy. Port flushed and heparin locked per policy, port de-accessed. Pt left IS with no s/sx of distress. Follow up appointment confirmed.

## 2020-08-28 NOTE — PROGRESS NOTES
ONN called patient to follow up with him.  Patient reports that he is doing really well with his treatments and that he has 2 more scheduled then he will be having some scans to check the status of his disease then he may be having surgery to resection depending on the results of the scan.  Patient reports that he will be needing some lodging assistance again for his appointments coming up in Septembers.  ONN made reservations at [facility] for 09/08/2020- thru the 13th okayed by [facility].  He reports that he does not get his SS income until the 3rd and he is still trying to find some cheap housing until he his completed with his treatment.

## 2020-08-29 ENCOUNTER — OUTPATIENT INFUSION SERVICES (OUTPATIENT)
Dept: ONCOLOGY | Facility: MEDICAL CENTER | Age: 65
End: 2020-08-29
Attending: INTERNAL MEDICINE
Payer: MEDICARE

## 2020-08-29 VITALS
BODY MASS INDEX: 20.93 KG/M2 | RESPIRATION RATE: 18 BRPM | HEIGHT: 71 IN | DIASTOLIC BLOOD PRESSURE: 76 MMHG | OXYGEN SATURATION: 99 % | TEMPERATURE: 97.5 F | HEART RATE: 60 BPM | WEIGHT: 149.47 LBS | SYSTOLIC BLOOD PRESSURE: 142 MMHG

## 2020-08-29 DIAGNOSIS — C25.0 MALIGNANT NEOPLASM OF HEAD OF PANCREAS (HCC): ICD-10-CM

## 2020-08-29 PROCEDURE — 96372 THER/PROPH/DIAG INJ SC/IM: CPT

## 2020-08-29 PROCEDURE — 700111 HCHG RX REV CODE 636 W/ 250 OVERRIDE (IP): Mod: TB | Performed by: INTERNAL MEDICINE

## 2020-08-29 RX ADMIN — PEGFILGRASTIM-CBQV 6 MG: 6 INJECTION, SOLUTION SUBCUTANEOUS at 16:28

## 2020-08-29 ASSESSMENT — FIBROSIS 4 INDEX: FIB4 SCORE: 1.66

## 2020-08-29 NOTE — PROGRESS NOTES
Pt ambulatory to [facility] for C2 D4 post-chemo Udenyca injection.  Pt w/ no s/s of infection, pt has no complaints at this time.  Udenyca injected into right back arm, band-aid placed.  Pt left on foot in care of self in NAD.  Confirmed pt's next appt.

## 2020-09-04 ENCOUNTER — PATIENT OUTREACH (OUTPATIENT)
Dept: OTHER | Facility: MEDICAL CENTER | Age: 65
End: 2020-09-04

## 2020-09-04 NOTE — PROGRESS NOTES
On 9-4-20, Oncology Social Worker, Minna Caceres, received incoming call from pt. Pt informed OSW Morris, pt was calling to f/u on a reservation for [facility] at [facility], Nurse Navigator, alyx Olson.   OSPAULETTE Caceres f/u with MIKY Green. Reservation made for Sept 8-13, was confirmed.

## 2020-09-04 NOTE — PROGRESS NOTES
On 9-4-20, Oncology Social Worker, Minna Caceres, contacted pt via phone. NOE Caceres informed pt that MIKY Green made reservation from September 8-13th. Pt thanked NOE Caceres and is grateful for MIKY Green and NOE Caceres assistance.

## 2020-09-07 RX ORDER — 0.9 % SODIUM CHLORIDE 0.9 %
10 VIAL (ML) INJECTION PRN
Status: CANCELLED | OUTPATIENT
Start: 2020-09-08

## 2020-09-07 RX ORDER — HEPARIN SODIUM (PORCINE) LOCK FLUSH IV SOLN 100 UNIT/ML 100 UNIT/ML
500 SOLUTION INTRAVENOUS PRN
Status: CANCELLED | OUTPATIENT
Start: 2020-09-08

## 2020-09-07 RX ORDER — 0.9 % SODIUM CHLORIDE 0.9 %
VIAL (ML) INJECTION PRN
Status: CANCELLED | OUTPATIENT
Start: 2020-09-08

## 2020-09-07 RX ORDER — 0.9 % SODIUM CHLORIDE 0.9 %
3 VIAL (ML) INJECTION PRN
Status: CANCELLED | OUTPATIENT
Start: 2020-09-08

## 2020-09-08 ENCOUNTER — OUTPATIENT INFUSION SERVICES (OUTPATIENT)
Dept: ONCOLOGY | Facility: MEDICAL CENTER | Age: 65
End: 2020-09-08
Attending: INTERNAL MEDICINE
Payer: MEDICARE

## 2020-09-08 VITALS
RESPIRATION RATE: 18 BRPM | TEMPERATURE: 97 F | OXYGEN SATURATION: 100 % | DIASTOLIC BLOOD PRESSURE: 73 MMHG | WEIGHT: 149.47 LBS | HEIGHT: 71 IN | HEART RATE: 65 BPM | BODY MASS INDEX: 20.93 KG/M2 | SYSTOLIC BLOOD PRESSURE: 150 MMHG

## 2020-09-08 DIAGNOSIS — C25.0 MALIGNANT NEOPLASM OF HEAD OF PANCREAS (HCC): ICD-10-CM

## 2020-09-08 LAB
ALBUMIN SERPL BCP-MCNC: 4 G/DL (ref 3.2–4.9)
ALBUMIN/GLOB SERPL: 1.7 G/DL
ALP SERPL-CCNC: 195 U/L (ref 30–99)
ALT SERPL-CCNC: 50 U/L (ref 2–50)
ANION GAP SERPL CALC-SCNC: 11 MMOL/L (ref 7–16)
AST SERPL-CCNC: 40 U/L (ref 12–45)
BASOPHILS # BLD AUTO: 0.8 % (ref 0–1.8)
BASOPHILS # BLD: 0.11 K/UL (ref 0–0.12)
BILIRUB SERPL-MCNC: 0.7 MG/DL (ref 0.1–1.5)
BUN SERPL-MCNC: 6 MG/DL (ref 8–22)
CALCIUM SERPL-MCNC: 8.7 MG/DL (ref 8.5–10.5)
CANCER AG19-9 SERPL-ACNC: 302 U/ML (ref 0–35)
CHLORIDE SERPL-SCNC: 102 MMOL/L (ref 96–112)
CO2 SERPL-SCNC: 24 MMOL/L (ref 20–33)
CREAT SERPL-MCNC: 0.69 MG/DL (ref 0.5–1.4)
EOSINOPHIL # BLD AUTO: 0.15 K/UL (ref 0–0.51)
EOSINOPHIL NFR BLD: 1.1 % (ref 0–6.9)
ERYTHROCYTE [DISTWIDTH] IN BLOOD BY AUTOMATED COUNT: 45.6 FL (ref 35.9–50)
GLOBULIN SER CALC-MCNC: 2.4 G/DL (ref 1.9–3.5)
GLUCOSE SERPL-MCNC: 107 MG/DL (ref 65–99)
HCT VFR BLD AUTO: 33.7 % (ref 42–52)
HGB BLD-MCNC: 11.6 G/DL (ref 14–18)
IMM GRANULOCYTES # BLD AUTO: 0.37 K/UL (ref 0–0.11)
IMM GRANULOCYTES NFR BLD AUTO: 2.7 % (ref 0–0.9)
LYMPHOCYTES # BLD AUTO: 1.57 K/UL (ref 1–4.8)
LYMPHOCYTES NFR BLD: 11.6 % (ref 22–41)
MCH RBC QN AUTO: 33.1 PG (ref 27–33)
MCHC RBC AUTO-ENTMCNC: 34.4 G/DL (ref 33.7–35.3)
MCV RBC AUTO: 96.3 FL (ref 81.4–97.8)
MONOCYTES # BLD AUTO: 1 K/UL (ref 0–0.85)
MONOCYTES NFR BLD AUTO: 7.4 % (ref 0–13.4)
NEUTROPHILS # BLD AUTO: 10.31 K/UL (ref 1.82–7.42)
NEUTROPHILS NFR BLD: 76.4 % (ref 44–72)
NRBC # BLD AUTO: 0.02 K/UL
NRBC BLD-RTO: 0.1 /100 WBC
PLATELET # BLD AUTO: 173 K/UL (ref 164–446)
PMV BLD AUTO: 9.9 FL (ref 9–12.9)
POTASSIUM SERPL-SCNC: 3.9 MMOL/L (ref 3.6–5.5)
PROT SERPL-MCNC: 6.4 G/DL (ref 6–8.2)
RBC # BLD AUTO: 3.5 M/UL (ref 4.7–6.1)
SODIUM SERPL-SCNC: 137 MMOL/L (ref 135–145)
WBC # BLD AUTO: 13.5 K/UL (ref 4.8–10.8)

## 2020-09-08 PROCEDURE — 85025 COMPLETE CBC W/AUTO DIFF WBC: CPT

## 2020-09-08 PROCEDURE — 700111 HCHG RX REV CODE 636 W/ 250 OVERRIDE (IP)

## 2020-09-08 PROCEDURE — 36591 DRAW BLOOD OFF VENOUS DEVICE: CPT

## 2020-09-08 PROCEDURE — A4212 NON CORING NEEDLE OR STYLET: HCPCS

## 2020-09-08 PROCEDURE — 80053 COMPREHEN METABOLIC PANEL: CPT

## 2020-09-08 PROCEDURE — 86301 IMMUNOASSAY TUMOR CA 19-9: CPT

## 2020-09-08 RX ORDER — 0.9 % SODIUM CHLORIDE 0.9 %
3 VIAL (ML) INJECTION PRN
Status: CANCELLED | OUTPATIENT
Start: 2020-09-09

## 2020-09-08 RX ORDER — LOPERAMIDE HYDROCHLORIDE 2 MG/1
4 CAPSULE ORAL PRN
Status: CANCELLED | OUTPATIENT
Start: 2020-09-09

## 2020-09-08 RX ORDER — 0.9 % SODIUM CHLORIDE 0.9 %
10 VIAL (ML) INJECTION PRN
Status: CANCELLED | OUTPATIENT
Start: 2020-09-09

## 2020-09-08 RX ORDER — HEPARIN SODIUM (PORCINE) LOCK FLUSH IV SOLN 100 UNIT/ML 100 UNIT/ML
500 SOLUTION INTRAVENOUS PRN
Status: DISCONTINUED | OUTPATIENT
Start: 2020-09-08 | End: 2020-09-08 | Stop reason: HOSPADM

## 2020-09-08 RX ORDER — METHYLPREDNISOLONE SODIUM SUCCINATE 125 MG/2ML
125 INJECTION, POWDER, LYOPHILIZED, FOR SOLUTION INTRAMUSCULAR; INTRAVENOUS PRN
Status: CANCELLED | OUTPATIENT
Start: 2020-09-09

## 2020-09-08 RX ORDER — EPINEPHRINE 1 MG/ML(1)
0.5 AMPUL (ML) INJECTION PRN
Status: CANCELLED | OUTPATIENT
Start: 2020-09-09

## 2020-09-08 RX ORDER — HEPARIN SODIUM (PORCINE) LOCK FLUSH IV SOLN 100 UNIT/ML 100 UNIT/ML
500 SOLUTION INTRAVENOUS PRN
Status: CANCELLED | OUTPATIENT
Start: 2020-09-09

## 2020-09-08 RX ORDER — ONDANSETRON 2 MG/ML
4 INJECTION INTRAMUSCULAR; INTRAVENOUS PRN
Status: CANCELLED | OUTPATIENT
Start: 2020-09-09

## 2020-09-08 RX ORDER — HEPARIN SODIUM (PORCINE) LOCK FLUSH IV SOLN 100 UNIT/ML 100 UNIT/ML
500 SOLUTION INTRAVENOUS PRN
Status: CANCELLED | OUTPATIENT
Start: 2020-09-11

## 2020-09-08 RX ORDER — 0.9 % SODIUM CHLORIDE 0.9 %
VIAL (ML) INJECTION PRN
Status: CANCELLED | OUTPATIENT
Start: 2020-09-09

## 2020-09-08 RX ORDER — ONDANSETRON 8 MG/1
8 TABLET, ORALLY DISINTEGRATING ORAL PRN
Status: CANCELLED | OUTPATIENT
Start: 2020-09-09

## 2020-09-08 RX ORDER — PROCHLORPERAZINE MALEATE 10 MG
10 TABLET ORAL EVERY 6 HOURS PRN
Status: CANCELLED | OUTPATIENT
Start: 2020-09-09

## 2020-09-08 RX ORDER — LOPERAMIDE HYDROCHLORIDE 2 MG/1
2 CAPSULE ORAL
Status: CANCELLED | OUTPATIENT
Start: 2020-09-09

## 2020-09-08 RX ORDER — 0.9 % SODIUM CHLORIDE 0.9 %
20 VIAL (ML) INJECTION PRN
Status: CANCELLED | OUTPATIENT
Start: 2020-09-11

## 2020-09-08 RX ORDER — DIPHENHYDRAMINE HYDROCHLORIDE 50 MG/ML
50 INJECTION INTRAMUSCULAR; INTRAVENOUS PRN
Status: CANCELLED | OUTPATIENT
Start: 2020-09-09

## 2020-09-08 RX ORDER — DEXTROSE MONOHYDRATE 50 MG/ML
INJECTION, SOLUTION INTRAVENOUS CONTINUOUS
Status: CANCELLED | OUTPATIENT
Start: 2020-09-09

## 2020-09-08 RX ORDER — HEPARIN SODIUM (PORCINE) LOCK FLUSH IV SOLN 100 UNIT/ML 100 UNIT/ML
SOLUTION INTRAVENOUS
Status: COMPLETED
Start: 2020-09-08 | End: 2020-09-08

## 2020-09-08 RX ADMIN — HEPARIN SODIUM (PORCINE) LOCK FLUSH IV SOLN 100 UNIT/ML 500 UNITS: 100 SOLUTION at 09:55

## 2020-09-08 RX ADMIN — HEPARIN 500 UNITS: 100 SYRINGE at 09:55

## 2020-09-08 ASSESSMENT — FIBROSIS 4 INDEX: FIB4 SCORE: 1.66

## 2020-09-08 NOTE — PROGRESS NOTES
"Pharmacy Chemotherapy Calculation    Patient Name: Janes Cody   Dx: Pancreatic Cancer  Protocol: mFOLFIRINOX     *Dosing Reference*  Oxaliplatin 85 mg/m2 IV over 2 hours on Day 1 followed by  Irinotecan 150 mg/m2 IV over 90 minutes on Day 1 given concurrently with    8/12/20 dose reduced to 135 mg/m2 IV per MD due to T.Bili per Dr. Iqbal only for C1  Leucovorin 400 mg/m2 IV over 90 minutes on Day 1 followed by   Fluorouracil 2400 mg/m2 CIVI over 46 hours    14 day cycle for 12 cycles  Ronald NGUYEN et al. N Engl J Med. 06743;364(71):1811-39    Allergies:  Patient has no known allergies.     /84   Pulse 70   Temp 36.4 °C (97.6 °F) (Temporal)   Resp 18   Ht 1.8 m (5' 10.87\")   Wt 67.8 kg (149 lb 7.6 oz)   SpO2 100%   BMI 20.93 kg/m²  Body surface area is 1.84 meters squared.    Labs 9/8/20:  ANC~ 59094 Plt = 173k   Hgb = 11.6     SCr = 0.69 mg/dL CrCl ~ 100 mL/min   AST/ALT/ALK= 40/50/195 TBili = 0.7      Drug Order   (Drug name, dose, route, IV Fluid & volume, frequency, number of doses) Cycle 3  Previous treatment: C2 on 8/26/20     Medication = Oxaliplatin (Eloxatin)  Base Dose= 85 mg/m2  Calc Dose: Base Dose x 1.84 m2 = 156.4 mg  Final Dose = 156.4 mg  Route = IV  Fluid & Volume = D5W 250 mL  Admin Duration = Over 2 hours          <10% difference, okay to treat with final dose   Medication = Irinotecan (Camptosar)  Base Dose= 150 mg/m2  Calc Dose: Base Dose x 1.84 m2 = 276 mg  Final Dose = 276 mg  Route = IV  Fluid & Volume = D5W 500 mL  Admin Duration = Over 90 minutes   Run concurrently with Leucovorin       <10% difference, okay to treat with final dose   Medication = Leucovorin   Base Dose= 400 mg/m2  Calc Dose:Base Dose x 1.84 m2 = 736 mg  Final Dose = 736 mg  Route = IV  Fluid & Volume = D5W 250 mL  Admin Duration = Over 90 minutes   Run concurrently with Irinotecan        <10% difference, okay to treat with final dose   Medication = Fluorouracil   Base Dose= 2400 mg/m2   Calc Dose: " Base Dose x 1.84 m2 = 4416 mg  Final Dose = 4415 mg  Route = CIVI  Fluid & Volume = 88.3 mL (+ 3 mL of overfill = 91.3 mL)  Conc. = 50 mg/mL  Admin Duration = Over 46 hours @ 1.9 mL/hr   Via CADD pump for home infusion       <10% difference, okay to treat with final dose     By my signature below, I confirm this process was performed independently with the BSA and all final chemotherapy dosing calculations congruent. I have reviewed the above chemotherapy order and that my calculation of the final dose and BSA (when applicable) corroborate those calculations of the  pharmacist. Discrepancies of 10% or greater in the written dose have been addressed and documented within the EPIC Progress notes.    Yael Blanchard, PharmD

## 2020-09-08 NOTE — PROGRESS NOTES
Patient arrived to [facility] for pre chemo labs.  He denies any s/s of infection or fevers.  Port accessed using sterile technique with brisk blood return noted.  Labs drawn per order.  Port flushed, heparin instilled, and left accessed for chemotherapy tomorrow.  Confirmed appointment time and he ambulated out of clinic in no apparent distress.

## 2020-09-09 ENCOUNTER — DOCUMENTATION (OUTPATIENT)
Dept: NUTRITION | Facility: MEDICAL CENTER | Age: 65
End: 2020-09-09

## 2020-09-09 ENCOUNTER — OUTPATIENT INFUSION SERVICES (OUTPATIENT)
Dept: ONCOLOGY | Facility: MEDICAL CENTER | Age: 65
End: 2020-09-09
Attending: INTERNAL MEDICINE
Payer: MEDICARE

## 2020-09-09 VITALS
TEMPERATURE: 97.6 F | DIASTOLIC BLOOD PRESSURE: 84 MMHG | WEIGHT: 149.47 LBS | HEART RATE: 70 BPM | OXYGEN SATURATION: 100 % | BODY MASS INDEX: 20.93 KG/M2 | HEIGHT: 71 IN | SYSTOLIC BLOOD PRESSURE: 137 MMHG | RESPIRATION RATE: 18 BRPM

## 2020-09-09 DIAGNOSIS — C25.0 MALIGNANT NEOPLASM OF HEAD OF PANCREAS (HCC): ICD-10-CM

## 2020-09-09 PROCEDURE — 96413 CHEMO IV INFUSION 1 HR: CPT

## 2020-09-09 PROCEDURE — 96417 CHEMO IV INFUS EACH ADDL SEQ: CPT

## 2020-09-09 PROCEDURE — 96368 THER/DIAG CONCURRENT INF: CPT

## 2020-09-09 PROCEDURE — 700105 HCHG RX REV CODE 258: Performed by: INTERNAL MEDICINE

## 2020-09-09 PROCEDURE — 700111 HCHG RX REV CODE 636 W/ 250 OVERRIDE (IP): Performed by: INTERNAL MEDICINE

## 2020-09-09 PROCEDURE — G0498 CHEMO EXTEND IV INFUS W/PUMP: HCPCS

## 2020-09-09 PROCEDURE — 96415 CHEMO IV INFUSION ADDL HR: CPT

## 2020-09-09 PROCEDURE — 96411 CHEMO IV PUSH ADDL DRUG: CPT

## 2020-09-09 PROCEDURE — 96375 TX/PRO/DX INJ NEW DRUG ADDON: CPT

## 2020-09-09 RX ORDER — DEXTROSE MONOHYDRATE 50 MG/ML
INJECTION, SOLUTION INTRAVENOUS CONTINUOUS
Status: DISCONTINUED | OUTPATIENT
Start: 2020-09-09 | End: 2020-09-09 | Stop reason: HOSPADM

## 2020-09-09 RX ORDER — AMOXICILLIN 500 MG/1
500 CAPSULE ORAL 3 TIMES DAILY
COMMUNITY
End: 2020-09-22

## 2020-09-09 RX ORDER — PROCHLORPERAZINE MALEATE 10 MG
TABLET ORAL
COMMUNITY
Start: 2020-09-03

## 2020-09-09 RX ORDER — ONDANSETRON HYDROCHLORIDE 8 MG/1
TABLET, FILM COATED ORAL
COMMUNITY
Start: 2020-09-03

## 2020-09-09 RX ADMIN — ATROPINE SULFATE 0.5 MG: 1 INJECTION, SOLUTION INTRAMUSCULAR; INTRAVENOUS; SUBCUTANEOUS at 13:26

## 2020-09-09 RX ADMIN — LEUCOVORIN CALCIUM 736 MG: 350 INJECTION, POWDER, LYOPHILIZED, FOR SOLUTION INTRAMUSCULAR; INTRAVENOUS at 13:30

## 2020-09-09 RX ADMIN — DEXTROSE MONOHYDRATE: 50 INJECTION, SOLUTION INTRAVENOUS at 10:05

## 2020-09-09 RX ADMIN — FLUOROURACIL 4415 MG: 50 INJECTION, SOLUTION INTRAVENOUS at 15:30

## 2020-09-09 RX ADMIN — DEXAMETHASONE SODIUM PHOSPHATE 12 MG: 4 INJECTION, SOLUTION INTRA-ARTICULAR; INTRALESIONAL; INTRAMUSCULAR; INTRAVENOUS; SOFT TISSUE at 10:06

## 2020-09-09 RX ADMIN — OXALIPLATIN 156.4 MG: 5 INJECTION, SOLUTION, CONCENTRATE INTRAVENOUS at 11:13

## 2020-09-09 RX ADMIN — ONDANSETRON 16 MG: 2 INJECTION INTRAMUSCULAR; INTRAVENOUS at 10:22

## 2020-09-09 RX ADMIN — IRINOTECAN HYDROCHLORIDE 276 MG: 20 INJECTION, SOLUTION INTRAVENOUS at 13:30

## 2020-09-09 ASSESSMENT — FIBROSIS 4 INDEX: FIB4 SCORE: 2.13

## 2020-09-09 ASSESSMENT — PAIN SCALES - GENERAL: PAINLEVEL: NO PAIN

## 2020-09-09 NOTE — PROGRESS NOTES
Patient here for Day 1, Cycle 3 FOLFIRINOX. Port previously accessed; brisk blood return noted. Labs results previously drawn and reviewed today. Patient report taking PO amoxicillin for a sore throat. Patient states symptoms are improving. Denies any fever or chills. Symptoms reported to Dr. Iqbal. Orders received to proceed with treatment. Pre-medications given per MAR. Oxaliplatin given per MAR. Reinforced education on cold sensitivity with receiving Oxaliplatin. Atropine given per MAR. Prior to Irinotecan. Irinotecan and leucovrin given concurrently per MAR. Patient then connected to 5FU CADD pump. Next appointment scheduled. Discharged to self care; no apparent distress noted.

## 2020-09-09 NOTE — PROGRESS NOTES
Nutrition Services: Brief Update  Weight: 67.8 kg/ 149 lbs, weighed today   Weight History:  68 kg/ 149 lbs on 8/26/20  68.5 kg/ 151 lbs on 8/12/20    Weight Change: wt stable at this time    RD able to visit pt during [facility] appointment. Pt states is doing well, mentions continuing muscle milk, though plans to add ice cream. Mentions abdominal pain is gone, states had an episode of nausea last week, though this has improved. Mentions was constipated one day and experienced diarrhea another day, though these issues resolved on their own. Pt states does lose interest in foods at times due to lack of taste, though continues to eat consistently.      Plan/Recommend:  • Supported use of ice cream in Muscle Milk shakes  • Continuing with good intake, encouraged to reach out to RD if needed.     Pt doing well in regards to nutrition, RD available PRN.   Please contact [phone]'

## 2020-09-09 NOTE — PROGRESS NOTES
Chemotherapy Verification - SECONDARY RN       Height = 70.87 inchs  Weight = 67.8 kg  BSA = 1.84 m2       Medication: Oxaliplatin (Eloxatin)  Dose: 85 mg/m2  Calculated Dose: 156.4 mg                             (In mg/m2, AUC, mg/kg)     Medication: Irinotecan (Camptosar)  Dose: 150 mg/m2  Calculated Dose: 276 mg                             (In mg/m2, AUC, mg/kg)    Medication: Leucovorin  Dose: 400 mg/m2  Calculated Dose: 736 mg                             (In mg/m2, AUC, mg/kg)    Medication: Fluorouracil (Adrucil)  Dose: 2400 mg/m2  Calculated Dose: 4416 mg                             (In mg/m2, AUC, mg/kg)      I confirm that this process was performed independently.

## 2020-09-09 NOTE — PROGRESS NOTES
"Pharmacy chemotherapy Calculation:     Dx: pancreatic cancer      Cycle 3    Previous treatment = 8/26/20    Protocol: FOLFIRINOX       oxaliplatin 85mg/m2 IV over 2  Hours on day 1  Irinotecan 180mg/m2 IV over 90 min on day 1   - 8/12/20- dose reduce to 135mg/m2 (25% reduction) for the dose on 8/12/20 only   - 8/26/20 C2 Irino dose 150 mg/m2  Leucovorin 400mg/m2 IV over 90 min concurrent with irinotecan on day 1  -deleted for tolerance  Fluorouracil 1200mg/m2 CIVI over 24 hrs on days 1 and 2(2400mg/m2 IV over 46 hours)   Q14 day cycle x 4-12 cycles or until DP/UT(metastatic)  *Dosing Reference*  NCCN Guidelines for Pancreatic Adenocarcinoma V.2.2016    Ronald T, et al - N Engl J Med. 2011 May 12;364(08):1815-03. doi: 10.1056/[id number].    Allergies: No Active Allergies       /84   Pulse 70   Temp 36.4 °C (97.6 °F) (Temporal)   Resp 18   Ht 1.8 m (5' 10.87\")   Wt 67.8 kg (149 lb 7.6 oz)   SpO2 100%   BMI 20.93 kg/m²     Body surface area is 1.84 meters squared.    Labs: 9/9/20   Within treatment parameters.    Oxaliplatin 85 mg/m2 x 1.84 m2 = 156.4 mg    <10% difference, ok to treat with final dose = 156.4 mg IV    Irinotecan 150 mg/m2 x 1.84 m2 = 276 mg    <10% difference, ok to treat with final dose = 276 mg IV   Final [conc] ~ 0.54 mg/mL    Leucovorin 400 mg/m2 x 1.84 m2 = 736 mg    <10% difference, ok to treat with final dose = 736 mg IV    Fluorouracil 2400 mg/m2 x 1.84 m2 = 4416 mg    <10% difference, ok to treat with final dose = 4415 mg CIVI over 46 hrs               "

## 2020-09-09 NOTE — PROGRESS NOTES
Chemotherapy Verification - PRIMARY RN      Height = 180 cm  Weight = 67.8 kg  BSA = 1.84 m^2       Medication: Oxaliplatin  Dose: 85 mg/m^2  Calculated Dose: 156.4 mg                             (In mg/m2, AUC, mg/kg)     Medication: Irnotecan  Dose: 150 mg/m^2  Calculated Dose: 276 mg                             (In mg/m2, AUC, mg/kg)    Medication: Fluorouracil  Dose: 2400 mg/m^2  Calculated Dose: 4416 mg over 46 hours                            (In mg/m2, AUC, mg/kg)    I confirm this process was performed independently with the BSA and all final chemotherapy dosing calculations congruent.  Any discrepancies of 10% or greater have been addressed with the chemotherapy pharmacist. The resolution of the discrepancy has been documented in the EPIC progress notes.

## 2020-09-11 ENCOUNTER — OUTPATIENT INFUSION SERVICES (OUTPATIENT)
Dept: ONCOLOGY | Facility: MEDICAL CENTER | Age: 65
End: 2020-09-11
Attending: INTERNAL MEDICINE
Payer: MEDICARE

## 2020-09-11 VITALS
TEMPERATURE: 97.1 F | RESPIRATION RATE: 18 BRPM | OXYGEN SATURATION: 100 % | DIASTOLIC BLOOD PRESSURE: 49 MMHG | HEART RATE: 68 BPM | SYSTOLIC BLOOD PRESSURE: 90 MMHG

## 2020-09-11 DIAGNOSIS — C25.0 MALIGNANT NEOPLASM OF HEAD OF PANCREAS (HCC): ICD-10-CM

## 2020-09-11 PROCEDURE — 96523 IRRIG DRUG DELIVERY DEVICE: CPT

## 2020-09-11 PROCEDURE — 700111 HCHG RX REV CODE 636 W/ 250 OVERRIDE (IP): Performed by: INTERNAL MEDICINE

## 2020-09-11 RX ORDER — HEPARIN SODIUM (PORCINE) LOCK FLUSH IV SOLN 100 UNIT/ML 100 UNIT/ML
500 SOLUTION INTRAVENOUS PRN
Status: DISCONTINUED | OUTPATIENT
Start: 2020-09-11 | End: 2020-09-11 | Stop reason: HOSPADM

## 2020-09-11 RX ADMIN — HEPARIN 500 UNITS: 100 SYRINGE at 16:03

## 2020-09-11 NOTE — PROGRESS NOTES
Pt arrived ambulatory to IS for pump disconnect. POC discussed and pt verbalized understanding. Pump verified at 0 ml, with 90.3 ml delivered. Port flushed per policy (see MAR) and de-accessed with needle intact. Sterile gauze/paper tape applied to site and pt tolerated well. No further orders. Follow-up care discussed and tomorrow's appointment confirmed for Udenyca; pt verbalized understanding.Pt discharged home to self care in no apparent distress at this time.

## 2020-09-12 ENCOUNTER — OUTPATIENT INFUSION SERVICES (OUTPATIENT)
Dept: ONCOLOGY | Facility: MEDICAL CENTER | Age: 65
End: 2020-09-12
Attending: INTERNAL MEDICINE
Payer: MEDICARE

## 2020-09-12 VITALS
DIASTOLIC BLOOD PRESSURE: 89 MMHG | HEART RATE: 71 BPM | SYSTOLIC BLOOD PRESSURE: 145 MMHG | OXYGEN SATURATION: 100 % | TEMPERATURE: 97.5 F | RESPIRATION RATE: 18 BRPM

## 2020-09-12 DIAGNOSIS — C25.0 MALIGNANT NEOPLASM OF HEAD OF PANCREAS (HCC): ICD-10-CM

## 2020-09-12 PROCEDURE — 700111 HCHG RX REV CODE 636 W/ 250 OVERRIDE (IP): Mod: TB | Performed by: INTERNAL MEDICINE

## 2020-09-12 PROCEDURE — 96372 THER/PROPH/DIAG INJ SC/IM: CPT | Performed by: CLINICAL NURSE SPECIALIST

## 2020-09-12 RX ADMIN — PEGFILGRASTIM-CBQV 6 MG: 6 INJECTION, SOLUTION SUBCUTANEOUS at 15:53

## 2020-09-12 ASSESSMENT — PAIN DESCRIPTION - PAIN TYPE: TYPE: INTRACTABLE PAIN

## 2020-09-12 NOTE — PROGRESS NOTES
Patient to infusion for Udenyca injection.  C/o cramping in calves since yesterday which is now improving. Udenyca injected into right back arm.  Bandaid applied. Patient discharged to home in stable condition.

## 2020-09-15 ENCOUNTER — PATIENT OUTREACH (OUTPATIENT)
Dept: OTHER | Facility: MEDICAL CENTER | Age: 65
End: 2020-09-15

## 2020-09-15 NOTE — PROGRESS NOTES
On 9-15-20, Oncology Social Worker, Minna Caceres, phoned pt. for a return call. Pt did not answer. Pt's phone does not have a way to leave a message and is stating phone is not in service.

## 2020-09-15 NOTE — PROGRESS NOTES
On 9-15-20, Oncology Social Worker, Minna Caceres, phoned pt. to notify on stay at [facility] at [facility]. Pt did not answer. Left message with NOE Caceres direct contact information and a request to return call.

## 2020-09-15 NOTE — PROGRESS NOTES
On 9-15-20, Oncology Social Worker, Minna Caceres, attempted to phone pt twice today. First attempt, pt's phone was not in service. Pt contacted Nurse Navigator, Ela Mg, and left a message for NOE Caceres to contact.   NOE Caceres made second attempt, Pt did not answer. Was able to leave message on second attempt with NOE Caceres direct contact information.

## 2020-09-15 NOTE — PROGRESS NOTES
On 9-15-20, Oncology Social Worker, Minna Caceres, contacted pt via phone. NOE Caceres inquired on pt needs and informed pt of attempts to contact. Pt reports phone is “quirky” and pt was on the phone with social security.  Pt reports coming in for tx on 9-18-20 at 9am and last day of tx is 9-26-20. Pt is requesting F authorize stay while pt is in tx.    Pt also reported to NOE Caceres that pt came to [facility] on 9-5 to 9-7-20, without tx or prior authorization from F. Pt reports his daughter had friends over and pt did not have money for a motel.   NOE Caceres spoke with Director, Alana Bentley, about pt coming in on unauthorized days. Director Bentley requested NOE Caceres notify [facility], that going forward, pt is not allowed to utilize [facility] funds without prior authorization.   Director Bentley informed NOE Caceres that [facility] did authorize pt stay, during tx, at [facility] from 9- through 9- (checking out).    NOE Caceres contacted Parris Schwartz at [facility]. NOE Caceres spoke with Parris about pt stay, without authorization. Parris confirmed and would notify staff member of concern.   NOE Caceres reserved pt stay from 9- to 9- (checking out). Confirmation # [place] [id number].

## 2020-09-15 NOTE — PROGRESS NOTES
On 9-11-20, Oncology Social Worker, Minna Caceres, received incoming call and message from pt. Pt requested a return call to update on pt. status. NOE Caceres was out of the office until 9-15-20.

## 2020-09-18 ENCOUNTER — HOSPITAL ENCOUNTER (OUTPATIENT)
Dept: RADIOLOGY | Facility: MEDICAL CENTER | Age: 65
End: 2020-09-18
Attending: INTERNAL MEDICINE
Payer: MEDICARE

## 2020-09-18 DIAGNOSIS — C25.0 MALIGNANT NEOPLASM OF HEAD OF PANCREAS (HCC): ICD-10-CM

## 2020-09-18 PROCEDURE — 700117 HCHG RX CONTRAST REV CODE 255: Performed by: INTERNAL MEDICINE

## 2020-09-18 PROCEDURE — 74170 CT ABD WO CNTRST FLWD CNTRST: CPT

## 2020-09-18 RX ADMIN — IOHEXOL 100 ML: 350 INJECTION, SOLUTION INTRAVENOUS at 09:55

## 2020-09-22 ENCOUNTER — OUTPATIENT INFUSION SERVICES (OUTPATIENT)
Dept: ONCOLOGY | Facility: MEDICAL CENTER | Age: 65
End: 2020-09-22
Attending: INTERNAL MEDICINE
Payer: MEDICARE

## 2020-09-22 VITALS
HEART RATE: 74 BPM | TEMPERATURE: 98 F | HEIGHT: 71 IN | RESPIRATION RATE: 18 BRPM | OXYGEN SATURATION: 98 % | WEIGHT: 148.37 LBS | DIASTOLIC BLOOD PRESSURE: 61 MMHG | SYSTOLIC BLOOD PRESSURE: 137 MMHG | BODY MASS INDEX: 20.77 KG/M2

## 2020-09-22 DIAGNOSIS — C25.0 MALIGNANT NEOPLASM OF HEAD OF PANCREAS (HCC): ICD-10-CM

## 2020-09-22 LAB
ALBUMIN SERPL BCP-MCNC: 3.5 G/DL (ref 3.2–4.9)
ALBUMIN/GLOB SERPL: 1.4 G/DL
ALP SERPL-CCNC: 185 U/L (ref 30–99)
ALT SERPL-CCNC: 43 U/L (ref 2–50)
ANION GAP SERPL CALC-SCNC: 11 MMOL/L (ref 7–16)
AST SERPL-CCNC: 35 U/L (ref 12–45)
BASOPHILS # BLD AUTO: 0.6 % (ref 0–1.8)
BASOPHILS # BLD: 0.05 K/UL (ref 0–0.12)
BILIRUB SERPL-MCNC: 0.3 MG/DL (ref 0.1–1.5)
BUN SERPL-MCNC: 10 MG/DL (ref 8–22)
CALCIUM SERPL-MCNC: 8.5 MG/DL (ref 8.5–10.5)
CANCER AG19-9 SERPL-ACNC: 147 U/ML (ref 0–35)
CHLORIDE SERPL-SCNC: 103 MMOL/L (ref 96–112)
CO2 SERPL-SCNC: 25 MMOL/L (ref 20–33)
CREAT SERPL-MCNC: 0.67 MG/DL (ref 0.5–1.4)
EOSINOPHIL # BLD AUTO: 0.09 K/UL (ref 0–0.51)
EOSINOPHIL NFR BLD: 1.1 % (ref 0–6.9)
ERYTHROCYTE [DISTWIDTH] IN BLOOD BY AUTOMATED COUNT: 48.8 FL (ref 35.9–50)
GLOBULIN SER CALC-MCNC: 2.5 G/DL (ref 1.9–3.5)
GLUCOSE SERPL-MCNC: 118 MG/DL (ref 65–99)
HCT VFR BLD AUTO: 31.9 % (ref 42–52)
HGB BLD-MCNC: 10.9 G/DL (ref 14–18)
IMM GRANULOCYTES # BLD AUTO: 0.16 K/UL (ref 0–0.11)
IMM GRANULOCYTES NFR BLD AUTO: 2 % (ref 0–0.9)
LYMPHOCYTES # BLD AUTO: 1.62 K/UL (ref 1–4.8)
LYMPHOCYTES NFR BLD: 20.4 % (ref 22–41)
MCH RBC QN AUTO: 33.5 PG (ref 27–33)
MCHC RBC AUTO-ENTMCNC: 34.2 G/DL (ref 33.7–35.3)
MCV RBC AUTO: 98.2 FL (ref 81.4–97.8)
MONOCYTES # BLD AUTO: 0.68 K/UL (ref 0–0.85)
MONOCYTES NFR BLD AUTO: 8.6 % (ref 0–13.4)
NEUTROPHILS # BLD AUTO: 5.34 K/UL (ref 1.82–7.42)
NEUTROPHILS NFR BLD: 67.3 % (ref 44–72)
NRBC # BLD AUTO: 0 K/UL
NRBC BLD-RTO: 0 /100 WBC
PLATELET # BLD AUTO: 133 K/UL (ref 164–446)
PMV BLD AUTO: 10.7 FL (ref 9–12.9)
POTASSIUM SERPL-SCNC: 3.7 MMOL/L (ref 3.6–5.5)
PROT SERPL-MCNC: 6 G/DL (ref 6–8.2)
RBC # BLD AUTO: 3.25 M/UL (ref 4.7–6.1)
SODIUM SERPL-SCNC: 139 MMOL/L (ref 135–145)
WBC # BLD AUTO: 7.9 K/UL (ref 4.8–10.8)

## 2020-09-22 PROCEDURE — 36591 DRAW BLOOD OFF VENOUS DEVICE: CPT

## 2020-09-22 PROCEDURE — 700111 HCHG RX REV CODE 636 W/ 250 OVERRIDE (IP): Performed by: INTERNAL MEDICINE

## 2020-09-22 PROCEDURE — 85025 COMPLETE CBC W/AUTO DIFF WBC: CPT

## 2020-09-22 PROCEDURE — A4212 NON CORING NEEDLE OR STYLET: HCPCS

## 2020-09-22 PROCEDURE — 80053 COMPREHEN METABOLIC PANEL: CPT

## 2020-09-22 PROCEDURE — 86301 IMMUNOASSAY TUMOR CA 19-9: CPT

## 2020-09-22 RX ORDER — EPINEPHRINE 1 MG/ML(1)
0.5 AMPUL (ML) INJECTION PRN
Status: CANCELLED | OUTPATIENT
Start: 2020-09-23

## 2020-09-22 RX ORDER — ONDANSETRON 8 MG/1
8 TABLET, ORALLY DISINTEGRATING ORAL PRN
Status: CANCELLED | OUTPATIENT
Start: 2020-09-23

## 2020-09-22 RX ORDER — 0.9 % SODIUM CHLORIDE 0.9 %
3 VIAL (ML) INJECTION PRN
Status: CANCELLED | OUTPATIENT
Start: 2020-09-22

## 2020-09-22 RX ORDER — HEPARIN SODIUM (PORCINE) LOCK FLUSH IV SOLN 100 UNIT/ML 100 UNIT/ML
500 SOLUTION INTRAVENOUS PRN
Status: DISCONTINUED | OUTPATIENT
Start: 2020-09-22 | End: 2020-09-22 | Stop reason: HOSPADM

## 2020-09-22 RX ORDER — HEPARIN SODIUM (PORCINE) LOCK FLUSH IV SOLN 100 UNIT/ML 100 UNIT/ML
500 SOLUTION INTRAVENOUS PRN
Status: CANCELLED | OUTPATIENT
Start: 2020-09-25

## 2020-09-22 RX ORDER — 0.9 % SODIUM CHLORIDE 0.9 %
10 VIAL (ML) INJECTION PRN
Status: CANCELLED | OUTPATIENT
Start: 2020-09-23

## 2020-09-22 RX ORDER — 0.9 % SODIUM CHLORIDE 0.9 %
VIAL (ML) INJECTION PRN
Status: CANCELLED | OUTPATIENT
Start: 2020-09-22

## 2020-09-22 RX ORDER — 0.9 % SODIUM CHLORIDE 0.9 %
10 VIAL (ML) INJECTION PRN
Status: CANCELLED | OUTPATIENT
Start: 2020-09-22

## 2020-09-22 RX ORDER — 0.9 % SODIUM CHLORIDE 0.9 %
3 VIAL (ML) INJECTION PRN
Status: CANCELLED | OUTPATIENT
Start: 2020-09-23

## 2020-09-22 RX ORDER — LOPERAMIDE HYDROCHLORIDE 2 MG/1
2 CAPSULE ORAL
Status: CANCELLED | OUTPATIENT
Start: 2020-09-23

## 2020-09-22 RX ORDER — METHYLPREDNISOLONE SODIUM SUCCINATE 125 MG/2ML
125 INJECTION, POWDER, LYOPHILIZED, FOR SOLUTION INTRAMUSCULAR; INTRAVENOUS PRN
Status: CANCELLED | OUTPATIENT
Start: 2020-09-23

## 2020-09-22 RX ORDER — HEPARIN SODIUM (PORCINE) LOCK FLUSH IV SOLN 100 UNIT/ML 100 UNIT/ML
500 SOLUTION INTRAVENOUS PRN
Status: CANCELLED | OUTPATIENT
Start: 2020-09-23

## 2020-09-22 RX ORDER — DEXTROSE MONOHYDRATE 50 MG/ML
INJECTION, SOLUTION INTRAVENOUS CONTINUOUS
Status: CANCELLED | OUTPATIENT
Start: 2020-09-23

## 2020-09-22 RX ORDER — HEPARIN SODIUM (PORCINE) LOCK FLUSH IV SOLN 100 UNIT/ML 100 UNIT/ML
500 SOLUTION INTRAVENOUS PRN
Status: CANCELLED | OUTPATIENT
Start: 2020-09-22

## 2020-09-22 RX ORDER — 0.9 % SODIUM CHLORIDE 0.9 %
VIAL (ML) INJECTION PRN
Status: CANCELLED | OUTPATIENT
Start: 2020-09-23

## 2020-09-22 RX ORDER — 0.9 % SODIUM CHLORIDE 0.9 %
20 VIAL (ML) INJECTION PRN
Status: CANCELLED | OUTPATIENT
Start: 2020-09-25

## 2020-09-22 RX ORDER — LOPERAMIDE HYDROCHLORIDE 2 MG/1
4 CAPSULE ORAL PRN
Status: CANCELLED | OUTPATIENT
Start: 2020-09-23

## 2020-09-22 RX ORDER — PROCHLORPERAZINE MALEATE 10 MG
10 TABLET ORAL EVERY 6 HOURS PRN
Status: CANCELLED | OUTPATIENT
Start: 2020-09-23

## 2020-09-22 RX ORDER — ONDANSETRON 2 MG/ML
4 INJECTION INTRAMUSCULAR; INTRAVENOUS PRN
Status: CANCELLED | OUTPATIENT
Start: 2020-09-23

## 2020-09-22 RX ORDER — DIPHENHYDRAMINE HYDROCHLORIDE 50 MG/ML
50 INJECTION INTRAMUSCULAR; INTRAVENOUS PRN
Status: CANCELLED | OUTPATIENT
Start: 2020-09-23

## 2020-09-22 RX ADMIN — HEPARIN 500 UNITS: 100 SYRINGE at 16:50

## 2020-09-22 ASSESSMENT — FIBROSIS 4 INDEX
FIB4 SCORE: 2.16
FIB4 SCORE: 2.16

## 2020-09-22 NOTE — PROGRESS NOTES
Janes into infusion services for lab draw. Port site cleaned and accessed in sterile fashion with low profile needle. Labs drawn. Pt tolerated well. Port flushed per [facility] policy, heparin locked, and left accessed for chemotherapy tomorrow. Next appointment scheduled, patient discharged home to self care.

## 2020-09-22 NOTE — PROGRESS NOTES
"Pharmacy chemotherapy Calculation:     Dx: pancreatic cancer      Cycle 4   Previous treatment = 9/9/20    Protocol: FOLFIRINOX       oxaliplatin 85mg/m2 IV over 2  Hours on day 1  Irinotecan 180mg/m2 IV over 90 min on day 1   - 8/12/20- dose reduce to 135mg/m2 (25% reduction) for the dose on 8/12/20 only   - 8/26/20 C2 Irino dose 150 mg/m2  Leucovorin 400mg/m2 IV over 90 min concurrent with irinotecan on day 1  -deleted for tolerance  Fluorouracil 1200mg/m2 CIVI over 24 hrs on days 1 and 2(2400mg/m2 IV over 46 hours)   Q14 day cycle x 4-12 cycles or until DP/UT(metastatic)  *Dosing Reference*  NCCN Guidelines for Pancreatic Adenocarcinoma V.2.2016    Ronald T, et al - N Engl J Med. 2011 May 12;364(42):1819-60. doi: 10.1056/[id number].    Allergies: No Active Allergies       /88   Pulse 60   Temp 36.4 °C (97.6 °F) (Temporal)   Resp 18   Ht 1.8 m (5' 10.87\")   Wt 67.7 kg (149 lb 4 oz)   SpO2 100%   BMI 20.90 kg/m²     Body surface area is 1.84 meters squared.    Labs 9/22/20:  ANC~ 5340 Plt = 133k   Hgb = 10.9     SCr = 0.67 mg/dL CrCl ~ 104 mL/min   AST/ALT/ALK= 35/43/185 TBili = 0.3     Oxaliplatin 85 mg/m2 x 1.84 m2 = 156.4 mg    <10% difference, ok to treat with final dose = 156.4 mg IV    Irinotecan 150 mg/m2 x 1.84 m2 = 276 mg    <10% difference, ok to treat with final dose = 276 mg IV    Leucovorin 400 mg/m2 x 1.84 m2 = 736 mg    <10% difference, ok to treat with final dose = 736 mg IV    Fluorouracil 2400 mg/m2 x 1.84 m2 = 4416 mg    <10% difference, ok to treat with final dose = 4415 mg CIVI over 46 hrs @ 1.9 mL/hr     Yael Blanchard, PharmD          "

## 2020-09-23 ENCOUNTER — OUTPATIENT INFUSION SERVICES (OUTPATIENT)
Dept: ONCOLOGY | Facility: MEDICAL CENTER | Age: 65
End: 2020-09-23
Attending: INTERNAL MEDICINE
Payer: MEDICARE

## 2020-09-23 VITALS
WEIGHT: 149.25 LBS | RESPIRATION RATE: 18 BRPM | TEMPERATURE: 97.6 F | BODY MASS INDEX: 20.9 KG/M2 | SYSTOLIC BLOOD PRESSURE: 151 MMHG | HEIGHT: 71 IN | OXYGEN SATURATION: 100 % | HEART RATE: 60 BPM | DIASTOLIC BLOOD PRESSURE: 88 MMHG

## 2020-09-23 DIAGNOSIS — C25.0 MALIGNANT NEOPLASM OF HEAD OF PANCREAS (HCC): ICD-10-CM

## 2020-09-23 PROCEDURE — G0498 CHEMO EXTEND IV INFUS W/PUMP: HCPCS

## 2020-09-23 PROCEDURE — 96375 TX/PRO/DX INJ NEW DRUG ADDON: CPT

## 2020-09-23 PROCEDURE — 700105 HCHG RX REV CODE 258: Performed by: INTERNAL MEDICINE

## 2020-09-23 PROCEDURE — 96417 CHEMO IV INFUS EACH ADDL SEQ: CPT

## 2020-09-23 PROCEDURE — 96411 CHEMO IV PUSH ADDL DRUG: CPT

## 2020-09-23 PROCEDURE — 700111 HCHG RX REV CODE 636 W/ 250 OVERRIDE (IP): Performed by: INTERNAL MEDICINE

## 2020-09-23 PROCEDURE — 96413 CHEMO IV INFUSION 1 HR: CPT

## 2020-09-23 PROCEDURE — 96415 CHEMO IV INFUSION ADDL HR: CPT

## 2020-09-23 RX ADMIN — OXALIPLATIN 156.4 MG: 5 INJECTION, SOLUTION, CONCENTRATE INTRAVENOUS at 10:28

## 2020-09-23 RX ADMIN — ONDANSETRON 16 MG: 2 INJECTION INTRAMUSCULAR; INTRAVENOUS at 10:01

## 2020-09-23 RX ADMIN — FLUOROURACIL 4415 MG: 50 INJECTION, SOLUTION INTRAVENOUS at 14:40

## 2020-09-23 RX ADMIN — LEUCOVORIN CALCIUM 736 MG: 350 INJECTION, POWDER, LYOPHILIZED, FOR SOLUTION INTRAMUSCULAR; INTRAVENOUS at 12:46

## 2020-09-23 RX ADMIN — DEXAMETHASONE SODIUM PHOSPHATE 12 MG: 4 INJECTION, SOLUTION INTRA-ARTICULAR; INTRALESIONAL; INTRAMUSCULAR; INTRAVENOUS; SOFT TISSUE at 09:49

## 2020-09-23 RX ADMIN — IRINOTECAN HYDROCHLORIDE 276 MG: 20 INJECTION, SOLUTION INTRAVENOUS at 12:47

## 2020-09-23 RX ADMIN — ATROPINE SULFATE 0.5 MG: 1 INJECTION, SOLUTION INTRAMUSCULAR; INTRAVENOUS; SUBCUTANEOUS at 12:41

## 2020-09-23 ASSESSMENT — FIBROSIS 4 INDEX: FIB4 SCORE: 2.65

## 2020-09-23 NOTE — PROGRESS NOTES
Chemotherapy Verification - PRIMARY RN      Height = 180 cm  Weight = 67.3kg  BSA = 1.83 m2       Medication: Oxaliplatin  Dose: 85mg/m2  Calculated Dose: 155.55 mg                           Medication: Irinotecan  Dose: 150mg/m2  Calculated Dose: 274.5mg       Medication: Leucovorin  Dose: 400mg/m2  Calculated Dose: 732mg                          Medication: 5FU Pump  Dose: 2,400mg/m2  Calculated Dose: 4,392mg                              I confirm this process was performed independently with the BSA and all final chemotherapy dosing calculations congruent.  Any discrepancies of 10% or greater have been addressed with the chemotherapy pharmacist. The resolution of the discrepancy has been documented in the EPIC progress notes.

## 2020-09-23 NOTE — PROGRESS NOTES
"Pharmacy Chemotherapy Calculation    Patient Name: Janes Cody   Dx: Pancreatic Cancer  Protocol: mFOLFIRINOX     *Dosing Reference*  Oxaliplatin 85 mg/m2 IV over 2 hours on Day 1 followed by  Irinotecan 150 mg/m2 IV over 90 minutes on Day 1 given concurrently with    8/12/20 dose reduced to 135 mg/m2 IV per MD due to T.Bili per Dr. Iqbal only for C1  Leucovorin 400 mg/m2 IV over 90 minutes on Day 1 followed by   Fluorouracil 2400 mg/m2 CIVI over 46 hours    14 day cycle for 12 cycles  Ronald NGUYEN et al. N Engl J Med. [id number];364(36):1810-89    Allergies:  Patient has no known allergies.     /88   Pulse 60   Temp 36.4 °C (97.6 °F) (Temporal)   Resp 18   Ht 1.8 m (5' 10.87\")   Wt 67.7 kg (149 lb 4 oz)   SpO2 100%   BMI 20.90 kg/m²  Body surface area is 1.84 meters squared.    Labs 9/22/20  ANC~ 5340 Plt = 133k   Hgb = 10.9     SCr = 0.67 mg/dL CrCl ~ 99.4 mL/min (minimum SCr of 0.7)   LFT's = 35/43/185 TBili = 0.3      Drug Order   (Drug name, dose, route, IV Fluid & volume, frequency, number of doses) Cycle 4  Previous treatment: C3 on 9/9/20     Medication = Oxaliplatin (Eloxatin)  Base Dose= 85 mg/m2  Calc Dose: Base Dose x 1.84 m2 = 156.4 mg  Final Dose = 156.4 mg  Route = IV  Fluid & Volume = D5W 250 mL  Admin Duration = Over 2 hours          <10% difference, okay to treat with final dose   Medication = Irinotecan (Camptosar)  Base Dose= 150 mg/m2  Calc Dose: Base Dose x 1.84 m2 = 276 mg  Final Dose = 276 mg  Route = IV  Fluid & Volume = D5W 500 mL  Admin Duration = Over 90 minutes   Run concurrently with Leucovorin       <10% difference, okay to treat with final dose   Medication = Leucovorin   Base Dose= 400 mg/m2  Calc Dose:Base Dose x 1.84 m2 = 736 mg  Final Dose = 736 mg  Route = IV  Fluid & Volume = D5W 250 mL  Admin Duration = Over 90 minutes   Run concurrently with Irinotecan        <10% difference, okay to treat with final dose   Medication = Fluorouracil   Base Dose= 2400 mg/m2 "   Calc Dose: Base Dose x 1.84 m2 = 4416 mg  Final Dose = 4415 mg  Route = CIVI  Fluid & Volume = 88.3 mL (+ 3 mL of overfill = 91.3 mL)  Conc. = 50 mg/mL  Admin Duration = Over 46 hours @ 1.9 mL/hr   Via CADD pump for home infusion       <10% difference, okay to treat with final dose     By my signature below, I confirm this process was performed independently with the BSA and all final chemotherapy dosing calculations congruent. I have reviewed the above chemotherapy order and that my calculation of the final dose and BSA (when applicable) corroborate those calculations of the  pharmacist. Discrepancies of 10% or greater in the written dose have been addressed and documented within the EPIC Progress notes.    Ezio Berry, PharmD

## 2020-09-23 NOTE — PROGRESS NOTES
Chemotherapy Verification - SECONDARY RN       Height = 70.87 inches  Weight = 67.7 kg  BSA = 1.84 m2       Medication: Oxaliplatin (Eloxatin)  Dose: 85 mg/m2  Calculated Dose: 156.4 mg                             (In mg/m2, AUC, mg/kg)     Medication: Irinotecan (Camptosar)  Dose: 150 mg/m2  Calculated Dose: 276 mg                             (In mg/m2, AUC, mg/kg)    Medication: Leucovorin  Dose: 400 mg/m2  Calculated Dose: 736 mg                             (In mg/m2, AUC, mg/kg)    Medication:  Fluorouracil (Adrucil)  Dose: 2400 mg/m2  Calculated Dose: 4,416 mg via CADD pump for CIVI over 46 hrs                             (In mg/m2, AUC, mg/kg)      I confirm that this process was performed independently.

## 2020-09-23 NOTE — PROGRESS NOTES
Pt ambulatory to [facility]. He is feeling well today, no fevers, no pain. L port already accessed from yesterday labs. Labs are in parameters to tx today. Port flushed and blood return noted. Pre meds infused. Oxaliplatin infused without issues. Atropine given and chemo infused without complications. Port flushed and blood return noted prior to connecting CADD pump. Pump running and clamps are unclamped. Pt left infusion in stable condition.

## 2020-09-25 ENCOUNTER — OUTPATIENT INFUSION SERVICES (OUTPATIENT)
Dept: ONCOLOGY | Facility: MEDICAL CENTER | Age: 65
End: 2020-09-25
Attending: INTERNAL MEDICINE
Payer: MEDICARE

## 2020-09-25 VITALS
OXYGEN SATURATION: 100 % | HEART RATE: 60 BPM | TEMPERATURE: 97.9 F | RESPIRATION RATE: 18 BRPM | SYSTOLIC BLOOD PRESSURE: 160 MMHG | DIASTOLIC BLOOD PRESSURE: 83 MMHG

## 2020-09-25 DIAGNOSIS — C25.0 MALIGNANT NEOPLASM OF HEAD OF PANCREAS (HCC): ICD-10-CM

## 2020-09-25 PROCEDURE — 700111 HCHG RX REV CODE 636 W/ 250 OVERRIDE (IP)

## 2020-09-25 PROCEDURE — 96523 IRRIG DRUG DELIVERY DEVICE: CPT

## 2020-09-25 RX ORDER — HEPARIN SODIUM (PORCINE) LOCK FLUSH IV SOLN 100 UNIT/ML 100 UNIT/ML
SOLUTION INTRAVENOUS
Status: COMPLETED
Start: 2020-09-25 | End: 2020-09-25

## 2020-09-25 RX ADMIN — HEPARIN 500 UNITS: 100 SYRINGE at 14:27

## 2020-09-25 NOTE — PROGRESS NOTES
Pt arrived to [facility], ambulatory, for pump disconnect. Pt voices no complaints. Pump stopped upon arrival, 0 mL left to be delivered. Infusion completed at 1405. Pump shut off, cassette disposed of and pump returned to pharmacy. Port flushed and heparin locked per policy, port de-accessed. Pt left IS with no s/sx of distress. Follow up appointment confirmed.

## 2020-09-26 ENCOUNTER — OUTPATIENT INFUSION SERVICES (OUTPATIENT)
Dept: ONCOLOGY | Facility: MEDICAL CENTER | Age: 65
End: 2020-09-26
Attending: INTERNAL MEDICINE
Payer: MEDICARE

## 2020-09-26 VITALS
RESPIRATION RATE: 18 BRPM | HEART RATE: 97 BPM | OXYGEN SATURATION: 96 % | TEMPERATURE: 97.8 F | SYSTOLIC BLOOD PRESSURE: 144 MMHG | DIASTOLIC BLOOD PRESSURE: 82 MMHG

## 2020-09-26 DIAGNOSIS — C25.0 MALIGNANT NEOPLASM OF HEAD OF PANCREAS (HCC): ICD-10-CM

## 2020-09-26 PROCEDURE — 96372 THER/PROPH/DIAG INJ SC/IM: CPT

## 2020-09-26 PROCEDURE — 700111 HCHG RX REV CODE 636 W/ 250 OVERRIDE (IP): Mod: TB | Performed by: INTERNAL MEDICINE

## 2020-09-26 RX ADMIN — PEGFILGRASTIM-CBQV 6 MG: 6 INJECTION, SOLUTION SUBCUTANEOUS at 14:38

## 2020-09-26 NOTE — PROGRESS NOTES
[facility] for Udenyca injection. No issues, no complaints. Injection given to back of R arm and covered with band aid. Pt aware of next appt and left infusion in stable condition.

## 2020-10-05 ENCOUNTER — HOSPITAL ENCOUNTER (OUTPATIENT)
Dept: RADIOLOGY | Facility: MEDICAL CENTER | Age: 65
End: 2020-10-05
Attending: INTERNAL MEDICINE
Payer: MEDICARE

## 2020-10-05 DIAGNOSIS — C25.0 MALIGNANT NEOPLASM OF HEAD OF PANCREAS (HCC): ICD-10-CM

## 2020-10-05 DIAGNOSIS — K40.90 INGUINAL HERNIA, RIGHT: ICD-10-CM

## 2020-10-05 DIAGNOSIS — N28.89 LEFT KIDNEY MASS: ICD-10-CM

## 2020-10-05 PROCEDURE — 76857 US EXAM PELVIC LIMITED: CPT

## 2020-10-05 PROCEDURE — 76775 US EXAM ABDO BACK WALL LIM: CPT

## 2020-10-06 ENCOUNTER — PATIENT OUTREACH (OUTPATIENT)
Dept: OTHER | Facility: MEDICAL CENTER | Age: 65
End: 2020-10-06

## 2020-10-06 ENCOUNTER — OUTPATIENT INFUSION SERVICES (OUTPATIENT)
Dept: ONCOLOGY | Facility: MEDICAL CENTER | Age: 65
End: 2020-10-06
Attending: INTERNAL MEDICINE
Payer: MEDICARE

## 2020-10-06 VITALS
SYSTOLIC BLOOD PRESSURE: 131 MMHG | OXYGEN SATURATION: 95 % | RESPIRATION RATE: 18 BRPM | HEART RATE: 62 BPM | BODY MASS INDEX: 20.46 KG/M2 | HEIGHT: 71 IN | WEIGHT: 146.16 LBS | DIASTOLIC BLOOD PRESSURE: 68 MMHG | TEMPERATURE: 97.2 F

## 2020-10-06 DIAGNOSIS — C25.0 MALIGNANT NEOPLASM OF HEAD OF PANCREAS (HCC): ICD-10-CM

## 2020-10-06 LAB
ALBUMIN SERPL BCP-MCNC: 3.1 G/DL (ref 3.2–4.9)
ALBUMIN/GLOB SERPL: 1.2 G/DL
ALP SERPL-CCNC: 212 U/L (ref 30–99)
ALT SERPL-CCNC: 36 U/L (ref 2–50)
ANION GAP SERPL CALC-SCNC: 10 MMOL/L (ref 7–16)
AST SERPL-CCNC: 36 U/L (ref 12–45)
BASOPHILS # BLD AUTO: 0.6 % (ref 0–1.8)
BASOPHILS # BLD: 0.07 K/UL (ref 0–0.12)
BILIRUB SERPL-MCNC: 0.3 MG/DL (ref 0.1–1.5)
BUN SERPL-MCNC: 11 MG/DL (ref 8–22)
CALCIUM SERPL-MCNC: 8.4 MG/DL (ref 8.5–10.5)
CANCER AG19-9 SERPL-ACNC: 153 U/ML (ref 0–35)
CHLORIDE SERPL-SCNC: 104 MMOL/L (ref 96–112)
CO2 SERPL-SCNC: 23 MMOL/L (ref 20–33)
CREAT SERPL-MCNC: 0.55 MG/DL (ref 0.5–1.4)
EOSINOPHIL # BLD AUTO: 0.11 K/UL (ref 0–0.51)
EOSINOPHIL NFR BLD: 1 % (ref 0–6.9)
ERYTHROCYTE [DISTWIDTH] IN BLOOD BY AUTOMATED COUNT: 54 FL (ref 35.9–50)
GLOBULIN SER CALC-MCNC: 2.6 G/DL (ref 1.9–3.5)
GLUCOSE SERPL-MCNC: 114 MG/DL (ref 65–99)
HCT VFR BLD AUTO: 30 % (ref 42–52)
HGB BLD-MCNC: 10.1 G/DL (ref 14–18)
IMM GRANULOCYTES # BLD AUTO: 0.33 K/UL (ref 0–0.11)
IMM GRANULOCYTES NFR BLD AUTO: 3 % (ref 0–0.9)
LYMPHOCYTES # BLD AUTO: 1.55 K/UL (ref 1–4.8)
LYMPHOCYTES NFR BLD: 13.9 % (ref 22–41)
MCH RBC QN AUTO: 33 PG (ref 27–33)
MCHC RBC AUTO-ENTMCNC: 33.7 G/DL (ref 33.7–35.3)
MCV RBC AUTO: 98 FL (ref 81.4–97.8)
MONOCYTES # BLD AUTO: 1 K/UL (ref 0–0.85)
MONOCYTES NFR BLD AUTO: 9 % (ref 0–13.4)
NEUTROPHILS # BLD AUTO: 8.07 K/UL (ref 1.82–7.42)
NEUTROPHILS NFR BLD: 72.5 % (ref 44–72)
NRBC # BLD AUTO: 0.02 K/UL
NRBC BLD-RTO: 0.2 /100 WBC
PLATELET # BLD AUTO: 155 K/UL (ref 164–446)
PMV BLD AUTO: 10.7 FL (ref 9–12.9)
POTASSIUM SERPL-SCNC: 3.6 MMOL/L (ref 3.6–5.5)
PROT SERPL-MCNC: 5.7 G/DL (ref 6–8.2)
RBC # BLD AUTO: 3.06 M/UL (ref 4.7–6.1)
SODIUM SERPL-SCNC: 137 MMOL/L (ref 135–145)
WBC # BLD AUTO: 11.1 K/UL (ref 4.8–10.8)

## 2020-10-06 PROCEDURE — 85025 COMPLETE CBC W/AUTO DIFF WBC: CPT

## 2020-10-06 PROCEDURE — 80053 COMPREHEN METABOLIC PANEL: CPT

## 2020-10-06 PROCEDURE — A4212 NON CORING NEEDLE OR STYLET: HCPCS

## 2020-10-06 PROCEDURE — 700111 HCHG RX REV CODE 636 W/ 250 OVERRIDE (IP)

## 2020-10-06 PROCEDURE — 86301 IMMUNOASSAY TUMOR CA 19-9: CPT

## 2020-10-06 PROCEDURE — 36591 DRAW BLOOD OFF VENOUS DEVICE: CPT

## 2020-10-06 RX ORDER — HEPARIN SODIUM (PORCINE) LOCK FLUSH IV SOLN 100 UNIT/ML 100 UNIT/ML
SOLUTION INTRAVENOUS
Status: COMPLETED
Start: 2020-10-06 | End: 2020-10-06

## 2020-10-06 RX ORDER — 0.9 % SODIUM CHLORIDE 0.9 %
VIAL (ML) INJECTION PRN
Status: CANCELLED | OUTPATIENT
Start: 2020-10-06

## 2020-10-06 RX ORDER — HEPARIN SODIUM (PORCINE) LOCK FLUSH IV SOLN 100 UNIT/ML 100 UNIT/ML
500 SOLUTION INTRAVENOUS PRN
Status: CANCELLED | OUTPATIENT
Start: 2020-10-06

## 2020-10-06 RX ORDER — 0.9 % SODIUM CHLORIDE 0.9 %
10 VIAL (ML) INJECTION PRN
Status: CANCELLED | OUTPATIENT
Start: 2020-10-06

## 2020-10-06 RX ORDER — LIDOCAINE HYDROCHLORIDE 10 MG/ML
INJECTION, SOLUTION EPIDURAL; INFILTRATION; INTRACAUDAL; PERINEURAL
Status: COMPLETED
Start: 2020-10-06 | End: 2020-10-06

## 2020-10-06 RX ORDER — 0.9 % SODIUM CHLORIDE 0.9 %
3 VIAL (ML) INJECTION PRN
Status: CANCELLED | OUTPATIENT
Start: 2020-10-06

## 2020-10-06 RX ADMIN — LIDOCAINE HYDROCHLORIDE 5 ML: 10 INJECTION, SOLUTION EPIDURAL; INFILTRATION; INTRACAUDAL; PERINEURAL at 15:15

## 2020-10-06 RX ADMIN — HEPARIN 5 ML: 100 SYRINGE at 15:20

## 2020-10-06 ASSESSMENT — FIBROSIS 4 INDEX: FIB4 SCORE: 2.65

## 2020-10-06 NOTE — PROGRESS NOTES
On 10-6-20,Pt returned call to [facility], and left a message stating he had a miss call and [facility]. Pt requested return call.

## 2020-10-06 NOTE — PROGRESS NOTES
On 10-6-20, Oncology Social Worker, Minna Caceres, met with Director, Alana Bentley, with the [facility] and inquired on pt's request for lodging at the [facility] the [facility]. Director Mc went over pt's application and informed NOE Caceres that the funding to continue ongoing lodging for pt has ended.     NOE Caceres met with Nurse Navigator, Stacey Green, and discussed outcome for lodging through [facility] and options for pt (Application for Cancer Support Community, Mens Shelter, and staying with pt's daughter). MIKY Green informed NOE Caceres that pt has 12 cycles of tx and is coming in for cycle 5. Pt will need to utilize other resources for ongoing support.    NOE Caceres phoned pt to f/u on return call and address pt's request for lodging at the [facility]. Pt did not answer. Left message with NOE Caceres direct contact information and requested pt call back to provide resources for other options for lodging.    NOE Caceres contacted Betsy with [facility] to update on pt's funding through [facility] is no longer available and if pt reserves a room it would be paid by pt. Betsy informed NOE Caceres that she would send out a mass email to staff, due to prior incident on Labor Day weekend and pt staying without prior authorization. NOE Caceres thanked Betsy for her time and efforts.

## 2020-10-06 NOTE — PROGRESS NOTES
"Oncology Social Worker, Minna Caceres, contacted pt via phone. NOE Caceres reported to pt of message received. NOE Caceres inquired if pt received NOE Caceres message. Pt reports he did not check his phone.  Pt inquired on lodging assistance through [facility]. NOE Caceres updated pt on Director Mc's decision to decline authorization due to amount of funding and pt needs. NOE Caceres offered to provide other resources for lodging options, Cancer Support Community, and the shelter. Pt declined resources and reports having money saved.  Pt informed NOE Caceres that he will have one more scan and then decide whether or not to transfer his care to a local provider. Pt stated, \"This drive is killing me anyways and I may be better off with local treatment.\"   NOE Caceres encouraged pt to contact if willing to accept resources. Pt verbalized understanding and agreement.  NOE Caceres thanked pt for his time. Pt reports being grateful for help and assistance.  "

## 2020-10-06 NOTE — PROGRESS NOTES
Patient presents for blood draw prior to chemotherapy. Reviewed appointments with patient, he verbalizes understanding. Blood drawn from port per patient's request. Gauze and paper tape to site. Patient returns Thursday and released in no acute distress.

## 2020-10-06 NOTE — PROGRESS NOTES
Late Entry:    On 10/6/20, Oncology Social Worker, Minna Caceres, received incoming phone message from pt. that was left on 10-5-20 at 3:38pm. Pt reported coming into town and inquired on [facility] providing lodging through [facility]. Pt requested return call. Did not leave contact information.

## 2020-10-07 RX ORDER — METHYLPREDNISOLONE SODIUM SUCCINATE 125 MG/2ML
125 INJECTION, POWDER, LYOPHILIZED, FOR SOLUTION INTRAMUSCULAR; INTRAVENOUS PRN
Status: CANCELLED | OUTPATIENT
Start: 2020-10-08

## 2020-10-07 RX ORDER — ONDANSETRON 8 MG/1
8 TABLET, ORALLY DISINTEGRATING ORAL PRN
Status: CANCELLED | OUTPATIENT
Start: 2020-10-08

## 2020-10-07 RX ORDER — LOPERAMIDE HYDROCHLORIDE 2 MG/1
2 CAPSULE ORAL
Status: CANCELLED | OUTPATIENT
Start: 2020-10-08

## 2020-10-07 RX ORDER — EPINEPHRINE 1 MG/ML(1)
0.5 AMPUL (ML) INJECTION PRN
Status: CANCELLED | OUTPATIENT
Start: 2020-10-08

## 2020-10-07 RX ORDER — ONDANSETRON 2 MG/ML
4 INJECTION INTRAMUSCULAR; INTRAVENOUS PRN
Status: CANCELLED | OUTPATIENT
Start: 2020-10-08

## 2020-10-07 RX ORDER — 0.9 % SODIUM CHLORIDE 0.9 %
VIAL (ML) INJECTION PRN
Status: CANCELLED | OUTPATIENT
Start: 2020-10-08

## 2020-10-07 RX ORDER — HEPARIN SODIUM (PORCINE) LOCK FLUSH IV SOLN 100 UNIT/ML 100 UNIT/ML
500 SOLUTION INTRAVENOUS PRN
Status: CANCELLED | OUTPATIENT
Start: 2020-10-08

## 2020-10-07 RX ORDER — PROCHLORPERAZINE MALEATE 10 MG
10 TABLET ORAL EVERY 6 HOURS PRN
Status: CANCELLED | OUTPATIENT
Start: 2020-10-08

## 2020-10-07 RX ORDER — 0.9 % SODIUM CHLORIDE 0.9 %
3 VIAL (ML) INJECTION PRN
Status: CANCELLED | OUTPATIENT
Start: 2020-10-08

## 2020-10-07 RX ORDER — 0.9 % SODIUM CHLORIDE 0.9 %
10 VIAL (ML) INJECTION PRN
Status: CANCELLED | OUTPATIENT
Start: 2020-10-08

## 2020-10-07 RX ORDER — DEXTROSE MONOHYDRATE 50 MG/ML
INJECTION, SOLUTION INTRAVENOUS CONTINUOUS
Status: CANCELLED | OUTPATIENT
Start: 2020-10-08

## 2020-10-07 RX ORDER — LOPERAMIDE HYDROCHLORIDE 2 MG/1
4 CAPSULE ORAL PRN
Status: CANCELLED | OUTPATIENT
Start: 2020-10-08

## 2020-10-07 RX ORDER — DIPHENHYDRAMINE HYDROCHLORIDE 50 MG/ML
50 INJECTION INTRAMUSCULAR; INTRAVENOUS PRN
Status: CANCELLED | OUTPATIENT
Start: 2020-10-08

## 2020-10-08 ENCOUNTER — OUTPATIENT INFUSION SERVICES (OUTPATIENT)
Dept: ONCOLOGY | Facility: MEDICAL CENTER | Age: 65
End: 2020-10-08
Attending: INTERNAL MEDICINE
Payer: MEDICARE

## 2020-10-08 VITALS
TEMPERATURE: 97.1 F | BODY MASS INDEX: 20.4 KG/M2 | OXYGEN SATURATION: 99 % | SYSTOLIC BLOOD PRESSURE: 162 MMHG | HEIGHT: 71 IN | RESPIRATION RATE: 18 BRPM | DIASTOLIC BLOOD PRESSURE: 72 MMHG | WEIGHT: 145.72 LBS | HEART RATE: 60 BPM

## 2020-10-08 DIAGNOSIS — C25.0 MALIGNANT NEOPLASM OF HEAD OF PANCREAS (HCC): ICD-10-CM

## 2020-10-08 PROCEDURE — G0498 CHEMO EXTEND IV INFUS W/PUMP: HCPCS

## 2020-10-08 PROCEDURE — 96415 CHEMO IV INFUSION ADDL HR: CPT

## 2020-10-08 PROCEDURE — 96413 CHEMO IV INFUSION 1 HR: CPT

## 2020-10-08 PROCEDURE — A4212 NON CORING NEEDLE OR STYLET: HCPCS

## 2020-10-08 PROCEDURE — 700111 HCHG RX REV CODE 636 W/ 250 OVERRIDE (IP)

## 2020-10-08 PROCEDURE — 96375 TX/PRO/DX INJ NEW DRUG ADDON: CPT

## 2020-10-08 PROCEDURE — 700105 HCHG RX REV CODE 258: Performed by: INTERNAL MEDICINE

## 2020-10-08 PROCEDURE — 700111 HCHG RX REV CODE 636 W/ 250 OVERRIDE (IP): Performed by: INTERNAL MEDICINE

## 2020-10-08 PROCEDURE — 96417 CHEMO IV INFUS EACH ADDL SEQ: CPT

## 2020-10-08 RX ORDER — HEPARIN SODIUM (PORCINE) LOCK FLUSH IV SOLN 100 UNIT/ML 100 UNIT/ML
500 SOLUTION INTRAVENOUS PRN
Status: CANCELLED | OUTPATIENT
Start: 2020-10-10

## 2020-10-08 RX ORDER — 0.9 % SODIUM CHLORIDE 0.9 %
20 VIAL (ML) INJECTION PRN
Status: CANCELLED | OUTPATIENT
Start: 2020-10-10

## 2020-10-08 RX ORDER — LIDOCAINE HYDROCHLORIDE 10 MG/ML
INJECTION, SOLUTION EPIDURAL; INFILTRATION; INTRACAUDAL; PERINEURAL
Status: COMPLETED
Start: 2020-10-08 | End: 2020-10-08

## 2020-10-08 RX ORDER — DEXTROSE MONOHYDRATE 50 MG/ML
INJECTION, SOLUTION INTRAVENOUS CONTINUOUS
Status: DISCONTINUED | OUTPATIENT
Start: 2020-10-08 | End: 2020-10-08 | Stop reason: HOSPADM

## 2020-10-08 RX ADMIN — DEXTROSE MONOHYDRATE: 50 INJECTION, SOLUTION INTRAVENOUS at 10:35

## 2020-10-08 RX ADMIN — IRINOTECAN HYDROCHLORIDE 273 MG: 20 INJECTION, SOLUTION INTRAVENOUS at 13:36

## 2020-10-08 RX ADMIN — OXALIPLATIN 154.7 MG: 5 INJECTION, SOLUTION, CONCENTRATE INTRAVENOUS at 11:31

## 2020-10-08 RX ADMIN — Medication 0.5 ML: at 10:19

## 2020-10-08 RX ADMIN — DEXAMETHASONE SODIUM PHOSPHATE 12 MG: 4 INJECTION, SOLUTION INTRA-ARTICULAR; INTRALESIONAL; INTRAMUSCULAR; INTRAVENOUS; SOFT TISSUE at 10:34

## 2020-10-08 RX ADMIN — ATROPINE SULFATE 0.5 MG: 1 INJECTION, SOLUTION INTRAMUSCULAR; INTRAVENOUS; SUBCUTANEOUS at 13:34

## 2020-10-08 RX ADMIN — ONDANSETRON 16 MG: 2 INJECTION INTRAMUSCULAR; INTRAVENOUS at 10:54

## 2020-10-08 RX ADMIN — LEUCOVORIN CALCIUM 728 MG: 350 INJECTION, POWDER, LYOPHILIZED, FOR SUSPENSION INTRAMUSCULAR; INTRAVENOUS at 13:36

## 2020-10-08 RX ADMIN — FLUOROURACIL 4370 MG: 50 INJECTION, SOLUTION INTRAVENOUS at 15:24

## 2020-10-08 RX ADMIN — LIDOCAINE HYDROCHLORIDE 0.5 ML: 10 INJECTION, SOLUTION EPIDURAL; INFILTRATION; INTRACAUDAL; PERINEURAL at 10:19

## 2020-10-08 ASSESSMENT — FIBROSIS 4 INDEX: FIB4 SCORE: 2.55

## 2020-10-08 NOTE — PROGRESS NOTES
Chemotherapy Verification - SECONDARY RN       Height = 180 cm  Weight = 66.1 kg  BSA = 1.817 m2       Medication: oxaliplatin  Dose: 85 mg/m2  Calculated Dose: 154.5 mg                             (In mg/m2, AUC, mg/kg)     Medication: Irinotecan  Dose: 150 mg/m2  Calculated Dose: 272.55 mg                             (In mg/m2, AUC, mg/kg)    Medication: Home infusion 5FU  Dose: 2400 mg/m2  Calculated Dose: 4360.8 mg                             (In mg/m2, AUC, mg/kg)    I confirm that this process was performed independently.

## 2020-10-08 NOTE — PROGRESS NOTES
"Pharmacy Chemotherapy Calculation    Patient Name: Janes Cody   Dx: Pancreatic Cancer  Protocol: mFOLFIRINOX       Oxaliplatin 85 mg/m2 IV over 2 hours on Day 1 followed by  Irinotecan 150 mg/m2 IV over 90 minutes on Day 1 given concurrently with    C1 only: dose reduced to 135 mg/m2 IV per MD due to T.Bili per Dr. Iqbal  Leucovorin 400 mg/m2 IV over 90 minutes on Day 1 followed by   Fluorouracil 2400 mg/m2 CIVI over 46 hours    14 day cycle for 12 cycles  *Dosing Reference*  Ronald NGUYEN, et al. N Engl J Med. 42267;364(15):181-00    Allergies:  Patient has no known allergies.     BP (!) 162/72   Pulse 60   Temp 36.2 °C (97.1 °F) (Temporal)   Resp 18   Ht 1.8 m (5' 10.87\")   Wt 66.1 kg (145 lb 11.6 oz)   SpO2 99%   BMI 20.40 kg/m²  Body surface area is 1.82 meters squared.    Labs 10/6/20  Meets treatment parameters     Drug Order   (Drug name, dose, route, IV Fluid & volume, frequency, number of doses) Cycle 5  Previous treatment: 9/23/20     Medication = Oxaliplatin (Eloxatin)  Base Dose= 85 mg/m2  Calc Dose: Base Dose x 1.82 m2 = 154.7 mg  Final Dose = 154.7 mg  Route = IV  Fluid & Volume = D5W 250 mL  Admin Duration = Over 2 hours          <10% difference, okay to treat with final dose   Medication = Irinotecan (Camptosar)  Base Dose= 150 mg/m2  Calc Dose: Base Dose x 1.82 m2 = 273 mg  Final Dose = 273 mg  Final [conc] ~ 0.53 mg/mL  Route = IV  Fluid & Volume = D5W 500 mL  Admin Duration = Over 90 minutes   Run concurrently with Leucovorin       <10% difference, okay to treat with final dose   Medication = Leucovorin   Base Dose= 400 mg/m2  Calc Dose:Base Dose x 1.82 m2 = 728 mg  Final Dose = 728 mg  Route = IV  Fluid & Volume = D5W 250 mL  Admin Duration = Over 90 minutes   Run concurrently with Irinotecan        <10% difference, okay to treat with final dose   Medication = Fluorouracil   Base Dose= 2400 mg/m2   Calc Dose: Base Dose x 1.82 m2 = 4368 mg  Final Dose = 4370 mg  Route = " CIVI  Fluid & Volume = 87.4 mL (+ 3 mL of overfill = 90.4 mL)  Conc. = 50 mg/mL  Admin Duration = Over 46 hours @ 1.9 mL/hr   Via CADD pump for home infusion       <10% difference, okay to treat with final dose

## 2020-10-08 NOTE — PROGRESS NOTES
Chemotherapy Verification - PRIMARY RN    C5    Height = 180 cm  Weight = 66.1 kg  BSA = 1.82 m2       Medication: Oxaliplatin  Dose: 85 mg/m2  Calculated Dose: 154.7 mg                               Medication: Irinotecan  Dose: 150 mg/m2  Calculated Dose: 273 mg                                Medication: Fluorouracil (5FU) CADD  Dose: 2400 mg/m2  Calculated Dose: 4368 mg over 46 hours                                 I confirm this process was performed independently with the BSA and all final chemotherapy dosing calculations congruent.  Any discrepancies of 10% or greater have been addressed with the chemotherapy pharmacist. The resolution of the discrepancy has been documented in the EPIC progress notes.

## 2020-10-08 NOTE — PROGRESS NOTES
"Pharmacy Chemotherapy Calculation:     Dx: pancreatic cancer      Cycle 5  Previous treatment = 9/23/20    Protocol: FOLFIRINOX     *Dosing Reference*  Oxaliplatin 85 mg/m2 IV over 2 hrs on Day 1  Irinotecan  IV over 90 min on Day 1    - 8/12/20: dose reduced to  (25% reduction) for the dose on 8/12/20 only   - 8/26/20 C2 Irino dose 150 mg/m2  Leucovorin 400 mg/m2 IV over 90 min concurrent with Irinotecan on Day 1   deleted for tolerance  Fluorouracil 1200 mg/m2 CIVI over 24 hrs on Days 1 and 2 (2400mg/m2 IV over 46 hours)  14-day cycle x 4-12 cycles or until DP/UT (metastatic)  NCCN Guidelines for Pancreatic Adenocarcinoma V.2.2016    Ronald T, et al - N Engl J Med. 2011 May 12;364(96):1815-74. doi: 10.1056/[id number].    Allergies: No Active Allergies  BP (!) 162/72   Pulse 60   Temp 36.2 °C (97.1 °F) (Temporal)   Resp 18   Ht 1.8 m (5' 10.87\")   Wt 66.1 kg (145 lb 11.6 oz)   SpO2 99%   BMI 20.40 kg/m²     Body surface area is 1.82 meters squared.    All labs (10/6/20) within treatment plan parameters.      Oxaliplatin (Eloxatin) 85 mg/m2 x 1.82 m2 = 154.7 mg    <10% difference, ok to treat with final dose = 154.7 mg IV    Irinotecan (Camptosar) 150 mg/m2 x 1.82 m2 = 273 mg    <10% difference, ok to treat with final dose = 273 mg IV    Leucovorin 400 mg/m2 x 1.82 m2 = 728 mg    <10% difference, ok to treat with final dose = 728 mg IV    Fluorouracil (Adrucil) 2400 mg/m2 x 1.82 m2 = 4368 mg    <10% difference, ok to treat with final dose = 4370 mg CIVI over 46 hrs @ 1.9 mL/hr       Angelic García, PharmD  "

## 2020-10-09 NOTE — PROGRESS NOTES
Pt arrived to [facility] ambulatory, here for for C5 FOLFIRINOX for pancreatic cancer. Pt with L chest port, requesting lidocaine to numb site. Port site numbed and accessed in sterile field. Pt had labs done ahead, results reviewed and WNL for chemo to proceed. Port flushed, brisk blood return observed. Premeds given and chemo infused per MAR. Pt shanta all infusions well. Home pump settings verified with two RNs and pt sent home in no apparent distress. Pt knows to return in 2 days for pump removal/port de-access.

## 2020-10-10 ENCOUNTER — OUTPATIENT INFUSION SERVICES (OUTPATIENT)
Dept: ONCOLOGY | Facility: MEDICAL CENTER | Age: 65
End: 2020-10-10
Attending: INTERNAL MEDICINE
Payer: MEDICARE

## 2020-10-10 VITALS
HEART RATE: 71 BPM | WEIGHT: 145.5 LBS | TEMPERATURE: 97.2 F | HEIGHT: 71 IN | DIASTOLIC BLOOD PRESSURE: 91 MMHG | BODY MASS INDEX: 20.37 KG/M2 | SYSTOLIC BLOOD PRESSURE: 150 MMHG | RESPIRATION RATE: 18 BRPM

## 2020-10-10 DIAGNOSIS — C25.0 MALIGNANT NEOPLASM OF HEAD OF PANCREAS (HCC): ICD-10-CM

## 2020-10-10 PROCEDURE — 700111 HCHG RX REV CODE 636 W/ 250 OVERRIDE (IP): Performed by: INTERNAL MEDICINE

## 2020-10-10 PROCEDURE — 96523 IRRIG DRUG DELIVERY DEVICE: CPT

## 2020-10-10 RX ORDER — HEPARIN SODIUM (PORCINE) LOCK FLUSH IV SOLN 100 UNIT/ML 100 UNIT/ML
500 SOLUTION INTRAVENOUS PRN
Status: DISCONTINUED | OUTPATIENT
Start: 2020-10-10 | End: 2020-10-10 | Stop reason: HOSPADM

## 2020-10-10 RX ADMIN — HEPARIN 500 UNITS: 100 SYRINGE at 16:10

## 2020-10-10 ASSESSMENT — FIBROSIS 4 INDEX: FIB4 SCORE: 2.55

## 2020-10-10 NOTE — PROGRESS NOTES
Patient presents for home infusion pump disconnect. Patient in good spirits, voices no new concerns. Pump reads volume 0 and 90.20 given. Pump disconnected and chemotherapy disposed of properly. Port flushed per protocol with good blood return and de-accessed. Sterile gauze to site. Patient scheduled for his next appointment and released in no acute distress.

## 2020-10-11 ENCOUNTER — OUTPATIENT INFUSION SERVICES (OUTPATIENT)
Dept: ONCOLOGY | Facility: MEDICAL CENTER | Age: 65
End: 2020-10-11
Attending: INTERNAL MEDICINE
Payer: MEDICARE

## 2020-10-11 VITALS
DIASTOLIC BLOOD PRESSURE: 64 MMHG | WEIGHT: 143.3 LBS | RESPIRATION RATE: 18 BRPM | HEART RATE: 63 BPM | TEMPERATURE: 97.8 F | OXYGEN SATURATION: 100 % | SYSTOLIC BLOOD PRESSURE: 151 MMHG | HEIGHT: 71 IN | BODY MASS INDEX: 20.06 KG/M2

## 2020-10-11 DIAGNOSIS — C25.0 MALIGNANT NEOPLASM OF HEAD OF PANCREAS (HCC): ICD-10-CM

## 2020-10-11 PROCEDURE — 700111 HCHG RX REV CODE 636 W/ 250 OVERRIDE (IP): Mod: TB | Performed by: INTERNAL MEDICINE

## 2020-10-11 PROCEDURE — 96372 THER/PROPH/DIAG INJ SC/IM: CPT

## 2020-10-11 RX ADMIN — PEGFILGRASTIM-CBQV 6 MG: 6 INJECTION, SOLUTION SUBCUTANEOUS at 17:55

## 2020-10-11 ASSESSMENT — FIBROSIS 4 INDEX: FIB4 SCORE: 2.55

## 2020-10-12 ENCOUNTER — HOSPITAL ENCOUNTER (OUTPATIENT)
Facility: MEDICAL CENTER | Age: 65
End: 2020-10-13
Attending: EMERGENCY MEDICINE | Admitting: INTERNAL MEDICINE
Payer: MEDICARE

## 2020-10-12 DIAGNOSIS — C25.9 MALIGNANT NEOPLASM OF PANCREAS, UNSPECIFIED LOCATION OF MALIGNANCY (HCC): ICD-10-CM

## 2020-10-12 PROBLEM — D72.829 LEUKOCYTOSIS: Status: ACTIVE | Noted: 2020-10-12

## 2020-10-12 PROBLEM — K86.89: Status: ACTIVE | Noted: 2020-10-12

## 2020-10-12 LAB
ALBUMIN SERPL BCP-MCNC: 3.6 G/DL (ref 3.2–4.9)
ALBUMIN/GLOB SERPL: 1.3 G/DL
ALP SERPL-CCNC: 252 U/L (ref 30–99)
ALT SERPL-CCNC: 74 U/L (ref 2–50)
ANION GAP SERPL CALC-SCNC: 11 MMOL/L (ref 7–16)
ANISOCYTOSIS BLD QL SMEAR: ABNORMAL
AST SERPL-CCNC: 73 U/L (ref 12–45)
BASOPHILS # BLD AUTO: 0 % (ref 0–1.8)
BASOPHILS # BLD: 0 K/UL (ref 0–0.12)
BILIRUB SERPL-MCNC: 0.7 MG/DL (ref 0.1–1.5)
BUN SERPL-MCNC: 20 MG/DL (ref 8–22)
CALCIUM SERPL-MCNC: 8.8 MG/DL (ref 8.4–10.2)
CHLORIDE SERPL-SCNC: 103 MMOL/L (ref 96–112)
CO2 SERPL-SCNC: 22 MMOL/L (ref 20–33)
COVID ORDER STATUS COVID19: NORMAL
CREAT SERPL-MCNC: 0.61 MG/DL (ref 0.5–1.4)
EOSINOPHIL # BLD AUTO: 0.61 K/UL (ref 0–0.51)
EOSINOPHIL NFR BLD: 1 % (ref 0–6.9)
ERYTHROCYTE [DISTWIDTH] IN BLOOD BY AUTOMATED COUNT: 54.4 FL (ref 35.9–50)
GLOBULIN SER CALC-MCNC: 2.8 G/DL (ref 1.9–3.5)
GLUCOSE SERPL-MCNC: 96 MG/DL (ref 65–99)
HCT VFR BLD AUTO: 32.4 % (ref 42–52)
HGB BLD-MCNC: 11 G/DL (ref 14–18)
LIPASE SERPL-CCNC: 13 U/L (ref 7–58)
LYMPHOCYTES # BLD AUTO: 0.61 K/UL (ref 1–4.8)
LYMPHOCYTES NFR BLD: 1 % (ref 22–41)
MACROCYTES BLD QL SMEAR: ABNORMAL
MAGNESIUM SERPL-MCNC: 1.8 MG/DL (ref 1.5–2.5)
MANUAL DIFF BLD: NORMAL
MCH RBC QN AUTO: 32.9 PG (ref 27–33)
MCHC RBC AUTO-ENTMCNC: 34 G/DL (ref 33.7–35.3)
MCV RBC AUTO: 97 FL (ref 81.4–97.8)
MONOCYTES # BLD AUTO: 0 K/UL (ref 0–0.85)
MONOCYTES NFR BLD AUTO: 0 % (ref 0–13.4)
NEUTROPHILS # BLD AUTO: 60.17 K/UL (ref 1.82–7.42)
NEUTROPHILS NFR BLD: 96 % (ref 44–72)
NEUTS BAND NFR BLD MANUAL: 2 % (ref 0–10)
NRBC # BLD AUTO: 0 K/UL
NRBC BLD-RTO: 0 /100 WBC
PATH REV: NORMAL
PATH REV: NORMAL
PLATELET # BLD AUTO: 110 K/UL (ref 164–446)
PLATELET BLD QL SMEAR: NORMAL
PMV BLD AUTO: 10.8 FL (ref 9–12.9)
POTASSIUM SERPL-SCNC: 4.1 MMOL/L (ref 3.6–5.5)
PROT SERPL-MCNC: 6.4 G/DL (ref 6–8.2)
RBC # BLD AUTO: 3.34 M/UL (ref 4.7–6.1)
RBC BLD AUTO: PRESENT
SARS-COV-2 RNA RESP QL NAA+PROBE: NOTDETECTED
SODIUM SERPL-SCNC: 136 MMOL/L (ref 135–145)
SPECIMEN SOURCE: NORMAL
WBC # BLD AUTO: 61.4 K/UL (ref 4.8–10.8)

## 2020-10-12 PROCEDURE — C9803 HOPD COVID-19 SPEC COLLECT: HCPCS | Performed by: EMERGENCY MEDICINE

## 2020-10-12 PROCEDURE — 99285 EMERGENCY DEPT VISIT HI MDM: CPT

## 2020-10-12 PROCEDURE — 83735 ASSAY OF MAGNESIUM: CPT

## 2020-10-12 PROCEDURE — 700102 HCHG RX REV CODE 250 W/ 637 OVERRIDE(OP): Performed by: INTERNAL MEDICINE

## 2020-10-12 PROCEDURE — 99220 PR INITIAL OBSERVATION CARE,LEVL III: CPT | Performed by: INTERNAL MEDICINE

## 2020-10-12 PROCEDURE — 700111 HCHG RX REV CODE 636 W/ 250 OVERRIDE (IP): Performed by: INTERNAL MEDICINE

## 2020-10-12 PROCEDURE — 96372 THER/PROPH/DIAG INJ SC/IM: CPT

## 2020-10-12 PROCEDURE — 80053 COMPREHEN METABOLIC PANEL: CPT

## 2020-10-12 PROCEDURE — 85007 BL SMEAR W/DIFF WBC COUNT: CPT

## 2020-10-12 PROCEDURE — 83690 ASSAY OF LIPASE: CPT

## 2020-10-12 PROCEDURE — 85027 COMPLETE CBC AUTOMATED: CPT

## 2020-10-12 PROCEDURE — A9270 NON-COVERED ITEM OR SERVICE: HCPCS | Performed by: INTERNAL MEDICINE

## 2020-10-12 PROCEDURE — U0003 INFECTIOUS AGENT DETECTION BY NUCLEIC ACID (DNA OR RNA); SEVERE ACUTE RESPIRATORY SYNDROME CORONAVIRUS 2 (SARS-COV-2) (CORONAVIRUS DISEASE [COVID-19]), AMPLIFIED PROBE TECHNIQUE, MAKING USE OF HIGH THROUGHPUT TECHNOLOGIES AS DESCRIBED BY CMS-2020-01-R: HCPCS

## 2020-10-12 PROCEDURE — G0378 HOSPITAL OBSERVATION PER HR: HCPCS

## 2020-10-12 PROCEDURE — 80500 HCHG CLINICAL PATH CONSULT-LIMITED: CPT

## 2020-10-12 RX ORDER — OXYCODONE HYDROCHLORIDE 5 MG/1
5 TABLET ORAL
Status: DISCONTINUED | OUTPATIENT
Start: 2020-10-12 | End: 2020-10-13 | Stop reason: HOSPADM

## 2020-10-12 RX ORDER — LABETALOL HYDROCHLORIDE 5 MG/ML
10 INJECTION, SOLUTION INTRAVENOUS EVERY 4 HOURS PRN
Status: DISCONTINUED | OUTPATIENT
Start: 2020-10-12 | End: 2020-10-13 | Stop reason: HOSPADM

## 2020-10-12 RX ORDER — TAMSULOSIN HYDROCHLORIDE 0.4 MG/1
0.4 CAPSULE ORAL
Status: DISCONTINUED | OUTPATIENT
Start: 2020-10-12 | End: 2020-10-13 | Stop reason: HOSPADM

## 2020-10-12 RX ORDER — AMOXICILLIN 250 MG
2 CAPSULE ORAL 2 TIMES DAILY
Status: DISCONTINUED | OUTPATIENT
Start: 2020-10-12 | End: 2020-10-13 | Stop reason: HOSPADM

## 2020-10-12 RX ORDER — MORPHINE SULFATE 4 MG/ML
4 INJECTION, SOLUTION INTRAMUSCULAR; INTRAVENOUS
Status: DISCONTINUED | OUTPATIENT
Start: 2020-10-12 | End: 2020-10-13 | Stop reason: HOSPADM

## 2020-10-12 RX ORDER — OMEPRAZOLE 20 MG/1
20 CAPSULE, DELAYED RELEASE ORAL DAILY
Status: DISCONTINUED | OUTPATIENT
Start: 2020-10-12 | End: 2020-10-13 | Stop reason: HOSPADM

## 2020-10-12 RX ORDER — LISINOPRIL 5 MG/1
10 TABLET ORAL DAILY
Status: DISCONTINUED | OUTPATIENT
Start: 2020-10-12 | End: 2020-10-13 | Stop reason: HOSPADM

## 2020-10-12 RX ORDER — ACETAMINOPHEN 325 MG/1
650 TABLET ORAL EVERY 6 HOURS PRN
Status: DISCONTINUED | OUTPATIENT
Start: 2020-10-12 | End: 2020-10-13 | Stop reason: HOSPADM

## 2020-10-12 RX ORDER — PANTOPRAZOLE SODIUM 40 MG/1
40 TABLET, DELAYED RELEASE ORAL DAILY
Status: DISCONTINUED | OUTPATIENT
Start: 2020-10-12 | End: 2020-10-12

## 2020-10-12 RX ORDER — BISACODYL 10 MG
10 SUPPOSITORY, RECTAL RECTAL
Status: DISCONTINUED | OUTPATIENT
Start: 2020-10-12 | End: 2020-10-13 | Stop reason: HOSPADM

## 2020-10-12 RX ORDER — ONDANSETRON 4 MG/1
4 TABLET, ORALLY DISINTEGRATING ORAL EVERY 4 HOURS PRN
Status: DISCONTINUED | OUTPATIENT
Start: 2020-10-12 | End: 2020-10-13 | Stop reason: HOSPADM

## 2020-10-12 RX ORDER — POLYETHYLENE GLYCOL 3350 17 G/17G
1 POWDER, FOR SOLUTION ORAL
Status: DISCONTINUED | OUTPATIENT
Start: 2020-10-12 | End: 2020-10-13 | Stop reason: HOSPADM

## 2020-10-12 RX ORDER — OXYCODONE HYDROCHLORIDE 10 MG/1
10 TABLET ORAL
Status: DISCONTINUED | OUTPATIENT
Start: 2020-10-12 | End: 2020-10-13 | Stop reason: HOSPADM

## 2020-10-12 RX ORDER — ONDANSETRON 2 MG/ML
4 INJECTION INTRAMUSCULAR; INTRAVENOUS EVERY 4 HOURS PRN
Status: DISCONTINUED | OUTPATIENT
Start: 2020-10-12 | End: 2020-10-13 | Stop reason: HOSPADM

## 2020-10-12 RX ORDER — ENALAPRILAT 1.25 MG/ML
1.25 INJECTION INTRAVENOUS EVERY 6 HOURS PRN
Status: DISCONTINUED | OUTPATIENT
Start: 2020-10-12 | End: 2020-10-13 | Stop reason: HOSPADM

## 2020-10-12 RX ADMIN — LISINOPRIL 10 MG: 5 TABLET ORAL at 12:57

## 2020-10-12 RX ADMIN — ENOXAPARIN SODIUM 40 MG: 40 INJECTION SUBCUTANEOUS at 12:57

## 2020-10-12 ASSESSMENT — PATIENT HEALTH QUESTIONNAIRE - PHQ9
1. LITTLE INTEREST OR PLEASURE IN DOING THINGS: NOT AT ALL
SUM OF ALL RESPONSES TO PHQ9 QUESTIONS 1 AND 2: 0
2. FEELING DOWN, DEPRESSED, IRRITABLE, OR HOPELESS: NOT AT ALL

## 2020-10-12 ASSESSMENT — COGNITIVE AND FUNCTIONAL STATUS - GENERAL
SUGGESTED CMS G CODE MODIFIER DAILY ACTIVITY: CH
SUGGESTED CMS G CODE MODIFIER MOBILITY: CH
DAILY ACTIVITIY SCORE: 24
MOBILITY SCORE: 24

## 2020-10-12 ASSESSMENT — LIFESTYLE VARIABLES
CONSUMPTION TOTAL: NEGATIVE
HOW MANY TIMES IN THE PAST YEAR HAVE YOU HAD 5 OR MORE DRINKS IN A DAY: 0
TOTAL SCORE: 0
TOTAL SCORE: 0
ON A TYPICAL DAY WHEN YOU DRINK ALCOHOL HOW MANY DRINKS DO YOU HAVE: 0
AVERAGE NUMBER OF DAYS PER WEEK YOU HAVE A DRINK CONTAINING ALCOHOL: 0
HAVE YOU EVER FELT YOU SHOULD CUT DOWN ON YOUR DRINKING: NO
ALCOHOL_USE: NO
EVER FELT BAD OR GUILTY ABOUT YOUR DRINKING: NO
TOTAL SCORE: 0
HAVE PEOPLE ANNOYED YOU BY CRITICIZING YOUR DRINKING: NO
EVER HAD A DRINK FIRST THING IN THE MORNING TO STEADY YOUR NERVES TO GET RID OF A HANGOVER: NO

## 2020-10-12 ASSESSMENT — COPD QUESTIONNAIRES
DURING THE PAST 4 WEEKS HOW MUCH DID YOU FEEL SHORT OF BREATH: SOME OF THE TIME
COPD SCREENING SCORE: 7
HAVE YOU SMOKED AT LEAST 100 CIGARETTES IN YOUR ENTIRE LIFE: YES
DO YOU EVER COUGH UP ANY MUCUS OR PHLEGM?: YES, A FEW DAYS A WEEK OR MONTH

## 2020-10-12 ASSESSMENT — FIBROSIS 4 INDEX
FIB4 SCORE: 5.09
FIB4 SCORE: 2.55

## 2020-10-12 NOTE — H&P
Hospital Medicine History & Physical Note    Date of Service  10/12/2020    Primary Care Physician  Pcp Pt States None    Consultants  GI    Code Status  Full Code    Chief Complaint  Chief Complaint   Patient presents with   • Sent by MD     possible stent placement       History of Presenting Illness  65 y.o. male with hypertension, who was recently diagnosed with adenocarcinoma of the pancreatic head, and undergoing chemotherapy with last cycle last Thursday to Saturday.  He also received Neupogen injection yesterday.  He has a pancreatic stent in place, with first 1 placed 3 months ago and he reports that he gets that replaced every 3 to 4 weeks.  He was sent to the ED as he is due for a pancreatic stent replacement, and unfortunately it was unable to be scheduled as outpatient due to his insurance (medical).  Otherwise he has no complaints.  He had no fevers, chills, nausea vomiting, chest pain, shortness of breath, or cough.  He had no worsening jaundice.  He is not on any abdominal pain, and has no abdominal distention.  Since his chemotherapy, he noticed loose non-watery diarrhea without any hematochezia or melena.      ED course:  The patient was initially evaluated.  Vital signs were stable.  Initial blood work-up showed WBC count of 61,400 with no bandemia.  Electrolytes and renal function are normal.  AST was 73, ALT of 74, and alkaline phosphatase of 252.  Total bilirubin is normal.  GI (Dr. Marquis) was consulted, who will schedule him for pancreatic stent replacement for tomorrow.  Patient was subsequently admitted to the hospitalist service.      Review of Systems  ROS     Pertinent positives/negatives as mentioned above.     A complete review of systems was personally done by me. All other systems were negative.       Past Medical History   has a past medical history of Cancer (HCC) (2020), Dental disorder, Heart burn, Hypertension, Jaundice, and Seizure disorder (HCC).    Surgical History   has a  past surgical history that includes pr ercp,diagnostic (6/22/2020); pr esophagoscopy,endoscopic ultrasnd (6/22/2020); elbow arthroscopy; cath placement (Left, 7/16/2020); pr ercp,diagnostic (7/24/2020); pr ercp,w/removal stone,tamia/pancr ducts (7/24/2020); and pr ercp duct stent placement (7/24/2020).     Family History  Reviewed and not pertinent.       Social History   reports that he has been smoking cigarettes. He has a 12.50 pack-year smoking history. He has never used smokeless tobacco. He reports current alcohol use. He reports current drug use.    Allergies  No Known Allergies    Medications  Prior to Admission Medications   Prescriptions Last Dose Informant Patient Reported? Taking?   lisinopril (PRINIVIL) 10 MG Tab  Patient Yes No   Sig: Take 10 mg by mouth every day. Indications: High Blood Pressure Disorder   ondansetron (ZOFRAN) 8 MG Tab   Yes No   oxyCODONE HCl 5 MG Tablet Abuse-Deterrent   Yes No   Sig: Take  by mouth as needed.   pantoprazole (PROTONIX) 40 MG Tablet Delayed Response  Patient Yes No   Sig: Take 40 mg by mouth every day.   prochlorperazine (COMPAZINE) 10 MG Tab   Yes No   tamsulosin (FLOMAX) 0.4 MG capsule  Patient Yes No   Sig: Take 0.4 mg by mouth ONE-HALF HOUR AFTER BREAKFAST.      Facility-Administered Medications: None       Physical Exam  Temp:  [36.3 °C (97.3 °F)] 36.3 °C (97.3 °F)  Pulse:  [75] 75  Resp:  [17] 17  BP: (140)/(83) 140/83  SpO2:  [100 %] 100 %    Physical Exam  Vitals signs reviewed.   Constitutional:       General: He is not in acute distress.     Appearance: Normal appearance. He is not toxic-appearing or diaphoretic.   HENT:      Head: Normocephalic and atraumatic.      Right Ear: External ear normal.      Left Ear: External ear normal.      Mouth/Throat:      Mouth: Mucous membranes are moist.      Pharynx: No oropharyngeal exudate.   Eyes:      General: No scleral icterus.     Extraocular Movements: Extraocular movements intact.      Conjunctiva/sclera:  Conjunctivae normal.      Pupils: Pupils are equal, round, and reactive to light.   Neck:      Musculoskeletal: Normal range of motion and neck supple.   Cardiovascular:      Rate and Rhythm: Normal rate and regular rhythm.      Heart sounds: Normal heart sounds. No murmur. No gallop.    Pulmonary:      Effort: Pulmonary effort is normal. No respiratory distress.      Breath sounds: Normal breath sounds. No stridor. No wheezing, rhonchi or rales.   Chest:      Chest wall: No tenderness.   Abdominal:      General: Bowel sounds are normal. There is no distension.      Palpations: Abdomen is soft. There is no mass.      Tenderness: There is no abdominal tenderness. There is no guarding or rebound.   Musculoskeletal: Normal range of motion.         General: No swelling.      Right lower leg: No edema.      Left lower leg: No edema.   Lymphadenopathy:      Cervical: No cervical adenopathy.   Skin:     General: Skin is warm and dry.      Coloration: Skin is not jaundiced.      Findings: No rash.   Neurological:      General: No focal deficit present.      Mental Status: He is alert and oriented to person, place, and time.      Cranial Nerves: No cranial nerve deficit.   Psychiatric:         Mood and Affect: Mood normal.         Behavior: Behavior normal.         Thought Content: Thought content normal.         Judgment: Judgment normal.         Laboratory:  Recent Labs     10/12/20  0934   WBC 61.4*   RBC 3.34*   HEMOGLOBIN 11.0*   HEMATOCRIT 32.4*   MCV 97.0   MCH 32.9   MCHC 34.0   RDW 54.4*   PLATELETCT 110*   MPV 10.8     Recent Labs     10/12/20  0934   SODIUM 136   POTASSIUM 4.1   CHLORIDE 103   CO2 22   GLUCOSE 96   BUN 20   CREATININE 0.61   CALCIUM 8.8     Recent Labs     10/12/20  0934   ALTSGPT 74*   ASTSGOT 73*   ALKPHOSPHAT 252*   TBILIRUBIN 0.7   LIPASE 13   GLUCOSE 96         No results for input(s): NTPROBNP in the last 72 hours.      No results for input(s): TROPONINT in the last 72 hours.    Imaging:  No  orders to display         Assessment/Plan:  I anticipate this patient is appropriate for observation status at this time.    Obstruction of pancreatic duct- (present on admission)  Assessment & Plan  -Due to adenocarcinoma of pleural pancreatic head.  Reportedly, he gets his pancreatic stent replaced every 3 to 4 weeks.  Unfortunately, he has [employer], and they are unable to schedule him for stent replacement as outpatient.  -Dr. Marquis of GI has been consulted, who stated that he will be able to put him on the schedule for tomorrow.  -NPO at midnight tonight.     Leukocytosis- (present on admission)  Assessment & Plan  -This is likely related to neupogen/granix which per the patient he received yesterday following his cycle of chemotherapy.  Currently he does not have any signs of infection.  Monitor closely, and trend WBC count.  Watch for fevers.    Pancreatic cancer (HCC)- (present on admission)  Assessment & Plan  -Had cycle of chemotherapy last week, he received Neupogen yesterday.  -Pancreatic stent replacement planned for tomorrow.  -Outpatient oncology follow-up.    Essential hypertension- (present on admission)  Assessment & Plan  -Resume home lisinopril.  Monitor blood pressure trend closely with as needed IV Vasotec and labetalol for significant hypertension.      DVT prophylaxis: Lovenox SQ

## 2020-10-12 NOTE — ASSESSMENT & PLAN NOTE
-Had cycle of chemotherapy last week, he received Neupogen yesterday.  -Pancreatic stent replacement planned for tomorrow.  -Outpatient oncology follow-up.

## 2020-10-12 NOTE — ASSESSMENT & PLAN NOTE
-This is likely related to neupogen/granix which per the patient he received yesterday following his cycle of chemotherapy.  Currently he does not have any signs of infection.  Monitor closely, and trend WBC count.  Watch for fevers.

## 2020-10-12 NOTE — PROGRESS NOTES
Patient arrived ambulatory to [facility] for Udenyca.  He denies any s/s of infection or fevers.  Udenyca given to left back arm, band aid placed.  Pt tolerated well.  Confirmed next appointment and pt ambulated out of clinic in no apparent distress.

## 2020-10-12 NOTE — ED PROVIDER NOTES
ED Provider Note    CHIEF COMPLAINT  Chief Complaint   Patient presents with   • Sent by MD     possible stent placement       HPI  Janes Cody is a 65 y.o. male who presents at the recommendation of nurse.  He was told that he needed to have his pancreatic stent replaced.  He has had this occur a couple times now.  He denies any abdominal pain, nausea or vomiting.  He did have diarrhea today.  No bloody stool.  He has not had a fever or chills.  He just finished chemotherapy.  He received an injection for blood cell stimulant yesterday week.  Has not had cough, congestion, runny nose, sore throat.    REVIEW OF SYSTEMS  As per HPI, otherwise a 10 point review of systems is negative    PAST MEDICAL HISTORY  Past Medical History:   Diagnosis Date   • Cancer (HCC) 2020    pancreatic   • Dental disorder     full upper and lower   • Heart burn    • Hypertension    • Jaundice    • Seizure disorder (HCC)     had a couple seizures once but not on meds and never dx with anything       SOCIAL HISTORY  Social History     Tobacco Use   • Smoking status: Current Every Day Smoker     Packs/day: 0.25     Years: 50.00     Pack years: 12.50     Types: Cigarettes   • Smokeless tobacco: Never Used   Substance Use Topics   • Alcohol use: Yes     Comment: occassional   • Drug use: Yes     Comment: marijuana       SURGICAL HISTORY  Past Surgical History:   Procedure Laterality Date   • PB ERCP,DIAGNOSTIC  7/24/2020    Procedure: ERCP (ENDOSCOPIC RETROGRADE CHOLANGIOPANCREATOGRAPHY);  Surgeon: Rufino Wagner M.D.;  Location: [facility];  Service: Gastroenterology   • PB ERCP,W/REMOVAL STONE,NEY/PANCR DUCTS  7/24/2020    Procedure: ERCP, WITH CALCULUS REMOVAL;  Surgeon: Rufino Wagner M.D.;  Location: [facility];  Service: Gastroenterology   • IA ERCP STENT PLACEMENT BILIARY/PANCREATIC DUCT  7/24/2020    Procedure: ERCP, WITH TUBE OR STENT INSERTION;  Surgeon: Rufino Wagner M.D.;   "Location: SURGERY [facility];  Service: Gastroenterology   • CATH PLACEMENT Left 7/16/2020    Procedure: INSERTION, CATHETER-PORT A CATH;  Surgeon: Rob Wells M.D.;  Location: SURGERY [facility];  Service: General   • PB ERCP,DIAGNOSTIC  6/22/2020    Procedure: ERCP, DIAGNOSTIC;  Surgeon: Melvin Marquis M.D.;  Location: SURGERY [facility];  Service: Gastroenterology   • PB ESOPHAGOSCOPY,ENDOSCOPIC ULTRASND  6/22/2020    Procedure: ESOPHAGOSCOPY, WITH ENDOSCOPIC US;  Surgeon: Melvin Marquis M.D.;  Location: SURGERY [facility];  Service: Gastroenterology   • ELBOW ARTHROSCOPY         CURRENT MEDICATIONS  Home Medications    **Home medications have not yet been reviewed for this encounter**         ALLERGIES  No Known Allergies    PHYSICAL EXAM  VITAL SIGNS: /83   Pulse 75   Temp 36.3 °C (97.3 °F) (Temporal)   Resp 17   Ht 1.88 m (6' 2\")   Wt 63.8 kg (140 lb 10.5 oz)   SpO2 100%   BMI 18.06 kg/m²    Constitutional: Awake and alert.  Thin male in no distress  HENT:  Atraumatic, Normocephalic.Oropharynx moist mucous membranes  Eyes: Scleral icterus  Neck: Supple  Cardiovascular: Normal heart rate, Normal rhythm.  Symmetric peripheral pulses.   Thorax & Lungs: No respiratory distress, No wheezing, No rales, No rhonchi, No chest tenderness.   Abdomen: Bowel sounds normal, soft, non-distended, nontender, no mass  Extremities: No asymmetric swelling  Neurologic: Grossly normal       Labs:  Results for orders placed or performed during the hospital encounter of 10/12/20   CBC WITH DIFFERENTIAL   Result Value Ref Range    WBC 61.4 (HH) 4.8 - 10.8 K/uL    RBC 3.34 (L) 4.70 - 6.10 M/uL    Hemoglobin 11.0 (L) 14.0 - 18.0 g/dL    Hematocrit 32.4 (L) 42.0 - 52.0 %    MCV 97.0 81.4 - 97.8 fL    MCH 32.9 27.0 - 33.0 pg    MCHC 34.0 33.7 - 35.3 g/dL    RDW 54.4 (H) 35.9 - 50.0 fL    Platelet Count 110 (L) 164 - 446 K/uL    MPV 10.8 9.0 - 12.9 fL    Neutrophils-Polys 96.00 (H) 44.00 - " 72.00 %    Lymphocytes 1.00 (L) 22.00 - 41.00 %    Monocytes 0.00 0.00 - 13.40 %    Eosinophils 1.00 0.00 - 6.90 %    Basophils 0.00 0.00 - 1.80 %    Nucleated RBC 0.00 /100 WBC    Neutrophils (Absolute) 60.17 (H) 1.82 - 7.42 K/uL    Lymphs (Absolute) 0.61 (L) 1.00 - 4.80 K/uL    Monos (Absolute) 0.00 0.00 - 0.85 K/uL    Eos (Absolute) 0.61 (H) 0.00 - 0.51 K/uL    Baso (Absolute) 0.00 0.00 - 0.12 K/uL    NRBC (Absolute) 0.00 K/uL    Anisocytosis 1+     Macrocytosis 1+    COMP METABOLIC PANEL   Result Value Ref Range    Sodium 136 135 - 145 mmol/L    Potassium 4.1 3.6 - 5.5 mmol/L    Chloride 103 96 - 112 mmol/L    Co2 22 20 - 33 mmol/L    Anion Gap 11.0 7.0 - 16.0    Glucose 96 65 - 99 mg/dL    Bun 20 8 - 22 mg/dL    Creatinine 0.61 0.50 - 1.40 mg/dL    Calcium 8.8 8.4 - 10.2 mg/dL    AST(SGOT) 73 (H) 12 - 45 U/L    ALT(SGPT) 74 (H) 2 - 50 U/L    Alkaline Phosphatase 252 (H) 30 - 99 U/L    Total Bilirubin 0.7 0.1 - 1.5 mg/dL    Albumin 3.6 3.2 - 4.9 g/dL    Total Protein 6.4 6.0 - 8.2 g/dL    Globulin 2.8 1.9 - 3.5 g/dL    A-G Ratio 1.3 g/dL   LIPASE   Result Value Ref Range    Lipase 13 7 - 58 U/L   COVID/SARS CoV-2 PCR    Specimen: Nasopharyngeal; Respirate   Result Value Ref Range    COVID Order Status Received    SARS-CoV-2, PCR (In-House)   Result Value Ref Range    SARS-CoV-2 Source NP Swab     SARS-CoV-2 by PCR NotDetected    ESTIMATED GFR   Result Value Ref Range    GFR If African American >60 >60 mL/min/1.73 m 2    GFR If Non African American >60 >60 mL/min/1.73 m 2   DIFFERENTIAL MANUAL   Result Value Ref Range    Bands-Stabs 2.00 0.00 - 10.00 %    Manual Diff Status PERFORMED    PLATELET ESTIMATE   Result Value Ref Range    Plt Estimation Decreased    MORPHOLOGY   Result Value Ref Range    RBC Morphology Present    Magnesium   Result Value Ref Range    Magnesium 1.8 1.5 - 2.5 mg/dL       COURSE & MEDICAL DECISION MAKING  Patient presents to the ER history and physical as above.  I paged Dr. Shook.   Discussed case with him.  He tried calling the OR.  He is made arrangements for the patient to have stent exchange tomorrow.  I ordered preoperative data.  This demonstrated significant leukocytosis.  I consulted Dr. Nuno he has seen this sort of response to Neupogen injection.  At this point the patient is stable and has no symptoms.  He will be admitted for further treatment.    FINAL IMPRESSION  1.  Pancreatic cancer with biliary obstruction      This dictation was created using voice recognition software. The accuracy of the dictation is limited to the abilities of the software.  The nursing notes were reviewed and certain aspects of this information were incorporated into this note.      Electronically signed by: Jesu No M.D., 10/12/2020 9:00 AM

## 2020-10-12 NOTE — ASSESSMENT & PLAN NOTE
-Due to adenocarcinoma of pleural pancreatic head.  Reportedly, he gets his pancreatic stent replaced every 3 to 4 weeks.  Unfortunately, he has [employer], and they are unable to schedule him for stent replacement as outpatient.  -Dr. Marquis of GI has been consulted, who stated that he will be able to put him on the schedule for tomorrow.  -NPO at midnight tonight.

## 2020-10-12 NOTE — ED NOTES
Wilfrid  from Lab called with critical result of WBC 61.4 at 1008. Critical lab result read back to Wilfrid.   Dr. No  notified of critical lab result at 1008.  Critical lab result read back by Dr. No.

## 2020-10-12 NOTE — ASSESSMENT & PLAN NOTE
-Resume home lisinopril.  Monitor blood pressure trend closely with as needed IV Vasotec and labetalol for significant hypertension.

## 2020-10-13 ENCOUNTER — ANESTHESIA (OUTPATIENT)
Dept: SURGERY | Facility: MEDICAL CENTER | Age: 65
End: 2020-10-13
Payer: MEDICARE

## 2020-10-13 ENCOUNTER — APPOINTMENT (OUTPATIENT)
Dept: RADIOLOGY | Facility: MEDICAL CENTER | Age: 65
End: 2020-10-13
Attending: INTERNAL MEDICINE
Payer: MEDICARE

## 2020-10-13 ENCOUNTER — ANESTHESIA EVENT (OUTPATIENT)
Dept: SURGERY | Facility: MEDICAL CENTER | Age: 65
End: 2020-10-13
Payer: MEDICARE

## 2020-10-13 ENCOUNTER — PATIENT OUTREACH (OUTPATIENT)
Dept: HEALTH INFORMATION MANAGEMENT | Facility: OTHER | Age: 65
End: 2020-10-13

## 2020-10-13 VITALS
HEIGHT: 74 IN | DIASTOLIC BLOOD PRESSURE: 61 MMHG | HEART RATE: 68 BPM | WEIGHT: 140.65 LBS | BODY MASS INDEX: 18.05 KG/M2 | SYSTOLIC BLOOD PRESSURE: 123 MMHG | RESPIRATION RATE: 17 BRPM | OXYGEN SATURATION: 97 % | TEMPERATURE: 97.1 F

## 2020-10-13 LAB
ANION GAP SERPL CALC-SCNC: 10 MMOL/L (ref 7–16)
ANISOCYTOSIS BLD QL SMEAR: ABNORMAL
BASOPHILS # BLD AUTO: 0 % (ref 0–1.8)
BASOPHILS # BLD: 0 K/UL (ref 0–0.12)
BUN SERPL-MCNC: 17 MG/DL (ref 8–22)
CALCIUM SERPL-MCNC: 9 MG/DL (ref 8.4–10.2)
CHLORIDE SERPL-SCNC: 105 MMOL/L (ref 96–112)
CO2 SERPL-SCNC: 22 MMOL/L (ref 20–33)
CREAT SERPL-MCNC: 0.7 MG/DL (ref 0.5–1.4)
EOSINOPHIL # BLD AUTO: 0.54 K/UL (ref 0–0.51)
EOSINOPHIL NFR BLD: 1 % (ref 0–6.9)
ERYTHROCYTE [DISTWIDTH] IN BLOOD BY AUTOMATED COUNT: 54.4 FL (ref 35.9–50)
GLUCOSE SERPL-MCNC: 88 MG/DL (ref 65–99)
HCT VFR BLD AUTO: 34 % (ref 42–52)
HGB BLD-MCNC: 11.4 G/DL (ref 14–18)
LYMPHOCYTES # BLD AUTO: 0.54 K/UL (ref 1–4.8)
LYMPHOCYTES NFR BLD: 1 % (ref 22–41)
MACROCYTES BLD QL SMEAR: ABNORMAL
MANUAL DIFF BLD: NORMAL
MCH RBC QN AUTO: 33 PG (ref 27–33)
MCHC RBC AUTO-ENTMCNC: 33.5 G/DL (ref 33.7–35.3)
MCV RBC AUTO: 98.6 FL (ref 81.4–97.8)
MONOCYTES # BLD AUTO: 0 K/UL (ref 0–0.85)
MONOCYTES NFR BLD AUTO: 0 % (ref 0–13.4)
NEUTROPHILS # BLD AUTO: 53.12 K/UL (ref 1.82–7.42)
NEUTROPHILS NFR BLD: 95 % (ref 44–72)
NEUTS BAND NFR BLD MANUAL: 3 % (ref 0–10)
NRBC # BLD AUTO: 0 K/UL
NRBC BLD-RTO: 0 /100 WBC
PLATELET # BLD AUTO: 107 K/UL (ref 164–446)
PLATELET BLD QL SMEAR: NORMAL
PMV BLD AUTO: 11.5 FL (ref 9–12.9)
POTASSIUM SERPL-SCNC: 4.4 MMOL/L (ref 3.6–5.5)
RBC # BLD AUTO: 3.45 M/UL (ref 4.7–6.1)
RBC BLD AUTO: PRESENT
SODIUM SERPL-SCNC: 137 MMOL/L (ref 135–145)
WBC # BLD AUTO: 54.2 K/UL (ref 4.8–10.8)

## 2020-10-13 PROCEDURE — 85027 COMPLETE CBC AUTOMATED: CPT

## 2020-10-13 PROCEDURE — 110371 HCHG SHELL REV 272: Performed by: INTERNAL MEDICINE

## 2020-10-13 PROCEDURE — A9270 NON-COVERED ITEM OR SERVICE: HCPCS | Performed by: HOSPITALIST

## 2020-10-13 PROCEDURE — 500066 HCHG BITE BLOCK, ECT: Performed by: INTERNAL MEDICINE

## 2020-10-13 PROCEDURE — 700111 HCHG RX REV CODE 636 W/ 250 OVERRIDE (IP): Performed by: ANESTHESIOLOGY

## 2020-10-13 PROCEDURE — 74330 X-RAY BILE/PANC ENDOSCOPY: CPT

## 2020-10-13 PROCEDURE — C2617 STENT, NON-COR, TEM W/O DEL: HCPCS | Performed by: INTERNAL MEDICINE

## 2020-10-13 PROCEDURE — 700101 HCHG RX REV CODE 250: Performed by: INTERNAL MEDICINE

## 2020-10-13 PROCEDURE — 160048 HCHG OR STATISTICAL LEVEL 1-5: Performed by: INTERNAL MEDICINE

## 2020-10-13 PROCEDURE — 700102 HCHG RX REV CODE 250 W/ 637 OVERRIDE(OP): Performed by: HOSPITALIST

## 2020-10-13 PROCEDURE — A9270 NON-COVERED ITEM OR SERVICE: HCPCS | Performed by: INTERNAL MEDICINE

## 2020-10-13 PROCEDURE — 502240 HCHG MISC OR SUPPLY RC 0272: Performed by: INTERNAL MEDICINE

## 2020-10-13 PROCEDURE — 99217 PR OBSERVATION CARE DISCHARGE: CPT | Performed by: STUDENT IN AN ORGANIZED HEALTH CARE EDUCATION/TRAINING PROGRAM

## 2020-10-13 PROCEDURE — 160002 HCHG RECOVERY MINUTES (STAT): Performed by: INTERNAL MEDICINE

## 2020-10-13 PROCEDURE — 700111 HCHG RX REV CODE 636 W/ 250 OVERRIDE (IP): Performed by: STUDENT IN AN ORGANIZED HEALTH CARE EDUCATION/TRAINING PROGRAM

## 2020-10-13 PROCEDURE — 160035 HCHG PACU - 1ST 60 MINS PHASE I: Performed by: INTERNAL MEDICINE

## 2020-10-13 PROCEDURE — G0378 HOSPITAL OBSERVATION PER HR: HCPCS

## 2020-10-13 PROCEDURE — C1769 GUIDE WIRE: HCPCS | Performed by: INTERNAL MEDICINE

## 2020-10-13 PROCEDURE — 700105 HCHG RX REV CODE 258: Performed by: INTERNAL MEDICINE

## 2020-10-13 PROCEDURE — 160202 HCHG ENDO MINUTES - 1ST 30 MINS LEVEL 3: Performed by: INTERNAL MEDICINE

## 2020-10-13 PROCEDURE — 80048 BASIC METABOLIC PNL TOTAL CA: CPT

## 2020-10-13 PROCEDURE — 700102 HCHG RX REV CODE 250 W/ 637 OVERRIDE(OP): Performed by: INTERNAL MEDICINE

## 2020-10-13 PROCEDURE — 85007 BL SMEAR W/DIFF WBC COUNT: CPT

## 2020-10-13 PROCEDURE — 160207 HCHG ENDO MINUTES - EA ADDL 1 MIN LEVEL 3: Performed by: INTERNAL MEDICINE

## 2020-10-13 PROCEDURE — 160009 HCHG ANES TIME/MIN: Performed by: INTERNAL MEDICINE

## 2020-10-13 DEVICE — STENT BILIARY ADVANTIX 10FR X 7CM: Type: IMPLANTABLE DEVICE | Status: FUNCTIONAL

## 2020-10-13 RX ORDER — SODIUM CHLORIDE, SODIUM LACTATE, POTASSIUM CHLORIDE, CALCIUM CHLORIDE 600; 310; 30; 20 MG/100ML; MG/100ML; MG/100ML; MG/100ML
INJECTION, SOLUTION INTRAVENOUS CONTINUOUS
Status: CANCELLED | OUTPATIENT
Start: 2020-10-13

## 2020-10-13 RX ORDER — HALOPERIDOL 5 MG/ML
1 INJECTION INTRAMUSCULAR
Status: CANCELLED | OUTPATIENT
Start: 2020-10-13

## 2020-10-13 RX ORDER — SUCCINYLCHOLINE/SOD CL,ISO/PF 200MG/10ML
SYRINGE (ML) INTRAVENOUS PRN
Status: DISCONTINUED | OUTPATIENT
Start: 2020-10-13 | End: 2020-10-13 | Stop reason: SURG

## 2020-10-13 RX ORDER — HYDROMORPHONE HYDROCHLORIDE 1 MG/ML
0.2 INJECTION, SOLUTION INTRAMUSCULAR; INTRAVENOUS; SUBCUTANEOUS
Status: CANCELLED | OUTPATIENT
Start: 2020-10-13

## 2020-10-13 RX ORDER — HEPARIN SODIUM (PORCINE) LOCK FLUSH IV SOLN 100 UNIT/ML 100 UNIT/ML
500 SOLUTION INTRAVENOUS
Status: COMPLETED | OUTPATIENT
Start: 2020-10-13 | End: 2020-10-13

## 2020-10-13 RX ORDER — OXYCODONE HCL 5 MG/5 ML
10 SOLUTION, ORAL ORAL
Status: CANCELLED | OUTPATIENT
Start: 2020-10-13

## 2020-10-13 RX ORDER — HYDRALAZINE HYDROCHLORIDE 20 MG/ML
5 INJECTION INTRAMUSCULAR; INTRAVENOUS
Status: CANCELLED | OUTPATIENT
Start: 2020-10-13

## 2020-10-13 RX ORDER — SODIUM CHLORIDE, SODIUM LACTATE, POTASSIUM CHLORIDE, CALCIUM CHLORIDE 600; 310; 30; 20 MG/100ML; MG/100ML; MG/100ML; MG/100ML
1000 INJECTION, SOLUTION INTRAVENOUS
Status: DISCONTINUED | OUTPATIENT
Start: 2020-10-13 | End: 2020-10-13 | Stop reason: HOSPADM

## 2020-10-13 RX ORDER — LOPERAMIDE HYDROCHLORIDE 2 MG/1
2 CAPSULE ORAL 4 TIMES DAILY PRN
Status: DISCONTINUED | OUTPATIENT
Start: 2020-10-13 | End: 2020-10-13 | Stop reason: HOSPADM

## 2020-10-13 RX ORDER — MIDAZOLAM HYDROCHLORIDE 1 MG/ML
INJECTION INTRAMUSCULAR; INTRAVENOUS PRN
Status: DISCONTINUED | OUTPATIENT
Start: 2020-10-13 | End: 2020-10-13 | Stop reason: SURG

## 2020-10-13 RX ORDER — HYDROMORPHONE HYDROCHLORIDE 1 MG/ML
0.4 INJECTION, SOLUTION INTRAMUSCULAR; INTRAVENOUS; SUBCUTANEOUS
Status: CANCELLED | OUTPATIENT
Start: 2020-10-13

## 2020-10-13 RX ORDER — OXYCODONE HCL 5 MG/5 ML
5 SOLUTION, ORAL ORAL
Status: CANCELLED | OUTPATIENT
Start: 2020-10-13

## 2020-10-13 RX ORDER — ONDANSETRON 2 MG/ML
4 INJECTION INTRAMUSCULAR; INTRAVENOUS
Status: CANCELLED | OUTPATIENT
Start: 2020-10-13

## 2020-10-13 RX ORDER — HYDROMORPHONE HYDROCHLORIDE 1 MG/ML
0.1 INJECTION, SOLUTION INTRAMUSCULAR; INTRAVENOUS; SUBCUTANEOUS
Status: CANCELLED | OUTPATIENT
Start: 2020-10-13

## 2020-10-13 RX ORDER — DIPHENHYDRAMINE HYDROCHLORIDE 50 MG/ML
12.5 INJECTION INTRAMUSCULAR; INTRAVENOUS
Status: CANCELLED | OUTPATIENT
Start: 2020-10-13

## 2020-10-13 RX ORDER — PHENYLEPHRINE HCL IN 0.9% NACL 0.5 MG/5ML
SYRINGE (ML) INTRAVENOUS PRN
Status: DISCONTINUED | OUTPATIENT
Start: 2020-10-13 | End: 2020-10-13 | Stop reason: SURG

## 2020-10-13 RX ADMIN — LISINOPRIL 10 MG: 5 TABLET ORAL at 05:35

## 2020-10-13 RX ADMIN — OMEPRAZOLE 20 MG: 20 CAPSULE, DELAYED RELEASE ORAL at 05:34

## 2020-10-13 RX ADMIN — HEPARIN 500 UNITS: 100 SYRINGE at 18:26

## 2020-10-13 RX ADMIN — Medication 90 MG: at 13:26

## 2020-10-13 RX ADMIN — FENTANYL CITRATE 25 MCG: 50 INJECTION, SOLUTION INTRAMUSCULAR; INTRAVENOUS at 13:48

## 2020-10-13 RX ADMIN — Medication 150 MCG: at 13:39

## 2020-10-13 RX ADMIN — SODIUM CHLORIDE, POTASSIUM CHLORIDE, SODIUM LACTATE AND CALCIUM CHLORIDE 1000 ML: 600; 310; 30; 20 INJECTION, SOLUTION INTRAVENOUS at 12:51

## 2020-10-13 RX ADMIN — LOPERAMIDE HYDROCHLORIDE 2 MG: 2 CAPSULE ORAL at 09:16

## 2020-10-13 RX ADMIN — WATER 15 ML: 100 IRRIGANT IRRIGATION at 12:51

## 2020-10-13 RX ADMIN — PROPOFOL 140 MG: 10 INJECTION, EMULSION INTRAVENOUS at 13:26

## 2020-10-13 RX ADMIN — MIDAZOLAM HYDROCHLORIDE 2 MG: 1 INJECTION, SOLUTION INTRAMUSCULAR; INTRAVENOUS at 13:24

## 2020-10-13 RX ADMIN — FENTANYL CITRATE 25 MCG: 50 INJECTION, SOLUTION INTRAMUSCULAR; INTRAVENOUS at 13:24

## 2020-10-13 RX ADMIN — LOPERAMIDE HYDROCHLORIDE 2 MG: 2 CAPSULE ORAL at 02:28

## 2020-10-13 SDOH — ECONOMIC STABILITY: TRANSPORTATION INSECURITY
IN THE PAST 12 MONTHS, HAS THE LACK OF TRANSPORTATION KEPT YOU FROM MEDICAL APPOINTMENTS OR FROM GETTING MEDICATIONS?: NO

## 2020-10-13 SDOH — ECONOMIC STABILITY: FOOD INSECURITY: WITHIN THE PAST 12 MONTHS, THE FOOD YOU BOUGHT JUST DIDN'T LAST AND YOU DIDN'T HAVE MONEY TO GET MORE.: NEVER TRUE

## 2020-10-13 SDOH — ECONOMIC STABILITY: TRANSPORTATION INSECURITY
IN THE PAST 12 MONTHS, HAS LACK OF TRANSPORTATION KEPT YOU FROM MEETINGS, WORK, OR FROM GETTING THINGS NEEDED FOR DAILY LIVING?: NO

## 2020-10-13 SDOH — ECONOMIC STABILITY: INCOME INSECURITY: HOW HARD IS IT FOR YOU TO PAY FOR THE VERY BASICS LIKE FOOD, HOUSING, MEDICAL CARE, AND HEATING?: SOMEWHAT HARD

## 2020-10-13 SDOH — ECONOMIC STABILITY: FOOD INSECURITY: WITHIN THE PAST 12 MONTHS, YOU WORRIED THAT YOUR FOOD WOULD RUN OUT BEFORE YOU GOT MONEY TO BUY MORE.: NEVER TRUE

## 2020-10-13 NOTE — ANESTHESIA TIME REPORT
Anesthesia Start and Stop Event Times     Date Time Event    10/13/2020 1251 Ready for Procedure     1322 Anesthesia Start     1357 Anesthesia Stop        Responsible Staff  10/13/20    Name Role Begin End    Erik Carpenter M.D. Anesth 1322 1357        Preop Diagnosis (Free Text):  Pre-op Diagnosis     Choledocholithiasis [534804]        Preop Diagnosis (Codes):  Diagnosis Information     Diagnosis Code(s): Choledocholithiasis [K80.50]        Post op Diagnosis  Pancreatic cancer (HCC)      Premium Reason  Non-Premium    Comments:

## 2020-10-13 NOTE — CONSULTS
DATE OF SERVICE:  10/12/2020    REASON FOR CONSULTATION:  ERCP with stent exchange.      HISTORY OF PRESENT ILLNESS:  Patient is a pleasant 65-year-old male who has a   history of pancreatic adenocarcinoma and is having pancreatic duct stents   placed and exchanged every month approximately during his chemotherapy.    Patient for insurance reasons was unable to get this done as an outpatient and   was brought into the hospital to have this done.  Patient has felt ill since   last night after having salad.  However, prior to this, patient denied any   symptoms such as nausea, vomiting, diarrhea, fever, chills, chest pain,   shortness of breath, unintentional weight loss, blood in the stool, bloody   vomit.  Patient has felt relatively well up until the past day and is starting   to tolerate some food now.  Patient is currently on the schedule for tomorrow   for ERCP with pancreatic ductal stent exchange.      PAST MEDICAL HISTORY:  Pancreatic adenocarcinoma, GERD, hypertension, history   of seizures.      SOCIAL HISTORY:  Patient admits to tobacco use.  Patient admits to occasional   alcohol use and occasional marijuana use.      PAST SURGICAL HISTORY:  Patient has had numerous ERCPs in the past as well as   an EUS and elbow arthroscopy.      MEDICATIONS:  See med rec list.      ALLERGIES:  No known drug allergies.    FAMILY HISTORY:  Noncontributory.      PHYSICAL EXAMINATION:    GENERAL:  No acute distress, alert, awake, oriented x3.    VITAL SIGNS:  Stable.    HEENT:  PERRLA.  Extraocular movements intact.  Sclerae anicteric.    HEART:  Regular rate and rhythm.    LUNGS:  Clear to auscultation bilaterally.    ABDOMEN:  Soft and nontender.  No guarding or rebound.    EXTREMITIES:  No edema noted, bilateral lower extremities.    NEUROLOGIC:  Grossly intact.    PSYCHIATRIC:  Affect is  normal.    SKIN:  No jaundice or pallor.      LABORATORY DATA:  White count 61.4, hemoglobin 11, hematocrit 32.4, platelets   110.   AST 73, ALT 74, alkaline phosphatase 252, total bilirubin 0.7, albumin   3.6, CA 19-9 153.  COVID negative.      ASSESSMENT AND PLAN:    1.  Pancreatic cancer with biliary obstruction.  Plan for endoscopic   retrograde cholangiopancreatography tomorrow with pancreatic duct stent   exchange with Dr. Marquis.  Patient is on the schedule.  Please keep patient   n.p.o. after midnight.  Please hold any blood thinners such as Lovenox as the   patient was given recently.  We will make further recommendations based on   results of procedure tomorrow.    2.  Leukocytosis, possibly secondary to Neupogen injection, although quite   pronounced.    3.  Transaminitis, mild elevation at this time.  Patient will be having   endoscopic retrograde cholangiopancreatography tomorrow with pancreatic duct   stent exchange.    4.  We will follow along for now.  Please see procedure tomorrow for further   details.       ____________________________________     DO LE Gonzalez / MARÍA    DD:  10/12/2020 15:04:57  DT:  10/12/2020 17:27:51    D#:  [id number]  Job#:  [id number]

## 2020-10-13 NOTE — PROGRESS NOTES
Hospital Medicine Daily Progress Note    Date of Service  10/13/2020    Chief Complaint  65 y.o. male admitted 10/12/2020 with need for pancreatic stent placement in setting of Medical insurance and cannot have performed outpatient.    Hospital Course    65 y.o. male with hypertension, who was recently diagnosed with adenocarcinoma of the pancreatic head, and undergoing chemotherapy with last cycle last Thursday to Saturday.  He also received Neupogen injection yesterday.  He has a pancreatic stent in place, with first 1 placed 3 months ago and he reports that he gets that replaced every 3 to 4 weeks.  He was sent to the ED as he is due for a pancreatic stent replacement, and unfortunately it was unable to be scheduled as outpatient due to his insurance (medical).  Otherwise he has no complaints.  He had no fevers, chills, nausea vomiting, chest pain, shortness of breath, or cough.  He had no worsening jaundice.  He is not on any abdominal pain, and has no abdominal distention.  Since his chemotherapy, he noticed loose non-watery diarrhea without any hematochezia or melena.        ED course:  The patient was initially evaluated.  Vital signs were stable.  Initial blood work-up showed WBC count of 61,400 with no bandemia.  Electrolytes and renal function are normal.  AST was 73, ALT of 74, and alkaline phosphatase of 252.  Total bilirubin is normal.  GI (Dr. Marquis) was consulted, who will schedule him for pancreatic stent replacement for tomorrow.  Patient was subsequently admitted to the hospitalist service.      Interval Problem Update  -Stent at 1pm with GI  -Phone provided to patient to reach out to family  -Immodium given yesterday with improvement of his Diarrhea, will monitor.  -Patient has good insight into his illness, has possible early signs of depression, Palliative consult requested. Will consider antidepressant after procedure and patient diet  resumed.       Consultants/Specialty  Gastroenterology    Code Status  Full Code    Disposition  TBD    Review of Systems  ROS      Pertinent positives/negatives as mentioned above.      A complete review of systems was personally done by me. All other systems were negative.     Physical Exam  Temp:  [36.3 °C (97.3 °F)-36.8 °C (98.3 °F)] 36.4 °C (97.6 °F)  Pulse:  [65-78] 68  Resp:  [17-18] 18  BP: (110-146)/(62-83) 127/74  SpO2:  [94 %-100 %] 96 %    Physical Exam  Vitals signs reviewed.   Constitutional:       General: He is not in acute distress.     Appearance: Normal appearance. He is not toxic-appearing or diaphoretic.   HENT:      Head: Normocephalic and atraumatic.      Right Ear: External ear normal.      Left Ear: External ear normal.      Mouth/Throat:      Mouth: Mucous membranes are moist.      Pharynx: No oropharyngeal exudate.   Eyes:      General: No scleral icterus.     Extraocular Movements: Extraocular movements intact.      Conjunctiva/sclera: Conjunctivae normal.      Pupils: Pupils are equal, round, and reactive to light.   Neck:      Musculoskeletal: Normal range of motion and neck supple.   Cardiovascular:      Rate and Rhythm: Normal rate and regular rhythm.      Heart sounds: Normal heart sounds. No murmur. No gallop.    Pulmonary:      Effort: Pulmonary effort is normal. No respiratory distress.      Breath sounds: Normal breath sounds. No stridor. No wheezing, rhonchi or rales.   Chest:      Chest wall: No tenderness.   Abdominal:      General: Bowel sounds are normal. There is no distension.      Palpations: Abdomen is soft. There is no mass.      Tenderness: There is no abdominal tenderness. There is no guarding or rebound.   Musculoskeletal: Normal range of motion.         General: No swelling.      Right lower leg: No edema.      Left lower leg: No edema.   Lymphadenopathy:      Cervical: No cervical adenopathy.   Skin:     General: Skin is warm and dry.      Coloration: Skin is not  jaundiced.      Findings: No rash.   Neurological:      General: No focal deficit present.      Mental Status: He is alert and oriented to person, place, and time.      Cranial Nerves: No cranial nerve deficit.   Psychiatric:         Mood and Affect: Mood normal.         Behavior: Behavior normal.         Thought Content: Thought content normal.         Judgment: Judgment normal.      Fluids  No intake or output data in the 24 hours ending 10/13/20 0627    Laboratory  Recent Labs     10/12/20  0934 10/13/20  0340   WBC 61.4* 54.2*   RBC 3.34* 3.45*   HEMOGLOBIN 11.0* 11.4*   HEMATOCRIT 32.4* 34.0*   MCV 97.0 98.6*   MCH 32.9 33.0   MCHC 34.0 33.5*   RDW 54.4* 54.4*   PLATELETCT 110* 107*   MPV 10.8 11.5     Recent Labs     10/12/20  0934 10/13/20  0340   SODIUM 136 137   POTASSIUM 4.1 4.4   CHLORIDE 103 105   CO2 22 22   GLUCOSE 96 88   BUN 20 17   CREATININE 0.61 0.70   CALCIUM 8.8 9.0                   Imaging  No orders to display        Assessment/Plan  Obstruction of pancreatic duct- (present on admission)  Assessment & Plan  -Due to adenocarcinoma of pleural pancreatic head.  Reportedly, he gets his pancreatic stent replaced every 3 to 4 weeks.  Unfortunately, he has [employer], and they are unable to schedule him for stent replacement as outpatient.  -Dr. Marquis of GI has been consulted, who stated that he will be able to put him on the schedule for tomorrow.  -NPO at midnight tonight.     Leukocytosis- (present on admission)  Assessment & Plan  -This is likely related to neupogen/granix which per the patient he received yesterday following his cycle of chemotherapy.  Currently he does not have any signs of infection.  Monitor closely, and trend WBC count.  Watch for fevers.    Pancreatic cancer (HCC)- (present on admission)  Assessment & Plan  -Had cycle of chemotherapy last week, he received Neupogen yesterday.  -Pancreatic stent replacement planned for tomorrow.  -Outpatient oncology follow-up.    Essential  hypertension- (present on admission)  Assessment & Plan  -Resume home lisinopril.  Monitor blood pressure trend closely with as needed IV Vasotec and labetalol for significant hypertension.         VTE prophylaxis: Lovenox (held prior to procedure, to resume)

## 2020-10-13 NOTE — ANESTHESIA PROCEDURE NOTES
Airway    Date/Time: 10/13/2020 1:27 PM  Performed by: Erik Carpenter M.D.  Authorized by: Erik Carpenter M.D.     Location:  OR  Urgency:  Elective  Indications for Airway Management:  Anesthesia      Spontaneous Ventilation: absent    Sedation Level:  Deep  Preoxygenated: Yes    Patient Position:  Sniffing  Final Airway Type:  Endotracheal airway  Final Endotracheal Airway:  ETT  Cuffed: Yes    Technique Used for Successful ETT Placement:  Direct laryngoscopy    Insertion Site:  Oral  Blade Type:  [place]  Laryngoscope Blade/Videolaryngoscope Blade Size:  2  ETT Size (mm):  7.5  Measured from:  Lips  Placement Verified by: auscultation and capnometry    Cormack-Lehane Classification:  Grade I - full view of glottis  Number of Attempts at Approach:  1

## 2020-10-13 NOTE — PROGRESS NOTES
Haresh from Lab called with critical result of WBC 54.2 at 0549. Critical lab result read back to Haresh.   Dr. Platt notified of critical lab result at 0558.  Critical lab result read back by Dr. Platt.

## 2020-10-13 NOTE — PROGRESS NOTES
Pt returned to unit, VSS. Pudding provided. No needs at this time. Call light in reach will continue to monitor.

## 2020-10-13 NOTE — PROGRESS NOTES
2 RN Skin Check    2 RN skin check complete.   Devices in place: left chest port.  Skin assessed under devices: NA.  Confirmed pressure ulcers found on: NA.  New potential pressure ulcers noted on NA. Wound consult placed N/A.  The following interventions in place NA.

## 2020-10-13 NOTE — CARE PLAN
Problem: Communication  Goal: The ability to communicate needs accurately and effectively will improve  Outcome: PROGRESSING AS EXPECTED  Note: Pt verbalizes needs, call light in reach.      Problem: Bowel/Gastric:  Goal: Normal bowel function is maintained or improved  Outcome: PROGRESSING AS EXPECTED  Note: Pt reports nausea related to chemo therapy, declines medication at this time. Will reassess

## 2020-10-13 NOTE — CARE PLAN
Problem: Communication  Goal: The ability to communicate needs accurately and effectively will improve  Outcome: PROGRESSING AS EXPECTED  Note: Pt verbalizes needs, call light in reach.      Problem: Bowel/Gastric:  Goal: Normal bowel function is maintained or improved  Outcome: PROGRESSING AS EXPECTED  Note: Pt reported nausea and diarrhea, imodium given per MAR.

## 2020-10-13 NOTE — PROGRESS NOTES
YONATAN Peralta met with pt Bedside to introduce CCM services. Completed SDOH screening and inpatient assessment. Pt states he is from [place], [place] and was receiving chemo and infusion treatment there but treatment center ran out of funding and he was told to come to [place] for continued care. Pt states he does not have a PCP currently established here or back home in [place]. He declined the assistance with scheduling follow up visit at this time. He states he is currently staying with his daughter while receiving treatment in [place]. Pt was unclear if he is wanting to become a resident in [place]. He also states he receives 1,000.00 a month in social security and is not using medical equipment at home. He is declining all CCM resources at this time but would like a follow up call post d/c from [facility]. CCM contact info left with pt bedside.       Community Health Worker Intake  • Social determinates of health intake completed   • Identified barriers to none.   • Contact information provided to Janes Cody . Yes   • Has PCP appointment scheduled for. None pt declined at this time as he is receiving Chemo/infusion therapy.   • Scheduled Food Delivery/Home Visit/Outpatient Visit: Declined   • Accepted/Declined Med's-To-Beds. Yes   • Inpatient/Outpatient assessment completed. Inpatient   • Did the patient receive medications post discharge: Yes    Plan:  Follow up call to pt post d/C to verify he would like follow up appointment scheduled/ possible food Rx assistance.

## 2020-10-13 NOTE — CARE PLAN
Problem: Safety  Goal: Will remain free from injury  Outcome: PROGRESSING AS EXPECTED  Note: Remind patient to use call light and provide assistance. Bed in low position, bed locked, and appropriate alarms set. Patient wearing non-slip socks. Call light and personal belongings are within reach.     Problem: Knowledge Deficit  Goal: Knowledge of disease process/condition, treatment plan, diagnostic tests, and medications will improve  Outcome: PROGRESSING AS EXPECTED  Note: Encourage patient to ask questions and be involved in plan of care. Provide education on treatment plan, diagnostic testing, and medications; have patient verbalize understanding.

## 2020-10-13 NOTE — ANESTHESIA PREPROCEDURE EVALUATION
Pancreatic cancer    Relevant Problems   CARDIAC   (+) Essential hypertension       Physical Exam    Airway    Cardiovascular    Dental   (+) upper dentures, lower dentures           Pulmonary    Abdominal    Neurological              Anesthesia Plan    ASA 3   ASA physical status 3 criteria: other (comment)    Plan - general       Airway plan will be ETT                  Informed Consent:

## 2020-10-13 NOTE — OP REPORT
DATE OF SERVICE:  10/13/2020     PROCEDURE PERFORMED:  Endoscopic retrograde cholangiography with   Stone extraction and stent replacement    CONSENT:  Procedure risks and benefits reviewed thoroughly with the patient,   risks including but not limited to bleeding, perforation, side effects of medication were informed.  Patient voiced understanding and agreed to proceed.  Additional risks inherent to ERCP that being mild, moderate, severe pancreatitis that could lead to postprocedural pain, prolonged hospitalization, intensive care unit stay, and/or death were reviewed with the patient who voiced understanding and agreed to proceed.     PREPROCEDURE DIAGNOSIS:  pancreatic cancer, biliary obstruction, stent malfunction     POSTPROCEDURE DIAGNOSES:  pancreatic cancer, biliary obstruction, stent malfunction, choledocholithiasis     PHYSICIAN: Melvin Marquis MD        DESCRIPTION OF PROCEDURE:  The patient was placed in a prone position after intubation and sedation.  A bite block was inserted in the mouth and a side-viewing duodenoscope was passed carefully and easily under semi-direct visualization into the esophagus and passed through the stomach to the duodenum. Upon reaching the second portion of the duodenum, the scope was withdrawn to the short-position.The ampulla was identified. The previously placed stent was grasped using a snare and removed from the bile duct in its entirety    Using a extraction balloon cannula preloaded with a 0.035 wire, the CBD was cannulated. The wire was advanced under fluoroscopy to the level of the intrahepatics. The cannula was advanced over the wire. Bile was aspirated and then contrast was injected to obtain a cholangiogram. The cholangiogram reveal a 1cm long malignant appearing biliary stricture. There were filling defects in the distal duct suggestive of stones. The balloon was then inflated and balloon dredges were performed with extraction of 2 black stones. Next, given that  there was still a malignant stricture present, a stent was placed to maintain biliary patency. A 10F x 7cm plastic stent was selected and successfully placed in the duct bridging the stricture. There was brisk flow of bile and contrast from the stent at deployment. All instruments were then removed and the procedure was completed.         COMPLICATIONS:  None.     BLOOD LOSS:  None.     SPECIMENS:  None.    SUMMARY:  1.) ERCP with stent removal   2.) Biliary cannulation with cholangiogram revealing a 1cm malignant stricture and distal CBD stones  3.) Successful stone extraction and stent replacement     RECOMMENDATIONS:   1.) Monitor LFTs. Consider stent change in 8 weeks depending on whether patient is a whipple candidate  2.) Restart diet  3.) Ok to discharge home. Please call [facility] if new questions

## 2020-10-13 NOTE — PROGRESS NOTES
Completed assessment. Reinforced NPO status to start at midnight. Bed in low position, locked, and appropriate alarms set. Personal belongings and call light are within reach. Patient provided with Jello and orange juice. Patient has no additional needs at this time.

## 2020-10-14 ENCOUNTER — PATIENT OUTREACH (OUTPATIENT)
Dept: HEALTH INFORMATION MANAGEMENT | Facility: OTHER | Age: 65
End: 2020-10-14

## 2020-10-14 NOTE — DISCHARGE SUMMARY
Discharge Summary    CHIEF COMPLAINT ON ADMISSION  Chief Complaint   Patient presents with   • Sent by MD     possible stent placement       Reason for Admission  stent replacement     Admission Date  10/12/2020    CODE STATUS  Full Code    HPI & HOSPITAL COURSE    65 y.o. male with hypertension, who was recently diagnosed with adenocarcinoma of the pancreatic head, and undergoing chemotherapy with last cycle last Thursday to Saturday.  He also received Neupogen injection yesterday.  He has a pancreatic stent in place, with first 1 placed 3 months ago and he reports that he gets that replaced every 3 to 4 weeks.  He was sent to the ED as he is due for a pancreatic stent replacement, and unfortunately it was unable to be scheduled as outpatient due to his insurance (medical).  Otherwise he has no complaints.  He had no fevers, chills, nausea vomiting, chest pain, shortness of breath, or cough.  He had no worsening jaundice.  He is not on any abdominal pain, and has no abdominal distention.  Since his chemotherapy, he noticed loose non-watery diarrhea without any hematochezia or melena.        ED course:  The patient was initially evaluated.  Vital signs were stable.  Initial blood work-up showed WBC count of 61,400 with no bandemia.  Electrolytes and renal function are normal.  AST was 73, ALT of 74, and alkaline phosphatase of 252.  Total bilirubin is normal.  GI (Dr. Marquis) was consulted, who will schedule him for pancreatic stent replacement for tomorrow.  Patient was subsequently admitted to the hospitalist service.      Hospital Course: Pancreatic Stent was replaced by GI, for outpatient follow up. Patient tolerated procedure well and he was cleared by GI for discharge with close follow up with PMD and GI.      Therefore, he is discharged in fair and stable condition to home with close outpatient follow-up.    The patient met 2-midnight criteria for an inpatient stay at the time of discharge.    Discharge  Date  10/13/2020    FOLLOW UP ITEMS POST DISCHARGE  Consider stent change in 8 weeks depending on whether patient is a whipple candidate.    DISCHARGE DIAGNOSES  Active Problems:    Obstruction of pancreatic duct POA: Yes    Leukocytosis POA: Yes    Essential hypertension POA: Yes    Pancreatic cancer (HCC) POA: Yes  Resolved Problems:    * No resolved hospital problems. *      FOLLOW UP  Future Appointments   Date Time Provider Department Center   10/20/2020  3:00 PM INFUSION QUICK INJECT ONP [facility] [address]   10/21/2020  9:30 AM [facility] INFUSION ONP [facility] [address]   10/23/2020  4:00 PM INFUSION QUICK INJECT ON [facility] [address]   10/24/2020  4:00 PM INFUSION QUICK INJECT ON [facility]         In 3 days      Melvin Marquis M.D.  [address] [place] [address]  [phone]    In 3 days        MEDICATIONS ON DISCHARGE     Medication List      CONTINUE taking these medications      Instructions   lisinopril 10 MG Tabs  Commonly known as: PRINIVIL   Take 10 mg by mouth every day. Indications: High Blood Pressure Disorder  Dose: 10 mg     ondansetron 8 MG Tabs  Commonly known as: ZOFRAN      oxyCODONE HCl 5 MG Taba   Take  by mouth as needed.     pantoprazole 40 MG Tbec  Commonly known as: PROTONIX   Take 40 mg by mouth every day.  Dose: 40 mg     prochlorperazine 10 MG Tabs  Commonly known as: COMPAZINE      tamsulosin 0.4 MG capsule  Commonly known as: FLOMAX   Take 0.4 mg by mouth ONE-HALF HOUR AFTER BREAKFAST.  Dose: 0.4 mg            Allergies  No Known Allergies    DIET  Orders Placed This Encounter   Procedures   • Diet Order Regular     Standing Status:   Standing     Number of Occurrences:   1     Order Specific Question:   Diet:     Answer:   Regular [1]       ACTIVITY  As tolerated.  Weight bearing as tolerated    CONSULTATIONS  GI    PROCEDURES  Pancreatic Stent Placement    LABORATORY  Lab Results   Component Value Date    SODIUM 137 10/13/2020    POTASSIUM 4.4 10/13/2020    CHLORIDE 105 10/13/2020    CO2 22  10/13/2020    GLUCOSE 88 10/13/2020    BUN 17 10/13/2020    CREATININE 0.70 10/13/2020        Lab Results   Component Value Date    WBC 54.2 (HH) 10/13/2020    HEMOGLOBIN 11.4 (L) 10/13/2020    HEMATOCRIT 34.0 (L) 10/13/2020    PLATELETCT 107 (L) 10/13/2020        Total time of the discharge process exceeds 30 minutes.

## 2020-10-14 NOTE — PROGRESS NOTES
Discharge order written. IV removed, patient tolerated. All personal belongings are in possession. AVS printed, reviewed and copy signed and placed on the chart. Patient has no further questions.  Discharged in satisfactory condition.PT left unit with self via walking . Staff escort  RN.

## 2020-10-14 NOTE — DISCHARGE INSTRUCTIONS
Discharge Instructions    Discharged to home by car with relative. Discharged via wheelchair, hospital escort: Yes.  Special equipment needed: Not Applicable    Be sure to schedule a follow-up appointment with your primary care doctor or any specialists as instructed.     Discharge Plan:   Diet Plan: Discussed  Activity Level: Discussed  Confirmed Follow up Appointment: Patient to Call and Schedule Appointment  Confirmed Symptoms Management: Discussed  Medication Reconciliation Updated: Yes  Influenza Vaccine Indication: Patient Refuses    I understand that a diet low in cholesterol, fat, and sodium is recommended for good health. Unless I have been given specific instructions below for another diet, I accept this instruction as my diet prescription.   Other diet: regular    Special Instructions: None    · Is patient discharged on Warfarin / Coumadin?   No     Depression / Suicide Risk    As you are discharged from this [facility] facility, it is important to learn how to keep safe from harming yourself.    Recognize the warning signs:  · Abrupt changes in personality, positive or negative- including increase in energy   · Giving away possessions  · Change in eating patterns- significant weight changes-  positive or negative  · Change in sleeping patterns- unable to sleep or sleeping all the time   · Unwillingness or inability to communicate  · Depression  · Unusual sadness, discouragement and loneliness  · Talk of wanting to die  · Neglect of personal appearance   · Rebelliousness- reckless behavior  · Withdrawal from people/activities they love  · Confusion- inability to concentrate     If you or a loved one observes any of these behaviors or has concerns about self-harm, here's what you can do:  · Talk about it- your feelings and reasons for harming yourself  · Remove any means that you might use to hurt yourself (examples: pills, rope, extension cords, firearm)  · Get professional help from the community  (Mental Health, Substance Abuse, psychological counseling)  · Do not be alone:Call your Safe Contact- someone whom you trust who will be there for you.  · Call your local CRISIS HOTLINE 533-1523 or 364-484-4892  · Call your local Children's Mobile Crisis Response Team [place] (273) 166-3935 or www.[place]  · Call the toll free National Suicide Prevention Hotlines   · National Suicide Prevention Lifeline 618-942-FYMS (2281)  · National CCBR-SYNARC Line Network 800-SUICIDE (809-2828)          >Follow up with PMD in 2-3 days to trend/monitor Liver Function Panel.  >Follow up with GI Dr. Melvin Marquis, will consider stent change in 8 weeks.

## 2020-10-19 RX ORDER — 0.9 % SODIUM CHLORIDE 0.9 %
3 VIAL (ML) INJECTION PRN
Status: CANCELLED | OUTPATIENT
Start: 2020-10-20

## 2020-10-19 RX ORDER — 0.9 % SODIUM CHLORIDE 0.9 %
10 VIAL (ML) INJECTION PRN
Status: CANCELLED | OUTPATIENT
Start: 2020-10-20

## 2020-10-19 RX ORDER — HEPARIN SODIUM (PORCINE) LOCK FLUSH IV SOLN 100 UNIT/ML 100 UNIT/ML
500 SOLUTION INTRAVENOUS PRN
Status: CANCELLED | OUTPATIENT
Start: 2020-10-20

## 2020-10-19 RX ORDER — 0.9 % SODIUM CHLORIDE 0.9 %
VIAL (ML) INJECTION PRN
Status: CANCELLED | OUTPATIENT
Start: 2020-10-20

## 2020-10-19 NOTE — PROGRESS NOTES
10/19/2020- YONATAN Peralta contacted pt for follow up call. Pt states he will contact if CCM services are needed. He thanked me for the call. Pt will be d/c from CCM services as all needs met.

## 2020-10-20 ENCOUNTER — OUTPATIENT INFUSION SERVICES (OUTPATIENT)
Dept: ONCOLOGY | Facility: MEDICAL CENTER | Age: 65
End: 2020-10-20
Attending: INTERNAL MEDICINE
Payer: MEDICARE

## 2020-10-20 VITALS
HEIGHT: 70 IN | TEMPERATURE: 97.2 F | SYSTOLIC BLOOD PRESSURE: 128 MMHG | OXYGEN SATURATION: 95 % | HEART RATE: 54 BPM | RESPIRATION RATE: 18 BRPM | DIASTOLIC BLOOD PRESSURE: 85 MMHG | BODY MASS INDEX: 19.98 KG/M2 | WEIGHT: 139.55 LBS

## 2020-10-20 DIAGNOSIS — C25.0 MALIGNANT NEOPLASM OF HEAD OF PANCREAS (HCC): ICD-10-CM

## 2020-10-20 LAB
ALBUMIN SERPL BCP-MCNC: 3.6 G/DL (ref 3.2–4.9)
ALBUMIN/GLOB SERPL: 1.5 G/DL
ALP SERPL-CCNC: 296 U/L (ref 30–99)
ALT SERPL-CCNC: 61 U/L (ref 2–50)
ANION GAP SERPL CALC-SCNC: 8 MMOL/L (ref 7–16)
ANISOCYTOSIS BLD QL SMEAR: ABNORMAL
AST SERPL-CCNC: 40 U/L (ref 12–45)
BASOPHILS # BLD AUTO: 0 % (ref 0–1.8)
BASOPHILS # BLD: 0 K/UL (ref 0–0.12)
BILIRUB SERPL-MCNC: 0.3 MG/DL (ref 0.1–1.5)
BUN SERPL-MCNC: 12 MG/DL (ref 8–22)
CALCIUM SERPL-MCNC: 8.4 MG/DL (ref 8.5–10.5)
CANCER AG19-9 SERPL-ACNC: 150 U/ML (ref 0–35)
CHLORIDE SERPL-SCNC: 106 MMOL/L (ref 96–112)
CO2 SERPL-SCNC: 23 MMOL/L (ref 20–33)
CREAT SERPL-MCNC: 0.73 MG/DL (ref 0.5–1.4)
DACRYOCYTES BLD QL SMEAR: NORMAL
EOSINOPHIL # BLD AUTO: 0.09 K/UL (ref 0–0.51)
EOSINOPHIL NFR BLD: 0.9 % (ref 0–6.9)
ERYTHROCYTE [DISTWIDTH] IN BLOOD BY AUTOMATED COUNT: 59.3 FL (ref 35.9–50)
GLOBULIN SER CALC-MCNC: 2.4 G/DL (ref 1.9–3.5)
GLUCOSE SERPL-MCNC: 126 MG/DL (ref 65–99)
HCT VFR BLD AUTO: 31.1 % (ref 42–52)
HGB BLD-MCNC: 10.6 G/DL (ref 14–18)
LYMPHOCYTES # BLD AUTO: 0.82 K/UL (ref 1–4.8)
LYMPHOCYTES NFR BLD: 7.8 % (ref 22–41)
MACROCYTES BLD QL SMEAR: ABNORMAL
MANUAL DIFF BLD: NORMAL
MCH RBC QN AUTO: 33.8 PG (ref 27–33)
MCHC RBC AUTO-ENTMCNC: 34.1 G/DL (ref 33.7–35.3)
MCV RBC AUTO: 99 FL (ref 81.4–97.8)
MONOCYTES # BLD AUTO: 0.09 K/UL (ref 0–0.85)
MONOCYTES NFR BLD AUTO: 0.9 % (ref 0–13.4)
MORPHOLOGY BLD-IMP: NORMAL
NEUTROPHILS # BLD AUTO: 9.49 K/UL (ref 1.82–7.42)
NEUTROPHILS NFR BLD: 86.1 % (ref 44–72)
NEUTS BAND NFR BLD MANUAL: 4.3 % (ref 0–10)
NRBC # BLD AUTO: 0 K/UL
NRBC BLD-RTO: 0 /100 WBC
PLATELET # BLD AUTO: 114 K/UL (ref 164–446)
PLATELET BLD QL SMEAR: NORMAL
PMV BLD AUTO: 10.8 FL (ref 9–12.9)
POIKILOCYTOSIS BLD QL SMEAR: NORMAL
POTASSIUM SERPL-SCNC: 3.7 MMOL/L (ref 3.6–5.5)
PROT SERPL-MCNC: 6 G/DL (ref 6–8.2)
RBC # BLD AUTO: 3.14 M/UL (ref 4.7–6.1)
RBC BLD AUTO: PRESENT
SODIUM SERPL-SCNC: 137 MMOL/L (ref 135–145)
WBC # BLD AUTO: 10.5 K/UL (ref 4.8–10.8)

## 2020-10-20 PROCEDURE — 85027 COMPLETE CBC AUTOMATED: CPT

## 2020-10-20 PROCEDURE — 700111 HCHG RX REV CODE 636 W/ 250 OVERRIDE (IP): Performed by: INTERNAL MEDICINE

## 2020-10-20 PROCEDURE — 80053 COMPREHEN METABOLIC PANEL: CPT

## 2020-10-20 PROCEDURE — A4212 NON CORING NEEDLE OR STYLET: HCPCS

## 2020-10-20 PROCEDURE — 85007 BL SMEAR W/DIFF WBC COUNT: CPT

## 2020-10-20 PROCEDURE — 36591 DRAW BLOOD OFF VENOUS DEVICE: CPT

## 2020-10-20 PROCEDURE — 86301 IMMUNOASSAY TUMOR CA 19-9: CPT

## 2020-10-20 RX ORDER — HEPARIN SODIUM (PORCINE) LOCK FLUSH IV SOLN 100 UNIT/ML 100 UNIT/ML
500 SOLUTION INTRAVENOUS PRN
Status: DISCONTINUED | OUTPATIENT
Start: 2020-10-20 | End: 2020-10-20 | Stop reason: HOSPADM

## 2020-10-20 RX ADMIN — HEPARIN 500 UNITS: 100 SYRINGE at 15:24

## 2020-10-20 ASSESSMENT — FIBROSIS 4 INDEX: FIB4 SCORE: 5.23

## 2020-10-20 NOTE — PROGRESS NOTES
"Pharmacy Chemotherapy Calculation    Patient Name: Janes Cody   Dx: Pancreatic Cancer    Protocol: mFOLFIRINOX     *Dosing Reference*  Oxaliplatin 85 mg/m2 IV over 2 hours on Day 1 followed by  10/28/20- dose reduced  By 20% to 68mg/m2 for tolerance  Irinotecan 150 mg/m2 IV over 90 minutes on Day 1 given concurrently with   10/28/20- dose reduced  By 20% to 120mg/m2 for tolerance    10/28/20- discontinued from therapy for tolerance  followed by   Fluorouracil 2400 mg/m2 CIVI over 46 hours    14 day cycle for 12 cycles  Ronald NGUYEN et al. N Engl J Med. 58526;364(91):1819-86    Allergies:  Patient has no known allergies.     BP (!) 164/89   Pulse 64   Temp 36.2 °C (97.1 °F) (Temporal)   Resp 18   Ht 1.785 m (5' 10.28\")   Wt 67.4 kg (148 lb 9.4 oz)   SpO2 100%   BMI 21.15 kg/m²  Body surface area is 1.83 meters squared.    All lab results 10/28/20 within treatment parameters.      Drug Order   (Drug name, dose, route, IV Fluid & volume, frequency, number of doses) Cycle 6- delayed 1 week  Previous treatment: C5 on 10/8/20     Medication = Oxaliplatin (Eloxatin)  Base Dose= 68mg/m2  Calc Dose: Base Dose x 1.83m2 = 124.4mg  Final Dose = 124.44mg  Route = IV  Fluid & Volume = D5W 250 mL  Admin Duration = Over 2 hours          <10% difference, okay to treat with final dose   Medication = Irinotecan (Camptosar)  Base Dose= 120mg/m2  Calc Dose: Base Dose x 1.83m2 = 219.6mg  Final Dose = 219.6 mg  Route = IV  Fluid & Volume = D5W 500 mL  Admin Duration = Over 90 minutes   Run concurrently with Leucovorin       <10% difference, okay to treat with final dose   Medication = Fluorouracil   Base Dose= 2400 mg/m2   Calc Dose: Base Dose x 1.83m2 = 4392mg  Final Dose = 4390mg  Route = CIVI  Fluid & Volume = 87.8mL (+ 3 mL of overfill)  Conc. = 50 mg/mL  Admin Duration = Over 46 hours @ 1.9mL/hr   Via CADD pump for home infusion       <10% difference, okay to treat with final dose     By my signature below, I " confirm this process was performed independently with the BSA and all final chemotherapy dosing calculations congruent. I have reviewed the above chemotherapy order and that my calculation of the final dose and BSA (when applicable) corroborate those calculations of the  pharmacist. Discrepancies of 10% or greater in the written dose have been addressed and documented within the EPIC Progress notes.    Edilberto Garcia, PharmD

## 2020-10-20 NOTE — PROGRESS NOTES
Pt presented to [facility] for lab draw. LCW port accessed in sterile fashion. Flushed easily, adina briskly. CBC, CMP & CA 19.9 drawn and sent to lab. Port flushed with NS and Heparin per protocol, then de-accessed. Wong needle tip remained intact. Gauze dressing to access site. Pt left [facility] in NAD. Next appointment time confirmed prior to departure.

## 2020-10-27 RX ORDER — 0.9 % SODIUM CHLORIDE 0.9 %
3 VIAL (ML) INJECTION PRN
Status: CANCELLED | OUTPATIENT
Start: 2020-10-27

## 2020-10-27 RX ORDER — 0.9 % SODIUM CHLORIDE 0.9 %
VIAL (ML) INJECTION PRN
Status: CANCELLED | OUTPATIENT
Start: 2020-10-27

## 2020-10-27 RX ORDER — 0.9 % SODIUM CHLORIDE 0.9 %
10 VIAL (ML) INJECTION PRN
Status: CANCELLED | OUTPATIENT
Start: 2020-10-27

## 2020-10-27 RX ORDER — HEPARIN SODIUM (PORCINE) LOCK FLUSH IV SOLN 100 UNIT/ML 100 UNIT/ML
500 SOLUTION INTRAVENOUS PRN
Status: CANCELLED | OUTPATIENT
Start: 2020-10-27

## 2020-10-27 NOTE — PROGRESS NOTES
"Pharmacy Chemotherapy Verification  Dx: pancreatic cancer    Cycle 6 - delayed 1 week  Previous treatment = 10/8/20    Protocol: FOLFIRINOX     *Dosing Reference*  OXALIplatin 85 mg/m2 IV over 2 hrs on Day 1   -20% dose reduction to 68 mg/m2 starting Cycle 6 for tolerance per MD  Irinotecan  IV over 90 min on Day 1    - 8/12/20: dose reduced to  (25% reduction) for the dose on 8/12/20 only   - 8/26/20 C2 Irino dose 150 mg/m2   -20% dose reduction to 120 mg/m2 starting Cycle 6 for tolerance per MD  Leucovorin 400 mg/m2 IV over 90 min concurrent with Irinotecan on Day 1   -Removed from treatment starting Cycle 6   deleted for tolerance  Fluorouracil 1200 mg/m2 CIVI over 24 hrs on Days 1 and 2 (2400 mg/m2 IV over 46 hours)  14-day cycle x 4-12 cycles or until DP/UT (metastatic)  NCCN Guidelines for Pancreatic Adenocarcinoma V.2.2016  Ronald NGUYEN, et al - N Engl J Med. 2011 May 12;364(59):181-25. doi: 10.1056/[id number].    Allergies: No Known Allergies  BP (!) 164/89   Pulse 64   Temp 36.2 °C (97.1 °F) (Temporal)   Resp 18   Ht 1.785 m (5' 10.28\")   Wt 67.4 kg (148 lb 9.4 oz)   SpO2 100%   BMI 21.15 kg/m²     Body surface area is 1.83 meters squared.    Labs 10/28/20  ANC 1720 Hgb 9.8 Plt 121k  SCr 0.57 CrCl 98.8 mL/min   AST/ALT/AP = 38/43/220 Tbili = 0.3    OXALIplatin (Eloxatin) 68 mg/m2 x 1.83 m2 = 124.44 mg    <10% difference, ok to treat with final dose = 124.44 mg IV    Irinotecan (Camptosar) 120 mg/m2 x 1.83 m2 = 219.6 mg    <10% difference, ok to treat with final dose = 219.6 mg IV    Fluorouracil (Adrucil) 2400 mg/m2 x 1.83 m2 = 4392 mg    <10% difference, ok to treat with final dose = 4390 mg CIVI over 46 hrs @ 1.9 mL/hr     Maggie Licea, PharmD, BCOP    "

## 2020-10-28 ENCOUNTER — OUTPATIENT INFUSION SERVICES (OUTPATIENT)
Dept: ONCOLOGY | Facility: MEDICAL CENTER | Age: 65
End: 2020-10-28
Attending: INTERNAL MEDICINE
Payer: MEDICARE

## 2020-10-28 VITALS
OXYGEN SATURATION: 100 % | TEMPERATURE: 97.1 F | HEART RATE: 64 BPM | HEIGHT: 70 IN | DIASTOLIC BLOOD PRESSURE: 89 MMHG | RESPIRATION RATE: 18 BRPM | BODY MASS INDEX: 21.27 KG/M2 | WEIGHT: 148.59 LBS | SYSTOLIC BLOOD PRESSURE: 164 MMHG

## 2020-10-28 DIAGNOSIS — C25.0 MALIGNANT NEOPLASM OF HEAD OF PANCREAS (HCC): ICD-10-CM

## 2020-10-28 LAB
ALBUMIN SERPL BCP-MCNC: 3.5 G/DL (ref 3.2–4.9)
ALBUMIN/GLOB SERPL: 1.6 G/DL
ALP SERPL-CCNC: 220 U/L (ref 30–99)
ALT SERPL-CCNC: 43 U/L (ref 2–50)
ANION GAP SERPL CALC-SCNC: 8 MMOL/L (ref 7–16)
AST SERPL-CCNC: 38 U/L (ref 12–45)
BASOPHILS # BLD AUTO: 0.6 % (ref 0–1.8)
BASOPHILS # BLD: 0.02 K/UL (ref 0–0.12)
BILIRUB SERPL-MCNC: 0.3 MG/DL (ref 0.1–1.5)
BUN SERPL-MCNC: 8 MG/DL (ref 8–22)
CALCIUM SERPL-MCNC: 8.7 MG/DL (ref 8.5–10.5)
CHLORIDE SERPL-SCNC: 106 MMOL/L (ref 96–112)
CO2 SERPL-SCNC: 25 MMOL/L (ref 20–33)
CREAT SERPL-MCNC: 0.57 MG/DL (ref 0.5–1.4)
EOSINOPHIL # BLD AUTO: 0.1 K/UL (ref 0–0.51)
EOSINOPHIL NFR BLD: 3.2 % (ref 0–6.9)
ERYTHROCYTE [DISTWIDTH] IN BLOOD BY AUTOMATED COUNT: 65 FL (ref 35.9–50)
GLOBULIN SER CALC-MCNC: 2.2 G/DL (ref 1.9–3.5)
GLUCOSE SERPL-MCNC: 83 MG/DL (ref 65–99)
HCT VFR BLD AUTO: 30 % (ref 42–52)
HGB BLD-MCNC: 9.8 G/DL (ref 14–18)
IMM GRANULOCYTES # BLD AUTO: 0.03 K/UL (ref 0–0.11)
IMM GRANULOCYTES NFR BLD AUTO: 0.9 % (ref 0–0.9)
LYMPHOCYTES # BLD AUTO: 0.88 K/UL (ref 1–4.8)
LYMPHOCYTES NFR BLD: 27.8 % (ref 22–41)
MCH RBC QN AUTO: 33.4 PG (ref 27–33)
MCHC RBC AUTO-ENTMCNC: 32.7 G/DL (ref 33.7–35.3)
MCV RBC AUTO: 102.4 FL (ref 81.4–97.8)
MONOCYTES # BLD AUTO: 0.41 K/UL (ref 0–0.85)
MONOCYTES NFR BLD AUTO: 13 % (ref 0–13.4)
NEUTROPHILS # BLD AUTO: 1.72 K/UL (ref 1.82–7.42)
NEUTROPHILS NFR BLD: 54.5 % (ref 44–72)
NRBC # BLD AUTO: 0 K/UL
NRBC BLD-RTO: 0 /100 WBC
PLATELET # BLD AUTO: 121 K/UL (ref 164–446)
PMV BLD AUTO: 10.5 FL (ref 9–12.9)
POTASSIUM SERPL-SCNC: 3.7 MMOL/L (ref 3.6–5.5)
PROT SERPL-MCNC: 5.7 G/DL (ref 6–8.2)
RBC # BLD AUTO: 2.93 M/UL (ref 4.7–6.1)
SODIUM SERPL-SCNC: 139 MMOL/L (ref 135–145)
WBC # BLD AUTO: 3.2 K/UL (ref 4.8–10.8)

## 2020-10-28 PROCEDURE — 96413 CHEMO IV INFUSION 1 HR: CPT

## 2020-10-28 PROCEDURE — A4212 NON CORING NEEDLE OR STYLET: HCPCS

## 2020-10-28 PROCEDURE — G0498 CHEMO EXTEND IV INFUS W/PUMP: HCPCS

## 2020-10-28 PROCEDURE — 80053 COMPREHEN METABOLIC PANEL: CPT

## 2020-10-28 PROCEDURE — 96367 TX/PROPH/DG ADDL SEQ IV INF: CPT

## 2020-10-28 PROCEDURE — 700105 HCHG RX REV CODE 258: Performed by: INTERNAL MEDICINE

## 2020-10-28 PROCEDURE — 96415 CHEMO IV INFUSION ADDL HR: CPT

## 2020-10-28 PROCEDURE — 96375 TX/PRO/DX INJ NEW DRUG ADDON: CPT

## 2020-10-28 PROCEDURE — 96417 CHEMO IV INFUS EACH ADDL SEQ: CPT

## 2020-10-28 PROCEDURE — 96411 CHEMO IV PUSH ADDL DRUG: CPT

## 2020-10-28 PROCEDURE — 700111 HCHG RX REV CODE 636 W/ 250 OVERRIDE (IP): Performed by: INTERNAL MEDICINE

## 2020-10-28 PROCEDURE — 85025 COMPLETE CBC W/AUTO DIFF WBC: CPT

## 2020-10-28 RX ORDER — HEPARIN SODIUM (PORCINE) LOCK FLUSH IV SOLN 100 UNIT/ML 100 UNIT/ML
500 SOLUTION INTRAVENOUS PRN
Status: CANCELLED | OUTPATIENT
Start: 2020-10-28

## 2020-10-28 RX ORDER — EPINEPHRINE 1 MG/ML(1)
0.5 AMPUL (ML) INJECTION PRN
Status: CANCELLED | OUTPATIENT
Start: 2020-10-28

## 2020-10-28 RX ORDER — 0.9 % SODIUM CHLORIDE 0.9 %
10 VIAL (ML) INJECTION PRN
Status: CANCELLED | OUTPATIENT
Start: 2020-10-28

## 2020-10-28 RX ORDER — LOPERAMIDE HYDROCHLORIDE 2 MG/1
2 CAPSULE ORAL
Status: CANCELLED | OUTPATIENT
Start: 2020-10-28

## 2020-10-28 RX ORDER — LOPERAMIDE HYDROCHLORIDE 2 MG/1
4 CAPSULE ORAL PRN
Status: CANCELLED | OUTPATIENT
Start: 2020-10-28

## 2020-10-28 RX ORDER — 0.9 % SODIUM CHLORIDE 0.9 %
20 VIAL (ML) INJECTION PRN
Status: CANCELLED | OUTPATIENT
Start: 2020-10-30

## 2020-10-28 RX ORDER — 0.9 % SODIUM CHLORIDE 0.9 %
VIAL (ML) INJECTION PRN
Status: CANCELLED | OUTPATIENT
Start: 2020-10-28

## 2020-10-28 RX ORDER — DIPHENHYDRAMINE HYDROCHLORIDE 50 MG/ML
50 INJECTION INTRAMUSCULAR; INTRAVENOUS PRN
Status: CANCELLED | OUTPATIENT
Start: 2020-10-28

## 2020-10-28 RX ORDER — ONDANSETRON 2 MG/ML
4 INJECTION INTRAMUSCULAR; INTRAVENOUS PRN
Status: CANCELLED | OUTPATIENT
Start: 2020-10-28

## 2020-10-28 RX ORDER — METHYLPREDNISOLONE SODIUM SUCCINATE 125 MG/2ML
125 INJECTION, POWDER, LYOPHILIZED, FOR SOLUTION INTRAMUSCULAR; INTRAVENOUS PRN
Status: CANCELLED | OUTPATIENT
Start: 2020-10-28

## 2020-10-28 RX ORDER — HEPARIN SODIUM (PORCINE) LOCK FLUSH IV SOLN 100 UNIT/ML 100 UNIT/ML
500 SOLUTION INTRAVENOUS PRN
Status: CANCELLED | OUTPATIENT
Start: 2020-10-30

## 2020-10-28 RX ORDER — DEXTROSE MONOHYDRATE 50 MG/ML
INJECTION, SOLUTION INTRAVENOUS CONTINUOUS
Status: CANCELLED | OUTPATIENT
Start: 2020-10-28

## 2020-10-28 RX ORDER — ONDANSETRON 8 MG/1
8 TABLET, ORALLY DISINTEGRATING ORAL PRN
Status: CANCELLED | OUTPATIENT
Start: 2020-10-28

## 2020-10-28 RX ORDER — PROCHLORPERAZINE MALEATE 10 MG
10 TABLET ORAL EVERY 6 HOURS PRN
Status: CANCELLED | OUTPATIENT
Start: 2020-10-28

## 2020-10-28 RX ORDER — 0.9 % SODIUM CHLORIDE 0.9 %
3 VIAL (ML) INJECTION PRN
Status: CANCELLED | OUTPATIENT
Start: 2020-10-28

## 2020-10-28 RX ADMIN — FLUOROURACIL 4390 MG: 50 INJECTION, SOLUTION INTRAVENOUS at 16:18

## 2020-10-28 RX ADMIN — SODIUM CHLORIDE 150 MG: 9 INJECTION, SOLUTION INTRAVENOUS at 10:31

## 2020-10-28 RX ADMIN — IRINOTECAN HYDROCHLORIDE 219.6 MG: 20 INJECTION, SOLUTION INTRAVENOUS at 14:30

## 2020-10-28 RX ADMIN — OXALIPLATIN 124.44 MG: 5 INJECTION, SOLUTION, CONCENTRATE INTRAVENOUS at 11:57

## 2020-10-28 RX ADMIN — ONDANSETRON 16 MG: 2 INJECTION INTRAMUSCULAR; INTRAVENOUS at 10:31

## 2020-10-28 RX ADMIN — ATROPINE SULFATE 0.5 MG: 1 INJECTION, SOLUTION INTRAMUSCULAR; INTRAVENOUS; SUBCUTANEOUS at 14:26

## 2020-10-28 RX ADMIN — DEXAMETHASONE SODIUM PHOSPHATE 12 MG: 4 INJECTION, SOLUTION INTRA-ARTICULAR; INTRALESIONAL; INTRAMUSCULAR; INTRAVENOUS; SOFT TISSUE at 10:31

## 2020-10-28 ASSESSMENT — FIBROSIS 4 INDEX: FIB4 SCORE: 2.97

## 2020-10-28 NOTE — PROGRESS NOTES
Chemotherapy Verification - PRIMARY RN      Height = 178.5cm  Weight = 67.4kg  BSA = 1.83m2       Medication: oxaliplatin  Dose: 68mg/m2  Calculated Dose: 124.4mg                             (In mg/m2, AUC, mg/kg)     Medication: Irinotecan  Dose: 120mg/m2  Calculated Dose: 219.6mg                             (In mg/m2, AUC, mg/kg)    Medication: Fluorouacil  Dose: 2400mg/m2  Calculated Dose: 4392mg                             (In mg/m2, AUC, mg/kg)      I confirm this process was performed independently with the BSA and all final chemotherapy dosing calculations congruent.  Any discrepancies of 10% or greater have been addressed with the chemotherapy pharmacist. The resolution of the discrepancy has been documented in the EPIC progress notes.

## 2020-10-28 NOTE — PROGRESS NOTES
Chemotherapy Verification - SECONDARY RN       Height = 178.5 cm  Weight = 67.4 kg  BSA = 1.828 m2       Medication: Oxaliplatin  Dose: 68 mg/m2 dr  Calculated Dose: 124.3 mg                             (In mg/m2, AUC, mg/kg)     Medication: Irinotecan  Dose: 120 mg/m2 dr  Calculated Dose: 219.36 mg                              (In mg/m2, AUC, mg/kg)    Medication: Home infusion 5FU  Dose: 2400 mg/m2  Calculated Dose: 4387.2 mg over 46 hours                             (In mg/m2, AUC, mg/kg)      I confirm that this process was performed independently.

## 2020-10-29 NOTE — PROGRESS NOTES
Eh came to [facility] today for his C6D1 FOLFIRINOX. Labs drawn from L chest port, all resulting within treatment parameters. Pre medicated with dex, zofran, and emend, irinotecan premedicated separately with IV atropine. FOLORINOX infused as ordered without issue. 5FU pump connected, confirmed running at patient discharge. Eh discharges stable, ambulatory, and aware of upcoming appointments.

## 2020-10-30 ENCOUNTER — OUTPATIENT INFUSION SERVICES (OUTPATIENT)
Dept: ONCOLOGY | Facility: MEDICAL CENTER | Age: 65
End: 2020-10-30
Attending: INTERNAL MEDICINE
Payer: MEDICARE

## 2020-10-30 VITALS
SYSTOLIC BLOOD PRESSURE: 136 MMHG | OXYGEN SATURATION: 98 % | TEMPERATURE: 97.9 F | DIASTOLIC BLOOD PRESSURE: 79 MMHG | HEART RATE: 61 BPM | RESPIRATION RATE: 17 BRPM

## 2020-10-30 DIAGNOSIS — C25.0 MALIGNANT NEOPLASM OF HEAD OF PANCREAS (HCC): ICD-10-CM

## 2020-10-30 PROCEDURE — 700111 HCHG RX REV CODE 636 W/ 250 OVERRIDE (IP)

## 2020-10-30 PROCEDURE — 96523 IRRIG DRUG DELIVERY DEVICE: CPT

## 2020-10-30 RX ORDER — HEPARIN SODIUM (PORCINE) LOCK FLUSH IV SOLN 100 UNIT/ML 100 UNIT/ML
500 SOLUTION INTRAVENOUS PRN
Status: DISCONTINUED | OUTPATIENT
Start: 2020-10-30 | End: 2020-10-30 | Stop reason: HOSPADM

## 2020-10-30 RX ORDER — 0.9 % SODIUM CHLORIDE 0.9 %
20 VIAL (ML) INJECTION PRN
Status: DISCONTINUED | OUTPATIENT
Start: 2020-10-30 | End: 2020-10-30 | Stop reason: HOSPADM

## 2020-10-30 RX ORDER — HEPARIN SODIUM (PORCINE) LOCK FLUSH IV SOLN 100 UNIT/ML 100 UNIT/ML
SOLUTION INTRAVENOUS
Status: COMPLETED
Start: 2020-10-30 | End: 2020-10-30

## 2020-10-30 RX ADMIN — HEPARIN SODIUM (PORCINE) LOCK FLUSH IV SOLN 100 UNIT/ML 500 UNITS: 100 SOLUTION at 16:07

## 2020-10-30 RX ADMIN — HEPARIN 500 UNITS: 100 SYRINGE at 16:07

## 2020-10-31 ENCOUNTER — OUTPATIENT INFUSION SERVICES (OUTPATIENT)
Dept: ONCOLOGY | Facility: MEDICAL CENTER | Age: 65
End: 2020-10-31
Attending: INTERNAL MEDICINE
Payer: MEDICARE

## 2020-10-31 VITALS
OXYGEN SATURATION: 98 % | HEART RATE: 62 BPM | DIASTOLIC BLOOD PRESSURE: 75 MMHG | RESPIRATION RATE: 18 BRPM | WEIGHT: 147.05 LBS | BODY MASS INDEX: 21.05 KG/M2 | HEIGHT: 70 IN | TEMPERATURE: 97.8 F | SYSTOLIC BLOOD PRESSURE: 149 MMHG

## 2020-10-31 DIAGNOSIS — C25.0 MALIGNANT NEOPLASM OF HEAD OF PANCREAS (HCC): ICD-10-CM

## 2020-10-31 PROCEDURE — 96372 THER/PROPH/DIAG INJ SC/IM: CPT

## 2020-10-31 PROCEDURE — 700111 HCHG RX REV CODE 636 W/ 250 OVERRIDE (IP): Mod: TB | Performed by: INTERNAL MEDICINE

## 2020-10-31 RX ADMIN — PEGFILGRASTIM-CBQV 6 MG: 6 INJECTION, SOLUTION SUBCUTANEOUS at 16:17

## 2020-10-31 ASSESSMENT — PAIN DESCRIPTION - PAIN TYPE: TYPE: ACUTE PAIN

## 2020-10-31 ASSESSMENT — FIBROSIS 4 INDEX: FIB4 SCORE: 3.16

## 2020-10-31 NOTE — PROGRESS NOTES
Pt ambulatory to [facility] for C6 D4 post-chemo Udenyca injection.  Pt w/ no s/s of infection, pt has no complaints at this time.  Udenyca injected into right back arm, band-aid placed.  Pt left on foot in care of self in NAD.  Pt will call on Monday of he need future appts with us.

## 2020-11-09 ENCOUNTER — HOSPITAL ENCOUNTER (OUTPATIENT)
Dept: RADIOLOGY | Facility: MEDICAL CENTER | Age: 65
End: 2020-11-09
Payer: MEDICARE

## 2020-11-09 NOTE — PROGRESS NOTES
Subjective:      Primary care physician:Pcp Pt States None  Referring Provider: Gardenia Moser MD  Medical Oncologist: LUIS  Other: Melvin Marquis MD    Chief Complaint: No chief complaint on file.    Diagnosis:   1. 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 Abdominal pain, unspecified abdominal location     3. Obstructive jaundice due to malignant neoplasm (HCC)         History of presenting illness:  Janes Cody  is a pleasant 65 y.o. year old male who presented with ***      Past Medical History:   Diagnosis Date   • Cancer (HCC) 2020    pancreatic   • Dental disorder     full upper and lower   • Heart burn    • Hypertension    • Jaundice    • Seizure disorder (HCC)     had a couple seizures once but not on meds and never dx with anything     Past Surgical History:   Procedure Laterality Date   • PB ERCP,DIAGNOSTIC  10/13/2020    Procedure: ERCP, DIAGNOSTIC;  Surgeon: Melvin Marquis M.D.;  Location: [facility];  Service: Gastroenterology   • PB ERCP,DIAGNOSTIC  7/24/2020    Procedure: ERCP (ENDOSCOPIC RETROGRADE CHOLANGIOPANCREATOGRAPHY);  Surgeon: Rufino Wagner M.D.;  Location: [facility];  Service: Gastroenterology   • PB ERCP,W/REMOVAL STONE,NEY/PANCR DUCTS  7/24/2020    Procedure: ERCP, WITH CALCULUS REMOVAL;  Surgeon: Rufino Wagner M.D.;  Location: [facility];  Service: Gastroenterology   • [place] ERCP STENT PLACEMENT BILIARY/PANCREATIC DUCT  7/24/2020    Procedure: ERCP, WITH TUBE OR STENT INSERTION;  Surgeon: Rufino Wagner M.D.;  Location: [facility];  Service: Gastroenterology   • CATH PLACEMENT Left 7/16/2020    Procedure: INSERTION, CATHETER-PORT A CATH;  Surgeon: Rbo Wells M.D.;  Location: [facility];  Service: General   • PB ERCP,DIAGNOSTIC  6/22/2020    Procedure: ERCP, DIAGNOSTIC;  Surgeon: Melvin Marquis M.D.;  Location: [facility];  Service: Gastroenterology   • PB  ESOPHAGOSCOPY,ENDOSCOPIC ULTRASND  6/22/2020    Procedure: ESOPHAGOSCOPY, WITH ENDOSCOPIC US;  Surgeon: Melvin Marquis M.D.;  [facility]: [facility];  Service: Gastroenterology   • ELBOW ARTHROSCOPY       No Known Allergies  Outpatient Encounter Medications as of 11/10/2020   Medication Sig Dispense Refill   • ondansetron (ZOFRAN) 8 MG Tab      • prochlorperazine (COMPAZINE) 10 MG Tab      • oxyCODONE HCl 5 MG Tablet Abuse-Deterrent Take  by mouth as needed.     • tamsulosin (FLOMAX) 0.4 MG capsule Take 0.4 mg by mouth ONE-HALF HOUR AFTER BREAKFAST.     • lisinopril (PRINIVIL) 10 MG Tab Take 10 mg by mouth every day. Indications: High Blood Pressure Disorder     • pantoprazole (PROTONIX) 40 MG Tablet Delayed Response Take 40 mg by mouth every day.       No facility-administered encounter medications on file as of 11/10/2020.      Social History     Socioeconomic History   • Marital status: Single     Spouse name: Not on file   • Number of children: Not on file   • Years of education: Not on file   • Highest education level: Not on file   Occupational History   • Not on file   Social Needs   • Financial resource strain: Somewhat hard   • Food insecurity     Worry: Never true     Inability: Never true   • Transportation needs     Medical: No     Non-medical: No   Tobacco Use   • Smoking status: Current Every Day Smoker     Packs/day: 0.25     Years: 50.00     Pack years: 12.50     Types: Cigarettes   • Smokeless tobacco: Never Used   Substance and Sexual Activity   • Alcohol use: Yes     Comment: occassional   • Drug use: Yes     Comment: marijuana   • Sexual activity: Not on file   Lifestyle   • Physical activity     Days per week: Not on file     Minutes per session: Not on file   • Stress: Not on file   Relationships   • Social connections     Talks on phone: Not on file     Gets together: Not on file     Attends Denominational service: Not on file     Active member of club or organization: Not on file        Attends meetings of clubs or organizations: Not on file     Relationship status: Not on file   • Intimate partner violence     Fear of current or ex partner: Not on file     Emotionally abused: Not on file     Physically abused: Not on file     Forced sexual activity: Not on file   Other Topics Concern   • Not on file   Social History Narrative   • Not on file      Social History     Tobacco Use   Smoking Status Current Every Day Smoker   • Packs/day: 0.25   • Years: 50.00   • Pack years: 12.50   • Types: Cigarettes   Smokeless Tobacco Never Used     Social History     Substance and Sexual Activity   Alcohol Use Yes    Comment: occassional     Social History     Substance and Sexual Activity   Drug Use Yes    Comment: marijuana        No family history on file.  No pertinent family history on file    ROS     Objective:   There were no vitals taken for this visit.    Physical Exam    Labs:  Results for CHRISSIE MARY (MRN [id number]) as of 11/9/2020 10:32   Ref. Range 10/28/2020 09:22   WBC Latest Ref Range: 4.8 - 10.8 K/uL 3.2 (L)   RBC Latest Ref Range: 4.70 - 6.10 M/uL 2.93 (L)   Hemoglobin Latest Ref Range: 14.0 - 18.0 g/dL 9.8 (L)   Hematocrit Latest Ref Range: 42.0 - 52.0 % 30.0 (L)   MCV Latest Ref Range: 81.4 - 97.8 fL 102.4 (H)   MCH Latest Ref Range: 27.0 - 33.0 pg 33.4 (H)   MCHC Latest Ref Range: 33.7 - 35.3 g/dL 32.7 (L)   RDW Latest Ref Range: 35.9 - 50.0 fL 65.0 (H)   Platelet Count Latest Ref Range: 164 - 446 K/uL 121 (L)   MPV Latest Ref Range: 9.0 - 12.9 fL 10.5   Neutrophils-Polys Latest Ref Range: 44.00 - 72.00 % 54.50   Neutrophils (Absolute) Latest Ref Range: 1.82 - 7.42 K/uL 1.72 (L)   Lymphocytes Latest Ref Range: 22.00 - 41.00 % 27.80   Lymphs (Absolute) Latest Ref Range: 1.00 - 4.80 K/uL 0.88 (L)   Monocytes Latest Ref Range: 0.00 - 13.40 % 13.00   Monos (Absolute) Latest Ref Range: 0.00 - 0.85 K/uL 0.41   Eosinophils Latest Ref Range: 0.00 - 6.90 % 3.20   Eos (Absolute) Latest  Ref Range: 0.00 - 0.51 K/uL 0.10   Basophils Latest Ref Range: 0.00 - 1.80 % 0.60   Baso (Absolute) Latest Ref Range: 0.00 - 0.12 K/uL 0.02   Immature Granulocytes Latest Ref Range: 0.00 - 0.90 % 0.90   Immature Granulocytes (abs) Latest Ref Range: 0.00 - 0.11 K/uL 0.03   Nucleated RBC Latest Units: /100 WBC 0.00   NRBC (Absolute) Latest Units: K/uL 0.00   Sodium Latest Ref Range: 135 - 145 mmol/L 139   Potassium Latest Ref Range: 3.6 - 5.5 mmol/L 3.7   Chloride Latest Ref Range: 96 - 112 mmol/L 106   Co2 Latest Ref Range: 20 - 33 mmol/L 25   Anion Gap Latest Ref Range: 7.0 - 16.0  8.0   Glucose Latest Ref Range: 65 - 99 mg/dL 83   Bun Latest Ref Range: 8 - 22 mg/dL 8   Creatinine Latest Ref Range: 0.50 - 1.40 mg/dL 0.57   GFR If  Latest Ref Range: >60 mL/min/1.73 m 2 >60   GFR If Non  Latest Ref Range: >60 mL/min/1.73 m 2 >60   Calcium Latest Ref Range: 8.5 - 10.5 mg/dL 8.7   AST(SGOT) Latest Ref Range: 12 - 45 U/L 38   ALT(SGPT) Latest Ref Range: 2 - 50 U/L 43   Alkaline Phosphatase Latest Ref Range: 30 - 99 U/L 220 (H)   Total Bilirubin Latest Ref Range: 0.1 - 1.5 mg/dL 0.3   Albumin Latest Ref Range: 3.2 - 4.9 g/dL 3.5   Total Protein Latest Ref Range: 6.0 - 8.2 g/dL 5.7 (L)   Globulin Latest Ref Range: 1.9 - 3.5 g/dL 2.2   A-G Ratio Latest Units: g/dL 1.6       Imaging: 10/26/20 CT [facility]  Per my read,         Pathology:  6/22/20     FINAL DIAGNOSIS:     A. Slides- head of pancreas mass:          Positive for carcinoma.   B. Core- head of pancreas mass:          Positive for adenocarcinoma morphologically consistent with           pancreatic ductal adenocarcinoma.   C. Brushings/ brush head- common bile duct stricture:          Atypical cells concerning for high-grade dysplasia or           adenocarcinoma.      Procedures:  6/22/20      Endoscopic ultrasound with fine needle aspiration, Endoscopic retrograde cholangiography with sphincterotomy, bile duct brushing, and stent  placement       Diagnosis:     1. 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 Abdominal pain, unspecified abdominal location     3. Obstructive jaundice due to malignant neoplasm (HCC)             Medical Decision Making:  Today's Assessment / Status / Plan:     ***

## 2020-11-10 ENCOUNTER — APPOINTMENT (OUTPATIENT)
Dept: SURGICAL ONCOLOGY | Facility: MEDICAL CENTER | Age: 65
End: 2020-11-10
Payer: MEDICARE

## 2020-11-10 DIAGNOSIS — C25.0 MALIGNANT NEOPLASM OF HEAD OF PANCREAS (HCC): ICD-10-CM

## 2020-11-10 DIAGNOSIS — K83.1 OBSTRUCTIVE JAUNDICE DUE TO MALIGNANT NEOPLASM (HCC): ICD-10-CM

## 2020-11-10 DIAGNOSIS — R10.9 ABDOMINAL PAIN, UNSPECIFIED ABDOMINAL LOCATION: ICD-10-CM

## 2020-11-10 DIAGNOSIS — C80.1 OBSTRUCTIVE JAUNDICE DUE TO MALIGNANT NEOPLASM (HCC): ICD-10-CM

## 2020-11-16 RX ORDER — 0.9 % SODIUM CHLORIDE 0.9 %
VIAL (ML) INJECTION PRN
Status: CANCELLED | OUTPATIENT
Start: 2020-11-16

## 2020-11-16 RX ORDER — 0.9 % SODIUM CHLORIDE 0.9 %
10 VIAL (ML) INJECTION PRN
Status: CANCELLED | OUTPATIENT
Start: 2020-11-16

## 2020-11-16 RX ORDER — HEPARIN SODIUM (PORCINE) LOCK FLUSH IV SOLN 100 UNIT/ML 100 UNIT/ML
500 SOLUTION INTRAVENOUS PRN
Status: CANCELLED | OUTPATIENT
Start: 2020-11-16

## 2020-11-16 RX ORDER — 0.9 % SODIUM CHLORIDE 0.9 %
3 VIAL (ML) INJECTION PRN
Status: CANCELLED | OUTPATIENT
Start: 2020-11-16

## 2020-11-17 ENCOUNTER — OFFICE VISIT (OUTPATIENT)
Dept: SURGICAL ONCOLOGY | Facility: MEDICAL CENTER | Age: 65
End: 2020-11-17
Payer: MEDICARE

## 2020-11-17 ENCOUNTER — OUTPATIENT INFUSION SERVICES (OUTPATIENT)
Dept: ONCOLOGY | Facility: MEDICAL CENTER | Age: 65
End: 2020-11-17
Attending: INTERNAL MEDICINE
Payer: MEDICARE

## 2020-11-17 VITALS
BODY MASS INDEX: 20.65 KG/M2 | OXYGEN SATURATION: 99 % | WEIGHT: 147.49 LBS | HEART RATE: 69 BPM | TEMPERATURE: 97.6 F | DIASTOLIC BLOOD PRESSURE: 62 MMHG | SYSTOLIC BLOOD PRESSURE: 133 MMHG | HEIGHT: 71 IN | RESPIRATION RATE: 18 BRPM

## 2020-11-17 VITALS
OXYGEN SATURATION: 99 % | SYSTOLIC BLOOD PRESSURE: 128 MMHG | DIASTOLIC BLOOD PRESSURE: 72 MMHG | WEIGHT: 146 LBS | BODY MASS INDEX: 18.74 KG/M2 | HEART RATE: 60 BPM | TEMPERATURE: 97.2 F | HEIGHT: 74 IN

## 2020-11-17 DIAGNOSIS — C25.0 MALIGNANT NEOPLASM OF HEAD OF PANCREAS (HCC): ICD-10-CM

## 2020-11-17 DIAGNOSIS — R93.5 ABNORMAL CT OF THE ABDOMEN: ICD-10-CM

## 2020-11-17 DIAGNOSIS — R10.9 ABDOMINAL PAIN, UNSPECIFIED ABDOMINAL LOCATION: ICD-10-CM

## 2020-11-17 LAB
ALBUMIN SERPL BCP-MCNC: 3.6 G/DL (ref 3.2–4.9)
ALBUMIN/GLOB SERPL: 1.4 G/DL
ALP SERPL-CCNC: 180 U/L (ref 30–99)
ALT SERPL-CCNC: 31 U/L (ref 2–50)
ANION GAP SERPL CALC-SCNC: 9 MMOL/L (ref 7–16)
ANISOCYTOSIS BLD QL SMEAR: ABNORMAL
AST SERPL-CCNC: 31 U/L (ref 12–45)
BASOPHILS # BLD AUTO: 0.9 % (ref 0–1.8)
BASOPHILS # BLD: 0.03 K/UL (ref 0–0.12)
BILIRUB SERPL-MCNC: 0.3 MG/DL (ref 0.1–1.5)
BUN SERPL-MCNC: 11 MG/DL (ref 8–22)
CALCIUM SERPL-MCNC: 8.8 MG/DL (ref 8.5–10.5)
CANCER AG19-9 SERPL-ACNC: 55.5 U/ML (ref 0–35)
CHLORIDE SERPL-SCNC: 106 MMOL/L (ref 96–112)
CO2 SERPL-SCNC: 25 MMOL/L (ref 20–33)
CREAT SERPL-MCNC: 0.59 MG/DL (ref 0.5–1.4)
EOSINOPHIL # BLD AUTO: 0.12 K/UL (ref 0–0.51)
EOSINOPHIL NFR BLD: 3.5 % (ref 0–6.9)
ERYTHROCYTE [DISTWIDTH] IN BLOOD BY AUTOMATED COUNT: 69.8 FL (ref 35.9–50)
GLOBULIN SER CALC-MCNC: 2.5 G/DL (ref 1.9–3.5)
GLUCOSE SERPL-MCNC: 156 MG/DL (ref 65–99)
HCT VFR BLD AUTO: 31 % (ref 42–52)
HGB BLD-MCNC: 9.9 G/DL (ref 14–18)
LYMPHOCYTES # BLD AUTO: 1.06 K/UL (ref 1–4.8)
LYMPHOCYTES NFR BLD: 31.3 % (ref 22–41)
MACROCYTES BLD QL SMEAR: ABNORMAL
MANUAL DIFF BLD: NORMAL
MCH RBC QN AUTO: 34.5 PG (ref 27–33)
MCHC RBC AUTO-ENTMCNC: 31.9 G/DL (ref 33.7–35.3)
MCV RBC AUTO: 108 FL (ref 81.4–97.8)
MONOCYTES # BLD AUTO: 0.12 K/UL (ref 0–0.85)
MONOCYTES NFR BLD AUTO: 3.5 % (ref 0–13.4)
MORPHOLOGY BLD-IMP: NORMAL
NEUTROPHILS # BLD AUTO: 2.07 K/UL (ref 1.82–7.42)
NEUTROPHILS NFR BLD: 60.9 % (ref 44–72)
NRBC # BLD AUTO: 0 K/UL
NRBC BLD-RTO: 0 /100 WBC
PLATELET # BLD AUTO: 144 K/UL (ref 164–446)
PLATELET BLD QL SMEAR: NORMAL
PMV BLD AUTO: 10.5 FL (ref 9–12.9)
POTASSIUM SERPL-SCNC: 4.1 MMOL/L (ref 3.6–5.5)
PROT SERPL-MCNC: 6.1 G/DL (ref 6–8.2)
RBC # BLD AUTO: 2.87 M/UL (ref 4.7–6.1)
RBC BLD AUTO: PRESENT
SODIUM SERPL-SCNC: 140 MMOL/L (ref 135–145)
WBC # BLD AUTO: 3.4 K/UL (ref 4.8–10.8)

## 2020-11-17 PROCEDURE — 85027 COMPLETE CBC AUTOMATED: CPT

## 2020-11-17 PROCEDURE — 86301 IMMUNOASSAY TUMOR CA 19-9: CPT

## 2020-11-17 PROCEDURE — 80053 COMPREHEN METABOLIC PANEL: CPT

## 2020-11-17 PROCEDURE — 85007 BL SMEAR W/DIFF WBC COUNT: CPT

## 2020-11-17 PROCEDURE — A4212 NON CORING NEEDLE OR STYLET: HCPCS

## 2020-11-17 PROCEDURE — 36591 DRAW BLOOD OFF VENOUS DEVICE: CPT

## 2020-11-17 PROCEDURE — 99215 OFFICE O/P EST HI 40 MIN: CPT | Performed by: SURGERY

## 2020-11-17 PROCEDURE — 700111 HCHG RX REV CODE 636 W/ 250 OVERRIDE (IP): Performed by: INTERNAL MEDICINE

## 2020-11-17 RX ORDER — HEPARIN SODIUM (PORCINE) LOCK FLUSH IV SOLN 100 UNIT/ML 100 UNIT/ML
500 SOLUTION INTRAVENOUS PRN
Status: DISCONTINUED | OUTPATIENT
Start: 2020-11-17 | End: 2020-11-17 | Stop reason: HOSPADM

## 2020-11-17 RX ADMIN — HEPARIN 500 UNITS: 100 SYRINGE at 11:28

## 2020-11-17 ASSESSMENT — ENCOUNTER SYMPTOMS: ABDOMINAL PAIN: 1

## 2020-11-17 ASSESSMENT — FIBROSIS 4 INDEX
FIB4 SCORE: 3.16
FIB4 SCORE: 3.16

## 2020-11-17 NOTE — PROGRESS NOTES
Pt arrived for pre chemo labs. Denies s/sx of infections or fevers. Port accessed and labs drawn. Pt mentions that he would like to leave port accessed this time for chemo tomorrow. Biopatch and tagaderm applied. Line secured. Instructed pt to be very careful with it since I did not use a low profile needle. Port hep locked. Pt left infusion in stable condition.

## 2020-11-17 NOTE — PROGRESS NOTES
Subjective:   11/17/2020  7:52 AM  Primary care physician:Pcp Pt States None  Referring Provider:Gardenia Moser MD  Medical Oncologist:  Dr. Rasheed JEFFRIES [facility]  Other: Melvin Marquis MD      Chief Complaint:   Chief Complaint   Patient presents with   • Follow-Up     [facility] CT PANCREATIC CA      Diagnosis:   1. 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 Abdominal pain, unspecified abdominal location     3. Abnormal CT of the abdomen         History of presenting illness:  Janes Cody  is a pleasant 65 y.o. year old male who presented with follow-up status post completion of 6 cycles of chemotherapy.  The patient states he had to have his chemotherapy held at times due to fatigue as well as thrombocytopenia.  The patient states that he is living with his daughter through the most.  He denies any fever or chills, nausea or vomiting.  He has had his stent changed out several times so far does not appear jaundiced at this time.  Patient denies being jaundiced or dark-colored urine.  He states his appetite is okay except he does decrease following chemotherapy.  He does get around by himself but he does appear to be somewhat cachectic.  He does not complain of any discomfort.  I have personally reviewed the imaging from [facility] dated October 26, 2020.  It does appear that on the lateral aspect of the SMV portal vein junction there is haziness and I do not see any direct plane free enough to resect.  He is only midway through his chemotherapy.  There is no sign of metastatic disease or adenopathy.  There is a dilated pancreatic duct and does terminate in the head of the pancreas where the tumor is.  He does have a stent in      Past Medical History:   Diagnosis Date   • Cancer (HCC) 2020    pancreatic   • Dental disorder     full upper and lower   • Heart burn    • Hypertension    • Jaundice    • Seizure disorder (HCC)     had a couple seizures once but not on meds and never dx with anything     Past Surgical History:    Procedure Laterality Date   • PB ERCP,DIAGNOSTIC  10/13/2020    Procedure: ERCP, DIAGNOSTIC;  Surgeon: Melvin Marquis M.D.;  Location: [facility];  Service: Gastroenterology   • PB ERCP,DIAGNOSTIC  7/24/2020    Procedure: ERCP (ENDOSCOPIC RETROGRADE CHOLANGIOPANCREATOGRAPHY);  Surgeon: Rufino Wagner M.D.;  Location: [facility];  Service: Gastroenterology   • PB ERCP,W/REMOVAL STONE,NEY/PANCR DUCTS  7/24/2020    Procedure: ERCP, WITH CALCULUS REMOVAL;  Surgeon: Rufino Wagner M.D.;  Location: [facility];  Service: Gastroenterology   • [place] ERCP STENT PLACEMENT BILIARY/PANCREATIC DUCT  7/24/2020    Procedure: ERCP, WITH TUBE OR STENT INSERTION;  Surgeon: Rufino Wagner M.D.;  Location: [facility];  Service: Gastroenterology   • CATH PLACEMENT Left 7/16/2020    Procedure: INSERTION, CATHETER-PORT A CATH;  Surgeon: Rob Wells M.D.;  Location: [facility];  Service: General   • PB ERCP,DIAGNOSTIC  6/22/2020    Procedure: ERCP, DIAGNOSTIC;  Surgeon: Melvin Marquis M.D.;  Location: [facility];  Service: Gastroenterology   • PB ESOPHAGOSCOPY,ENDOSCOPIC ULTRASND  6/22/2020    Procedure: ESOPHAGOSCOPY, WITH ENDOSCOPIC US;  Surgeon: Melvin Marquis M.D.;  Location: [facility];  Service: Gastroenterology   • ELBOW ARTHROSCOPY       No Known Allergies  Outpatient Encounter Medications as of 11/17/2020   Medication Sig Dispense Refill   • ondansetron (ZOFRAN) 8 MG Tab      • prochlorperazine (COMPAZINE) 10 MG Tab      • oxyCODONE HCl 5 MG Tablet Abuse-Deterrent Take  by mouth as needed.     • tamsulosin (FLOMAX) 0.4 MG capsule Take 0.4 mg by mouth ONE-HALF HOUR AFTER BREAKFAST.     • lisinopril (PRINIVIL) 10 MG Tab Take 10 mg by mouth every day. Indications: High Blood Pressure Disorder     • pantoprazole (PROTONIX) 40 MG Tablet Delayed Response Take 40 mg by mouth every day.       No facility-administered  encounter medications on file as of 11/17/2020.      Social History     Socioeconomic History   • Marital status: Single     Spouse name: Not on file   • Number of children: Not on file   • Years of education: Not on file   • Highest education level: Not on file   Occupational History   • Not on file   Social Needs   • Financial resource strain: Somewhat hard   • Food insecurity     Worry: Never true     Inability: Never true   • Transportation needs     Medical: No     Non-medical: No   Tobacco Use   • Smoking status: Current Every Day Smoker     Packs/day: 0.25     Years: 50.00     Pack years: 12.50     Types: Cigarettes   • Smokeless tobacco: Never Used   Substance and Sexual Activity   • Alcohol use: Yes     Comment: occassional   • Drug use: Yes     Comment: marijuana   • Sexual activity: Not on file   Lifestyle   • Physical activity     Days per week: Not on file     Minutes per session: Not on file   • Stress: Not on file   Relationships   • Social connections     Talks on phone: Not on file     Gets together: Not on file     Attends Jehovah's witness service: Not on file     Active member of club or organization: Not on file     Attends meetings of clubs or organizations: Not on file     Relationship status: Not on file   • Intimate partner violence     Fear of current or ex partner: Not on file     Emotionally abused: Not on file     Physically abused: Not on file     Forced sexual activity: Not on file   Other Topics Concern   • Not on file   Social History Narrative   • Not on file      Social History     Tobacco Use   Smoking Status Current Every Day Smoker   • Packs/day: 0.25   • Years: 50.00   • Pack years: 12.50   • Types: Cigarettes   Smokeless Tobacco Never Used     Social History     Substance and Sexual Activity   Alcohol Use Yes    Comment: occassional     Social History     Substance and Sexual Activity   Drug Use Yes    Comment: marijuana        No family history on file.  No pertinent family history  "on file    Review of Systems   Gastrointestinal: Positive for abdominal pain.   All other systems reviewed and are negative.       Objective:   /72 (BP Location: Left arm, Patient Position: Sitting, BP Cuff Size: Adult)   Pulse 60   Temp 36.2 °C (97.2 °F) (Temporal)   Ht 1.88 m (6' 2\")   Wt 66.2 kg (146 lb)   SpO2 99%   BMI 18.75 kg/m²     Physical Exam   Constitutional: He is oriented to person, place, and time. He appears well-developed.   He does appear to have lost significant weight   HENT:   Head: Normocephalic and atraumatic.   Eyes: Pupils are equal, round, and reactive to light. Conjunctivae are normal.   Neck: Normal range of motion. Neck supple.   Cardiovascular: Normal rate and regular rhythm.   Pulmonary/Chest: Effort normal and breath sounds normal.   Abdominal: Soft. Bowel sounds are normal.   Musculoskeletal: Normal range of motion.   Neurological: He is alert and oriented to person, place, and time.   Skin: Skin is warm and dry.   Psychiatric: He has a normal mood and affect. His behavior is normal. Judgment and thought content normal.   Nursing note and vitals reviewed.      Labs:  Results for CHRISSIE MARY (MRN [id number]) as of 11/9/2020 10:32   Ref. Range 10/28/2020 09:22   WBC Latest Ref Range: 4.8 - 10.8 K/uL 3.2 (L)   RBC Latest Ref Range: 4.70 - 6.10 M/uL 2.93 (L)   Hemoglobin Latest Ref Range: 14.0 - 18.0 g/dL 9.8 (L)   Hematocrit Latest Ref Range: 42.0 - 52.0 % 30.0 (L)   MCV Latest Ref Range: 81.4 - 97.8 fL 102.4 (H)   MCH Latest Ref Range: 27.0 - 33.0 pg 33.4 (H)   MCHC Latest Ref Range: 33.7 - 35.3 g/dL 32.7 (L)   RDW Latest Ref Range: 35.9 - 50.0 fL 65.0 (H)   Platelet Count Latest Ref Range: 164 - 446 K/uL 121 (L)   MPV Latest Ref Range: 9.0 - 12.9 fL 10.5   Neutrophils-Polys Latest Ref Range: 44.00 - 72.00 % 54.50   Neutrophils (Absolute) Latest Ref Range: 1.82 - 7.42 K/uL 1.72 (L)   Lymphocytes Latest Ref Range: 22.00 - 41.00 % 27.80   Lymphs (Absolute) Latest " Ref Range: 1.00 - 4.80 K/uL 0.88 (L)   Monocytes Latest Ref Range: 0.00 - 13.40 % 13.00   Monos (Absolute) Latest Ref Range: 0.00 - 0.85 K/uL 0.41   Eosinophils Latest Ref Range: 0.00 - 6.90 % 3.20   Eos (Absolute) Latest Ref Range: 0.00 - 0.51 K/uL 0.10   Basophils Latest Ref Range: 0.00 - 1.80 % 0.60   Baso (Absolute) Latest Ref Range: 0.00 - 0.12 K/uL 0.02   Immature Granulocytes Latest Ref Range: 0.00 - 0.90 % 0.90   Immature Granulocytes (abs) Latest Ref Range: 0.00 - 0.11 K/uL 0.03   Nucleated RBC Latest Units: /100 WBC 0.00   NRBC (Absolute) Latest Units: K/uL 0.00   Sodium Latest Ref Range: 135 - 145 mmol/L 139   Potassium Latest Ref Range: 3.6 - 5.5 mmol/L 3.7   Chloride Latest Ref Range: 96 - 112 mmol/L 106   Co2 Latest Ref Range: 20 - 33 mmol/L 25   Anion Gap Latest Ref Range: 7.0 - 16.0  8.0   Glucose Latest Ref Range: 65 - 99 mg/dL 83   Bun Latest Ref Range: 8 - 22 mg/dL 8   Creatinine Latest Ref Range: 0.50 - 1.40 mg/dL 0.57   GFR If  Latest Ref Range: >60 mL/min/1.73 m 2 >60   GFR If Non  Latest Ref Range: >60 mL/min/1.73 m 2 >60   Calcium Latest Ref Range: 8.5 - 10.5 mg/dL 8.7   AST(SGOT) Latest Ref Range: 12 - 45 U/L 38   ALT(SGPT) Latest Ref Range: 2 - 50 U/L 43   Alkaline Phosphatase Latest Ref Range: 30 - 99 U/L 220 (H)   Total Bilirubin Latest Ref Range: 0.1 - 1.5 mg/dL 0.3   Albumin Latest Ref Range: 3.2 - 4.9 g/dL 3.5   Total Protein Latest Ref Range: 6.0 - 8.2 g/dL 5.7 (L)   Globulin Latest Ref Range: 1.9 - 3.5 g/dL 2.2   A-G Ratio Latest Units: g/dL 1.6       Imaging: 10/26/20 CT [facility]  Per my read,         Pathology:  6/22/20     FINAL DIAGNOSIS:     A. Slides- head of pancreas mass:          Positive for carcinoma.   B. Core- head of pancreas mass:          Positive for adenocarcinoma morphologically consistent with           pancreatic ductal adenocarcinoma.   C. Brushings/ brush head- common bile duct stricture:          Atypical cells concerning for  high-grade dysplasia or           adenocarcinoma.      Procedures:  6/22/20      Endoscopic ultrasound with fine needle aspiration, Endoscopic retrograde cholangiography with sphincterotomy, bile duct brushing, and stent placement       Diagnosis:     1. 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 Abdominal pain, unspecified abdominal location     3. Abnormal CT of the abdomen             Medical Decision Making:  Today's Assessment / Status / Plan:     In light of the present findings, the patient is starting cycle 7 tomorrow.  There is tumor shrinkage but it still appears to involve the lateral aspect of the SMV portal vein junction for good 2 to 3 cm.  My recommendation is proceed with chemotherapy complete 12 cycles and give him a break for 2 to 3 months and reimage him for possible resection.  He understood this and is agreed to above plan.  He will see Dr. Iqbal tomorrow.    I, Dr. Flores have entered, reviewed and confirmed the above diagnoses related to this patient on this date of service, 11/17/2020  7:52 AM.    He agreed and verbalized his agreement and understanding with the current plan. I answered all questions and concerns he has at this time and advised him to call at any time in the interim with questions or concerns in regards to his care.    Thank you for allowing me to participate in his care, I will continue to follow closely.       Please note that this dictation was created using voice recognition software. I have made every reasonable attempt to correct obvious errors, but I expect that there are errors of grammar and possibly content that I did not discover before finalizing the note.     Thank you for this consultation and allowing me to participate in your patient's care. If I can be of further service please contact my office.

## 2020-11-17 NOTE — PATIENT INSTRUCTIONS
The patient will follow-up with Dr. Iqbal to complete the next 6 cycles of chemotherapy and follow-up with me pain please repeat imaging after it has been completed.

## 2020-11-18 ENCOUNTER — OUTPATIENT INFUSION SERVICES (OUTPATIENT)
Dept: ONCOLOGY | Facility: MEDICAL CENTER | Age: 65
End: 2020-11-18
Attending: INTERNAL MEDICINE
Payer: MEDICARE

## 2020-11-18 VITALS
RESPIRATION RATE: 18 BRPM | SYSTOLIC BLOOD PRESSURE: 155 MMHG | WEIGHT: 150.79 LBS | OXYGEN SATURATION: 100 % | TEMPERATURE: 97 F | DIASTOLIC BLOOD PRESSURE: 68 MMHG | HEART RATE: 66 BPM | BODY MASS INDEX: 21.59 KG/M2 | HEIGHT: 70 IN

## 2020-11-18 DIAGNOSIS — C25.0 MALIGNANT NEOPLASM OF HEAD OF PANCREAS (HCC): ICD-10-CM

## 2020-11-18 PROCEDURE — G0498 CHEMO EXTEND IV INFUS W/PUMP: HCPCS

## 2020-11-18 PROCEDURE — 96367 TX/PROPH/DG ADDL SEQ IV INF: CPT

## 2020-11-18 PROCEDURE — 96417 CHEMO IV INFUS EACH ADDL SEQ: CPT

## 2020-11-18 PROCEDURE — 700105 HCHG RX REV CODE 258: Performed by: INTERNAL MEDICINE

## 2020-11-18 PROCEDURE — 96413 CHEMO IV INFUSION 1 HR: CPT

## 2020-11-18 PROCEDURE — 700111 HCHG RX REV CODE 636 W/ 250 OVERRIDE (IP): Performed by: INTERNAL MEDICINE

## 2020-11-18 PROCEDURE — 96375 TX/PRO/DX INJ NEW DRUG ADDON: CPT

## 2020-11-18 PROCEDURE — 96415 CHEMO IV INFUSION ADDL HR: CPT

## 2020-11-18 RX ORDER — 0.9 % SODIUM CHLORIDE 0.9 %
20 VIAL (ML) INJECTION PRN
Status: CANCELLED | OUTPATIENT
Start: 2020-11-20

## 2020-11-18 RX ORDER — ONDANSETRON 2 MG/ML
4 INJECTION INTRAMUSCULAR; INTRAVENOUS PRN
Status: CANCELLED | OUTPATIENT
Start: 2020-11-18

## 2020-11-18 RX ORDER — HEPARIN SODIUM (PORCINE) LOCK FLUSH IV SOLN 100 UNIT/ML 100 UNIT/ML
500 SOLUTION INTRAVENOUS PRN
Status: CANCELLED | OUTPATIENT
Start: 2020-11-18

## 2020-11-18 RX ORDER — EPINEPHRINE 1 MG/ML(1)
0.5 AMPUL (ML) INJECTION PRN
Status: CANCELLED | OUTPATIENT
Start: 2020-11-18

## 2020-11-18 RX ORDER — DIPHENHYDRAMINE HYDROCHLORIDE 50 MG/ML
50 INJECTION INTRAMUSCULAR; INTRAVENOUS PRN
Status: CANCELLED | OUTPATIENT
Start: 2020-11-18

## 2020-11-18 RX ORDER — HEPARIN SODIUM (PORCINE) LOCK FLUSH IV SOLN 100 UNIT/ML 100 UNIT/ML
500 SOLUTION INTRAVENOUS PRN
Status: CANCELLED | OUTPATIENT
Start: 2020-11-20

## 2020-11-18 RX ORDER — 0.9 % SODIUM CHLORIDE 0.9 %
VIAL (ML) INJECTION PRN
Status: CANCELLED | OUTPATIENT
Start: 2020-11-18

## 2020-11-18 RX ORDER — METHYLPREDNISOLONE SODIUM SUCCINATE 125 MG/2ML
125 INJECTION, POWDER, LYOPHILIZED, FOR SOLUTION INTRAMUSCULAR; INTRAVENOUS PRN
Status: CANCELLED | OUTPATIENT
Start: 2020-11-18

## 2020-11-18 RX ORDER — 0.9 % SODIUM CHLORIDE 0.9 %
10 VIAL (ML) INJECTION PRN
Status: CANCELLED | OUTPATIENT
Start: 2020-11-18

## 2020-11-18 RX ORDER — LOPERAMIDE HYDROCHLORIDE 2 MG/1
2 CAPSULE ORAL
Status: CANCELLED | OUTPATIENT
Start: 2020-11-18

## 2020-11-18 RX ORDER — PROCHLORPERAZINE MALEATE 10 MG
10 TABLET ORAL EVERY 6 HOURS PRN
Status: CANCELLED | OUTPATIENT
Start: 2020-11-18

## 2020-11-18 RX ORDER — DEXTROSE MONOHYDRATE 50 MG/ML
INJECTION, SOLUTION INTRAVENOUS CONTINUOUS
Status: CANCELLED | OUTPATIENT
Start: 2020-11-18

## 2020-11-18 RX ORDER — LOPERAMIDE HYDROCHLORIDE 2 MG/1
4 CAPSULE ORAL PRN
Status: CANCELLED | OUTPATIENT
Start: 2020-11-18

## 2020-11-18 RX ORDER — ONDANSETRON 8 MG/1
8 TABLET, ORALLY DISINTEGRATING ORAL PRN
Status: CANCELLED | OUTPATIENT
Start: 2020-11-18

## 2020-11-18 RX ORDER — 0.9 % SODIUM CHLORIDE 0.9 %
3 VIAL (ML) INJECTION PRN
Status: CANCELLED | OUTPATIENT
Start: 2020-11-18

## 2020-11-18 RX ADMIN — IRINOTECAN HYDROCHLORIDE 220.8 MG: 20 INJECTION, SOLUTION INTRAVENOUS at 14:36

## 2020-11-18 RX ADMIN — DEXAMETHASONE SODIUM PHOSPHATE 12 MG: 4 INJECTION, SOLUTION INTRA-ARTICULAR; INTRALESIONAL; INTRAMUSCULAR; INTRAVENOUS; SOFT TISSUE at 10:58

## 2020-11-18 RX ADMIN — ONDANSETRON 16 MG: 2 INJECTION INTRAMUSCULAR; INTRAVENOUS at 10:37

## 2020-11-18 RX ADMIN — ATROPINE SULFATE 0.5 MG: 1 INJECTION, SOLUTION INTRAMUSCULAR; INTRAVENOUS; SUBCUTANEOUS at 14:32

## 2020-11-18 RX ADMIN — OXALIPLATIN 125.12 MG: 5 INJECTION, SOLUTION, CONCENTRATE INTRAVENOUS at 12:04

## 2020-11-18 RX ADMIN — SODIUM CHLORIDE 150 MG: 9 INJECTION, SOLUTION INTRAVENOUS at 11:21

## 2020-11-18 RX ADMIN — FLUOROURACIL 4415 MG: 50 INJECTION, SOLUTION INTRAVENOUS at 16:19

## 2020-11-18 ASSESSMENT — FIBROSIS 4 INDEX: FIB4 SCORE: 2.55

## 2020-11-18 NOTE — PROGRESS NOTES
Chemotherapy Verification - SECONDARY RN     D1C7    Height = 179 cm  Weight = 68.4 kg  BSA = 1.84 m2       Medication: Oxaliplatin  Dose: 68 mg/m2  Calculated Dose: 125.12 mg                                Medication: Irinotecan  Dose: 120 mg/m2  Calculated Dose: 220.8 mg                               Medication: Fluorouracil (5FU) CADD pump  Dose: 2400 mg/m2  Calculated Dose: 4416 mg over 46 hours                               I confirm that this process was performed independently.

## 2020-11-18 NOTE — PROGRESS NOTES
"Pharmacy Chemotherapy Verification  Dx: pancreatic cancer    Cycle 7 - delayed 1 week  Previous treatment = 10/28/20    Protocol: FOLFIRINOX     OXALIplatin  IV over 2 hrs on Day 1   -20% dose reduction to 68 mg/m2 starting Cycle 6 for tolerance per MD  Irinotecan  IV over 90 min on Day 1    - 8/12/20: dose reduced to  (25% reduction) for the dose on 8/12/20 only   - 8/26/20 C2 Irino dose    -20% dose reduction to 120 mg/m2 starting Cycle 6 for tolerance per MD     -Removed from treatment starting Cycle 6   deleted for tolerance  Fluorouracil 1200 mg/m2 CIVI over 24 hrs on Days 1 and 2 (2400 mg/m2 IV over 46 hours)  14-day cycle x 4-12 cycles or until DP/UT (metastatic)  NCCN Guidelines for Pancreatic Adenocarcinoma V.2.2016  Ronald NGUYEN, et al - N Engl J Med. 2011 May 12;364(03):1810-25. doi: 10.1056/RMPXjw9564105.    Allergies: No Known Allergies  /68   Pulse 66   Temp 36.1 °C (97 °F) (Temporal)   Resp 18   Ht 1.79 m (5' 10.47\")   Wt 68.4 kg (150 lb 12.7 oz)   SpO2 100%   BMI 21.35 kg/m²     Body surface area is 1.84 meters squared.    Labs from 11/17/20 reviewed - all within treatment parameters.      OXALIplatin (Eloxatin) 68 mg/m2 x 1.84 m2 = 125.12 mg    <10% difference, ok to treat with final dose = 125.12 mg IV    Irinotecan (Camptosar) 120 mg/m2 x 1.84 m2 = 220.8 mg    <10% difference, ok to treat with final dose = 220.8 mg IV    Fluorouracil (Adrucil) 2400 mg/m2 x 1.84 m2 = 4416 mg    <10% difference, ok to treat with final dose = 4415 mg CIVI over 46 hrs      Yomaira Jimenez, PharmD, BCOP    "

## 2020-11-18 NOTE — PROGRESS NOTES
[facility]anes into [facility] for Day [facility]/ Cycle 7 of oxaliplatin, irinotecan, and fluorouracil CADD pump. Pt denied having any new or acute complaints today, reports tolerating past treatments well. Port accessed prior, had + blood return, flushed briskly. Pt given pre-meds, irinotecan, and oxaliplatin as prescribed, tolerated well, denied having any complaints during or after infusion. Port had brisk blood return observed prior to attaching home CADD pump. Line of pump secured per pt preference and all clamps unclamped, pump infusing without difficulty or alarms at time of discharge. Janes has next appointment scheduled in case he is unable to transfer care to [place] as planned, discharged home to self care.

## 2020-11-18 NOTE — PROGRESS NOTES
Chemotherapy Verification - PRIMARY RN      Height = 179 cm  Weight = 68.4 kg  BSA = 1.84 m^2       Medication: oxaliplatin  Dose: 68 mg/m^2  Calculated Dose: 125.12 mg                             (In mg/m2, AUC, mg/kg)     Medication: irinotecan  Dose: 120 mg/m^2  Calculated Dose: 220.8 mg                             (In mg/m2, AUC, mg/kg)    Medication: fluorouracil  Dose: 2400 mg/m^2  Over 46 hours Calculated Dose: 4416 mg                             (In mg/m2, AUC, mg/kg)        I confirm this process was performed independently with the BSA and all final chemotherapy dosing calculations congruent.  Any discrepancies of 10% or greater have been addressed with the chemotherapy pharmacist. The resolution of the discrepancy has been documented in the EPIC progress notes.

## 2020-11-18 NOTE — PROGRESS NOTES
"Pharmacy Chemotherapy Calculation    Patient Name: Janes Cody   Dx: Pancreatic Cancer    Protocol: mFOLFIRINOX     *Dosing Reference*  Oxaliplatin 85 mg/m2 IV over 2 hours on Day 1 followed by  10/28/20- dose reduced  By 20% to 68mg/m2 for tolerance  Irinotecan 150 mg/m2 IV over 90 minutes on Day 1 given concurrently with   10/28/20- dose reduced  By 20% to 120mg/m2 for tolerance    10/28/20- discontinued from therapy for tolerance  followed by   Fluorouracil 2400 mg/m2 CIVI over 46 hours    14 day cycle for 12 cycles  Ronald NGUYEN et al. N Engl J Med. [id number]    Allergies:  Patient has no known allergies.     /68   Pulse 66   Temp 36.1 °C (97 °F) (Temporal)   Resp 18   Ht 1.79 m (5' 10.47\")   Wt 68.4 kg (150 lb 12.7 oz)   SpO2 100%   BMI 21.35 kg/m²  Body surface area is 1.84 meters squared.    All lab results 11/17/20 within treatment parameters.      Drug Order   (Drug name, dose, route, IV Fluid & volume, frequency, number of doses) Cycle 7- delayed 1 week  Previous treatment: C6 on 10/28/20     Medication = Oxaliplatin (Eloxatin)  Base Dose= 68mg/m2  Calc Dose: Base Dose x 1.84m2 = 125mg  Final Dose = 125.12mg  Route = IV  Fluid & Volume = D5W 250 mL  Admin Duration = Over 2 hours          <10% difference, okay to treat with final dose   Medication = Irinotecan (Camptosar)  Base Dose= 120mg/m2  Calc Dose: Base Dose x 1.84m2 = 221mg  Final Dose = 220.8 mg  Route = IV  Fluid & Volume = D5W 500 mL  Admin Duration = Over 90 minutes   Run concurrently with Leucovorin       <10% difference, okay to treat with final dose   Medication = Fluorouracil   Base Dose= 2400 mg/m2   Calc Dose: Base Dose x 1.84m2 = 4416mg  Final Dose = 4415mg  Route = CIVI  Fluid & Volume = 88.3mL (+ 3 mL of overfill)  Conc. = 50 mg/mL  Admin Duration = Over 46 hours @ 1.9mL/hr   Via CADD pump for home infusion       <10% difference, okay to treat with final dose     By my signature below, I confirm this " process was performed independently with the BSA and all final chemotherapy dosing calculations congruent. I have reviewed the above chemotherapy order and that my calculation of the final dose and BSA (when applicable) corroborate those calculations of the  pharmacist. Discrepancies of 10% or greater in the written dose have been addressed and documented within the EPIC Progress notes.    Edilberto Garcia, AlexD

## 2020-11-19 ENCOUNTER — TELEPHONE (OUTPATIENT)
Dept: ONCOLOGY | Facility: MEDICAL CENTER | Age: 65
End: 2020-11-19

## 2020-11-19 NOTE — TELEPHONE ENCOUNTER
Mr Cody called for COVID19 prescreening and phone number unable to accept calls.  Emergency contact called and VM left.

## 2020-11-20 ENCOUNTER — OUTPATIENT INFUSION SERVICES (OUTPATIENT)
Dept: ONCOLOGY | Facility: MEDICAL CENTER | Age: 65
End: 2020-11-20
Attending: INTERNAL MEDICINE
Payer: MEDICARE

## 2020-11-20 DIAGNOSIS — C25.0 MALIGNANT NEOPLASM OF HEAD OF PANCREAS (HCC): ICD-10-CM

## 2020-11-20 PROCEDURE — 700111 HCHG RX REV CODE 636 W/ 250 OVERRIDE (IP): Performed by: INTERNAL MEDICINE

## 2020-11-20 PROCEDURE — 96523 IRRIG DRUG DELIVERY DEVICE: CPT

## 2020-11-20 RX ORDER — HEPARIN SODIUM (PORCINE) LOCK FLUSH IV SOLN 100 UNIT/ML 100 UNIT/ML
500 SOLUTION INTRAVENOUS PRN
Status: DISCONTINUED | OUTPATIENT
Start: 2020-11-20 | End: 2020-11-20 | Stop reason: HOSPADM

## 2020-11-20 RX ORDER — HEPARIN SODIUM (PORCINE) LOCK FLUSH IV SOLN 100 UNIT/ML 100 UNIT/ML
SOLUTION INTRAVENOUS
Status: DISCONTINUED
Start: 2020-11-20 | End: 2020-11-20 | Stop reason: HOSPADM

## 2020-11-20 RX ADMIN — HEPARIN 500 UNITS: 100 SYRINGE at 17:38

## 2020-11-21 ENCOUNTER — APPOINTMENT (OUTPATIENT)
Dept: ONCOLOGY | Facility: MEDICAL CENTER | Age: 65
End: 2020-11-21
Attending: INTERNAL MEDICINE
Payer: MEDICARE

## 2020-11-21 VITALS
BODY MASS INDEX: 21.46 KG/M2 | DIASTOLIC BLOOD PRESSURE: 81 MMHG | HEIGHT: 70 IN | RESPIRATION RATE: 18 BRPM | OXYGEN SATURATION: 98 % | WEIGHT: 149.91 LBS | TEMPERATURE: 98.5 F | SYSTOLIC BLOOD PRESSURE: 132 MMHG | HEART RATE: 75 BPM

## 2020-11-21 DIAGNOSIS — C25.0 MALIGNANT NEOPLASM OF HEAD OF PANCREAS (HCC): ICD-10-CM

## 2020-11-21 PROCEDURE — 700111 HCHG RX REV CODE 636 W/ 250 OVERRIDE (IP): Mod: TB | Performed by: INTERNAL MEDICINE

## 2020-11-21 PROCEDURE — 96372 THER/PROPH/DIAG INJ SC/IM: CPT

## 2020-11-21 RX ADMIN — PEGFILGRASTIM-CBQV 6 MG: 6 INJECTION, SOLUTION SUBCUTANEOUS at 16:52

## 2020-11-21 ASSESSMENT — FIBROSIS 4 INDEX: FIB4 SCORE: 2.55

## 2020-11-21 NOTE — PROGRESS NOTES
Pt presented to infusion center for 5FU pump de-access. Patient states pump ended at approximately 1400. Pump had 0.0 ml in reservoir and 89.3 ml delivered. Pump disconnected, cleaned and returned to pharmacy with fabiana pack in pt's labeled bag. Port flushed per protocol, brisk blood return observed, heparinized and de-accessed with needle intact, gauze dressing placed. Pt has next appt, discharged home to self care.

## 2020-11-22 NOTE — PROGRESS NOTES
Pt arrived to [facility] for Udenyca injection. Pt states he will be heading to [place] for the winter and is aware he is on our schedule for his next tx. He is trying to get care transferred to [facility]. Udenyca injection given in back of R arm and covered site with band aid. Pt left infusion center in stable condition.

## 2020-12-02 ENCOUNTER — APPOINTMENT (OUTPATIENT)
Dept: ONCOLOGY | Facility: MEDICAL CENTER | Age: 65
End: 2020-12-02
Attending: INTERNAL MEDICINE
Payer: MEDICARE

## 2020-12-04 ENCOUNTER — APPOINTMENT (OUTPATIENT)
Dept: ONCOLOGY | Facility: MEDICAL CENTER | Age: 65
End: 2020-12-04
Attending: INTERNAL MEDICINE
Payer: MEDICARE

## 2020-12-05 ENCOUNTER — APPOINTMENT (OUTPATIENT)
Dept: ONCOLOGY | Facility: MEDICAL CENTER | Age: 65
End: 2020-12-05
Attending: INTERNAL MEDICINE
Payer: MEDICARE

## 2023-03-18 NOTE — ED TRIAGE NOTES
Chief Complaint   Patient presents with   • Jaundice     Here stating he needs a new stent or to have it cleaned out, recent stent placement and DX of pancreatic cancer.     
show
